# Patient Record
Sex: MALE | RURAL
[De-identification: names, ages, dates, MRNs, and addresses within clinical notes are randomized per-mention and may not be internally consistent; named-entity substitution may affect disease eponyms.]

---

## 2023-10-19 ENCOUNTER — TELEPHONE (OUTPATIENT)
Dept: CARDIOLOGY | Facility: CLINIC | Age: 73
End: 2023-10-19

## 2023-10-19 RX ORDER — METOPROLOL SUCCINATE 25 MG/1
25 TABLET, EXTENDED RELEASE ORAL DAILY
Qty: 30 TABLET | Refills: 0 | OUTPATIENT
Start: 2023-10-19

## 2023-10-19 RX ORDER — ATORVASTATIN CALCIUM 20 MG/1
20 TABLET, FILM COATED ORAL DAILY
Qty: 30 TABLET | Refills: 0 | OUTPATIENT
Start: 2023-10-19

## 2023-10-19 NOTE — TELEPHONE ENCOUNTER
Caller: Hossein Gama    Relationship: Self    Best call back number: [phone]    What is the best time to reach you: ANY    Who are you requesting to speak with (clinical staff, provider,  specific staff member): CLINICAL    What was the call regarding:     PT IS NEEDING HIS MEDICATION REFILLED, BUT DOESN'T KNOW THE NAME. PT IS [facility] AND DOESN'T HAVE HIS MEDICATION WITH HIM. BUT HIS PHARMACY IS    [phone]  [facility]. [address] [facility] IN [place], [place]

## 2023-10-19 NOTE — TELEPHONE ENCOUNTER
CALLED PATIENT NO ANSWER, PATIENT WILL NEED APPT. BEFORE REFILL AS ITS BEEN A YEAR SINCE LAST VISIT.     HUB OK TO READ ENCOUNTER.

## 2024-07-29 ENCOUNTER — APPOINTMENT (OUTPATIENT)
Dept: CARDIOLOGY | Facility: HOSPITAL | Age: 74
End: 2024-07-29

## 2024-07-29 ENCOUNTER — HOSPITAL ENCOUNTER (EMERGENCY)
Facility: HOSPITAL | Age: 74
Discharge: HOME | End: 2024-07-29
Attending: EMERGENCY MEDICINE

## 2024-07-29 ENCOUNTER — APPOINTMENT (OUTPATIENT)
Dept: GENERAL RADIOLOGY | Facility: HOSPITAL | Age: 74
End: 2024-07-29

## 2024-07-29 VITALS
DIASTOLIC BLOOD PRESSURE: 77 MMHG | TEMPERATURE: 97.7 F | SYSTOLIC BLOOD PRESSURE: 105 MMHG | RESPIRATION RATE: 16 BRPM | WEIGHT: 202 LBS | OXYGEN SATURATION: 98 % | HEIGHT: 68 IN | BODY MASS INDEX: 30.62 KG/M2 | HEART RATE: 62 BPM

## 2024-07-29 DIAGNOSIS — I25.10 CORONARY ARTERY DISEASE INVOLVING NATIVE CORONARY ARTERY OF NATIVE HEART, UNSPECIFIED WHETHER ANGINA PRESENT: ICD-10-CM

## 2024-07-29 DIAGNOSIS — I25.84 CORONARY ATHEROSCLEROSIS DUE TO SEVERELY CALCIFIED CORONARY LESION: Primary | ICD-10-CM

## 2024-07-29 DIAGNOSIS — I35.0 AORTIC VALVE STENOSIS, ETIOLOGY OF CARDIAC VALVE DISEASE UNSPECIFIED: ICD-10-CM

## 2024-07-29 DIAGNOSIS — I50.9 CHRONIC CONGESTIVE HEART FAILURE, UNSPECIFIED HEART FAILURE TYPE: ICD-10-CM

## 2024-07-29 DIAGNOSIS — I25.10 3-VESSEL CORONARY ARTERY DISEASE: Primary | ICD-10-CM

## 2024-07-29 DIAGNOSIS — R93.1 ABNORMAL FINDINGS ON CARDIAC CATHETERIZATION: ICD-10-CM

## 2024-07-29 LAB
ALBUMIN SERPL-MCNC: 4.3 G/DL (ref 3.5–5.2)
ALBUMIN/GLOB SERPL: 1.5 G/DL
ALP SERPL-CCNC: 193 U/L (ref 39–117)
ALT SERPL W P-5'-P-CCNC: 14 U/L (ref 1–41)
ANION GAP SERPL CALCULATED.3IONS-SCNC: 11 MMOL/L (ref 5–15)
APTT PPP: 31 SECONDS (ref 60–90)
AST SERPL-CCNC: 21 U/L (ref 1–40)
BASOPHILS # BLD AUTO: 0.02 10*3/MM3 (ref 0–0.2)
BASOPHILS NFR BLD AUTO: 0.3 % (ref 0–1.5)
BILIRUB SERPL-MCNC: 1.3 MG/DL (ref 0–1.2)
BUN SERPL-MCNC: 13 MG/DL (ref 8–23)
BUN/CREAT SERPL: 15.3 (ref 7–25)
CALCIUM SPEC-SCNC: 9.3 MG/DL (ref 8.6–10.5)
CHLORIDE SERPL-SCNC: 102 MMOL/L (ref 98–107)
CO2 SERPL-SCNC: 25 MMOL/L (ref 22–29)
CREAT SERPL-MCNC: 0.85 MG/DL (ref 0.76–1.27)
D DIMER PPP FEU-MCNC: <0.27 MCGFEU/ML (ref 0–0.71)
DEPRECATED RDW RBC AUTO: 46.1 FL (ref 37–54)
EGFRCR SERPLBLD CKD-EPI 2021: 92.9 ML/MIN/1.73
EOSINOPHIL # BLD AUTO: 0.14 10*3/MM3 (ref 0–0.4)
EOSINOPHIL NFR BLD AUTO: 1.9 % (ref 0.3–6.2)
ERYTHROCYTE [DISTWIDTH] IN BLOOD BY AUTOMATED COUNT: 14.2 % (ref 12.3–15.4)
GEN 5 2HR TROPONIN T REFLEX: 27 NG/L
GLOBULIN UR ELPH-MCNC: 2.9 GM/DL
GLUCOSE SERPL-MCNC: 143 MG/DL (ref 65–99)
HCT VFR BLD AUTO: 48.9 % (ref 37.5–51)
HGB BLD-MCNC: 16 G/DL (ref 13–17.7)
HOLD SPECIMEN: NORMAL
IMM GRANULOCYTES # BLD AUTO: 0.02 10*3/MM3 (ref 0–0.05)
IMM GRANULOCYTES NFR BLD AUTO: 0.3 % (ref 0–0.5)
INR PPP: 1.26 (ref 0.89–1.12)
LIPASE SERPL-CCNC: 49 U/L (ref 13–60)
LYMPHOCYTES # BLD AUTO: 1.88 10*3/MM3 (ref 0.7–3.1)
LYMPHOCYTES NFR BLD AUTO: 26.2 % (ref 19.6–45.3)
MCH RBC QN AUTO: 29 PG (ref 26.6–33)
MCHC RBC AUTO-ENTMCNC: 32.7 G/DL (ref 31.5–35.7)
MCV RBC AUTO: 88.6 FL (ref 79–97)
MONOCYTES # BLD AUTO: 0.65 10*3/MM3 (ref 0.1–0.9)
MONOCYTES NFR BLD AUTO: 9.1 % (ref 5–12)
NEUTROPHILS NFR BLD AUTO: 4.47 10*3/MM3 (ref 1.7–7)
NEUTROPHILS NFR BLD AUTO: 62.2 % (ref 42.7–76)
NRBC BLD AUTO-RTO: 0 /100 WBC (ref 0–0.2)
NT-PROBNP SERPL-MCNC: 1738 PG/ML (ref 0–900)
PLATELET # BLD AUTO: 162 10*3/MM3 (ref 140–450)
PMV BLD AUTO: 10.3 FL (ref 6–12)
POTASSIUM SERPL-SCNC: 4.3 MMOL/L (ref 3.5–5.2)
PROT SERPL-MCNC: 7.2 G/DL (ref 6–8.5)
PROTHROMBIN TIME: 16 SECONDS (ref 12.2–14.5)
QT INTERVAL: 404 MS
QT INTERVAL: 428 MS
QTC INTERVAL: 434 MS
QTC INTERVAL: 439 MS
RBC # BLD AUTO: 5.52 10*6/MM3 (ref 4.14–5.8)
SODIUM SERPL-SCNC: 138 MMOL/L (ref 136–145)
TROPONIN T DELTA: 1 NG/L
TROPONIN T SERPL HS-MCNC: 26 NG/L
UFH PPP CHRO-ACNC: 0.1 IU/ML (ref 0.3–0.7)
WBC NRBC COR # BLD AUTO: 7.18 10*3/MM3 (ref 3.4–10.8)
WHOLE BLOOD HOLD COAG: NORMAL
WHOLE BLOOD HOLD SPECIMEN: NORMAL

## 2024-07-29 PROCEDURE — 93017 CV STRESS TEST TRACING ONLY: CPT

## 2024-07-29 PROCEDURE — 85025 COMPLETE CBC W/AUTO DIFF WBC: CPT | Performed by: EMERGENCY MEDICINE

## 2024-07-29 PROCEDURE — 25010000002 DOBUTAMINE PER 250 MG

## 2024-07-29 PROCEDURE — 85520 HEPARIN ASSAY: CPT | Performed by: EMERGENCY MEDICINE

## 2024-07-29 PROCEDURE — 96375 TX/PRO/DX INJ NEW DRUG ADDON: CPT

## 2024-07-29 PROCEDURE — 93325 DOPPLER ECHO COLOR FLOW MAPG: CPT | Performed by: INTERNAL MEDICINE

## 2024-07-29 PROCEDURE — 99283 EMERGENCY DEPT VISIT LOW MDM: CPT | Performed by: INTERNAL MEDICINE

## 2024-07-29 PROCEDURE — 83880 ASSAY OF NATRIURETIC PEPTIDE: CPT | Performed by: EMERGENCY MEDICINE

## 2024-07-29 PROCEDURE — 80053 COMPREHEN METABOLIC PANEL: CPT | Performed by: EMERGENCY MEDICINE

## 2024-07-29 PROCEDURE — 93321 DOPPLER ECHO F-UP/LMTD STD: CPT | Performed by: INTERNAL MEDICINE

## 2024-07-29 PROCEDURE — 93018 CV STRESS TEST I&R ONLY: CPT | Performed by: INTERNAL MEDICINE

## 2024-07-29 PROCEDURE — 25010000002 HEPARIN (PORCINE) PER 1000 UNITS: Performed by: EMERGENCY MEDICINE

## 2024-07-29 PROCEDURE — 84484 ASSAY OF TROPONIN QUANT: CPT | Performed by: EMERGENCY MEDICINE

## 2024-07-29 PROCEDURE — 96376 TX/PRO/DX INJ SAME DRUG ADON: CPT

## 2024-07-29 PROCEDURE — 93350 STRESS TTE ONLY: CPT | Performed by: INTERNAL MEDICINE

## 2024-07-29 PROCEDURE — 93321 DOPPLER ECHO F-UP/LMTD STD: CPT

## 2024-07-29 PROCEDURE — 99284 EMERGENCY DEPT VISIT MOD MDM: CPT

## 2024-07-29 PROCEDURE — 93325 DOPPLER ECHO COLOR FLOW MAPG: CPT

## 2024-07-29 PROCEDURE — 85379 FIBRIN DEGRADATION QUANT: CPT | Performed by: EMERGENCY MEDICINE

## 2024-07-29 PROCEDURE — 85610 PROTHROMBIN TIME: CPT | Performed by: EMERGENCY MEDICINE

## 2024-07-29 PROCEDURE — 93005 ELECTROCARDIOGRAM TRACING: CPT | Performed by: EMERGENCY MEDICINE

## 2024-07-29 PROCEDURE — 25010000002 HEPARIN (PORCINE) 25000-0.45 UT/250ML-% SOLUTION: Performed by: EMERGENCY MEDICINE

## 2024-07-29 PROCEDURE — 93005 ELECTROCARDIOGRAM TRACING: CPT

## 2024-07-29 PROCEDURE — 36415 COLL VENOUS BLD VENIPUNCTURE: CPT

## 2024-07-29 PROCEDURE — 96366 THER/PROPH/DIAG IV INF ADDON: CPT

## 2024-07-29 PROCEDURE — 93350 STRESS TTE ONLY: CPT

## 2024-07-29 PROCEDURE — 71045 X-RAY EXAM CHEST 1 VIEW: CPT

## 2024-07-29 PROCEDURE — 85730 THROMBOPLASTIN TIME PARTIAL: CPT | Performed by: EMERGENCY MEDICINE

## 2024-07-29 PROCEDURE — 83690 ASSAY OF LIPASE: CPT | Performed by: EMERGENCY MEDICINE

## 2024-07-29 PROCEDURE — 96365 THER/PROPH/DIAG IV INF INIT: CPT

## 2024-07-29 RX ORDER — ASPIRIN 325 MG
325 TABLET ORAL DAILY
COMMUNITY

## 2024-07-29 RX ORDER — NITROGLYCERIN 0.4 MG/1
0.4 TABLET SUBLINGUAL
Qty: 100 TABLET | Refills: 0 | Status: SHIPPED | OUTPATIENT
Start: 2024-07-29

## 2024-07-29 RX ORDER — CARVEDILOL 6.25 MG/1
6.25 TABLET ORAL 2 TIMES DAILY WITH MEALS
COMMUNITY

## 2024-07-29 RX ORDER — SPIRONOLACTONE 25 MG/1
25 TABLET ORAL DAILY
COMMUNITY

## 2024-07-29 RX ORDER — HEPARIN SODIUM 1000 [USP'U]/ML
4000 INJECTION, SOLUTION INTRAVENOUS; SUBCUTANEOUS ONCE
Status: COMPLETED | OUTPATIENT
Start: 2024-07-29 | End: 2024-07-29

## 2024-07-29 RX ORDER — LOSARTAN POTASSIUM 25 MG/1
25 TABLET ORAL DAILY
COMMUNITY

## 2024-07-29 RX ORDER — HEPARIN SODIUM 1000 [USP'U]/ML
2000 INJECTION, SOLUTION INTRAVENOUS; SUBCUTANEOUS AS NEEDED
Status: DISCONTINUED | OUTPATIENT
Start: 2024-07-29 | End: 2024-07-29

## 2024-07-29 RX ORDER — SODIUM CHLORIDE 0.9 % (FLUSH) 0.9 %
10 SYRINGE (ML) INJECTION AS NEEDED
Status: DISCONTINUED | OUTPATIENT
Start: 2024-07-29 | End: 2024-07-29 | Stop reason: HOSPADM

## 2024-07-29 RX ORDER — HEPARIN SODIUM 10000 [USP'U]/100ML
10.5 INJECTION, SOLUTION INTRAVENOUS
Status: DISCONTINUED | OUTPATIENT
Start: 2024-07-29 | End: 2024-07-29 | Stop reason: HOSPADM

## 2024-07-29 RX ORDER — ATORVASTATIN CALCIUM 20 MG/1
20 TABLET, FILM COATED ORAL DAILY
COMMUNITY

## 2024-07-29 RX ORDER — HEPARIN SODIUM 1000 [USP'U]/ML
4000 INJECTION, SOLUTION INTRAVENOUS; SUBCUTANEOUS AS NEEDED
Status: DISCONTINUED | OUTPATIENT
Start: 2024-07-29 | End: 2024-07-29

## 2024-07-29 RX ORDER — DOBUTAMINE HYDROCHLORIDE 100 MG/100ML
2-20 INJECTION INTRAVENOUS
Status: DISCONTINUED | OUTPATIENT
Start: 2024-07-29 | End: 2024-07-29

## 2024-07-29 RX ORDER — ASPIRIN 81 MG/1
324 TABLET, CHEWABLE ORAL ONCE
Status: COMPLETED | OUTPATIENT
Start: 2024-07-29 | End: 2024-07-29

## 2024-07-29 RX ADMIN — HEPARIN SODIUM 10.5 UNITS/KG/HR: 10000 INJECTION, SOLUTION INTRAVENOUS at 11:36

## 2024-07-29 RX ADMIN — DOBUTAMINE IN DEXTROSE 5 MCG/KG/MIN: 100 INJECTION, SOLUTION INTRAVENOUS at 14:43

## 2024-07-29 RX ADMIN — ASPIRIN 324 MG: 81 TABLET, CHEWABLE ORAL at 08:46

## 2024-07-29 RX ADMIN — HEPARIN SODIUM 4000 UNITS: 1000 INJECTION INTRAVENOUS; SUBCUTANEOUS at 11:36

## 2024-07-29 NOTE — ED PROVIDER NOTES
Subjective   History of Present Illness  This is a very pleasant 73-year-old male from [facility].  He is accompanied by his wife and sons.  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    He has a history of remote coronary artery disease in 1998 sounds like he had heart cath and stent placement [place] was on aspirin and Coumadin for a while and came off Coumadin.    He really did well up until this past month or so when he is developed exertional chest pain and dyspnea.  He was seen at [facility] apparently had left heart catheterization and an echo and had blockage and sounds like probably by his report an EF of 30%.    He was referred to Dr. Negron at [facility] but apparently there was a problem with his records that called 16 or 18 times to help confirm the follow-up and never got a reply and so his sons had some connection he knew Dr. Tejeda and he called and has an appointment to be seen on the sixth of next month for his issues.    In the interim he has developed worsening exertional dyspnea and some chest pain that is fleeting lasting 15 or 20 seconds does not seem to be associated with exertion but yesterday he was in [employer] and felt woozy and like he might pass out.  He comes the emergency room today hoping to seek an answer for this.  He has a daughter getting  in a few weeks and he is anxious to know if he needs surgery or intervention before that.    He has been compliant with his medications including aspirin.  He had no fevers or chills.  He has had significant peripheral edema that is actually better now after he was started on Lasix temporarily earlier this month.  He felt like he is bloated in his lungs and his abdomen as well but that is improved.    Urination and bowel movements been normal has not passed blood per any orifice.  He has had no fevers chills runny nose sore throat or cough.  Not a smoker or drinker that have any bad habits.  Does like he may have been also  diagnosed with aortic and mitral valve disease again do not have a lot of data on this.        All other systems are reviewed and are negative except as noted above.        Review of Systems   All other systems reviewed and are negative.      No past medical history on file.    No Known Allergies    No past surgical history on file.    No family history on file.    Social History     Socioeconomic History    Marital status:            Objective   Physical Exam  Vitals and nursing note reviewed.   Constitutional:       Comments: This is a very pleasant 73-year-old male alert and oriented GCS 15 he is in no distress.   HENT:      Head: Normocephalic and atraumatic.      Right Ear: External ear normal.      Left Ear: External ear normal.      Nose: Nose normal.      Mouth/Throat:      Mouth: Mucous membranes are moist.      Pharynx: Oropharynx is clear.   Eyes:      Extraocular Movements: Extraocular movements intact.      Conjunctiva/sclera: Conjunctivae normal.      Pupils: Pupils are equal, round, and reactive to light.   Neck:      Vascular: No carotid bruit.   Cardiovascular:      Rate and Rhythm: Normal rate and regular rhythm.      Comments: 3/6 murmur in the aortic area.  He tells me has been told he had a murmur in the past.  Pulmonary:      Effort: Pulmonary effort is normal.      Breath sounds: Normal breath sounds.   Abdominal:      Comments: BMI 30 positive bowel sounds soft and nontender no organomegaly, masses, or guarding.   Musculoskeletal:      Cervical back: Normal range of motion and neck supple. No rigidity or tenderness.      Comments: Full equal pulses in all 4 extremities.  Upper extremities no edema.  Lower extremities he has 2+ edema left leg 1+ right some venous stasis changes no venous cords well-perfused.   Lymphadenopathy:      Cervical: No cervical adenopathy.   Skin:     General: Skin is warm and dry.      Capillary Refill: Capillary refill takes less than 2 seconds.    Neurological:      Mental Status: He is alert.      Comments: Face symmetric, voice strong, tongue midline.  Vision, hearing, and speech preserved.  No focal weakness.         Procedures           ED Course                                 Recent Results (from the past 24 hour(s))   ECG 12 Lead ED Triage Standing Order; Chest Pain    Collection Time: 07/29/24  8:19 AM   Result Value Ref Range    QT Interval 404 ms    QTC Interval 439 ms   High Sensitivity Troponin T    Collection Time: 07/29/24  8:30 AM    Specimen: Blood   Result Value Ref Range    HS Troponin T 26 (H) <22 ng/L   Comprehensive Metabolic Panel    Collection Time: 07/29/24  8:30 AM    Specimen: Blood   Result Value Ref Range    Glucose 143 (H) 65 - 99 mg/dL    BUN 13 8 - 23 mg/dL    Creatinine 0.85 0.76 - 1.27 mg/dL    Sodium 138 136 - 145 mmol/L    Potassium 4.3 3.5 - 5.2 mmol/L    Chloride 102 98 - 107 mmol/L    CO2 25.0 22.0 - 29.0 mmol/L    Calcium 9.3 8.6 - 10.5 mg/dL    Total Protein 7.2 6.0 - 8.5 g/dL    Albumin 4.3 3.5 - 5.2 g/dL    ALT (SGPT) 14 1 - 41 U/L    AST (SGOT) 21 1 - 40 U/L    Alkaline Phosphatase 193 (H) 39 - 117 U/L    Total Bilirubin 1.3 (H) 0.0 - 1.2 mg/dL    Globulin 2.9 gm/dL    A/G Ratio 1.5 g/dL    BUN/Creatinine Ratio 15.3 7.0 - 25.0    Anion Gap 11.0 5.0 - 15.0 mmol/L    eGFR 92.9 >60.0 mL/min/1.73   Lipase    Collection Time: 07/29/24  8:30 AM    Specimen: Blood   Result Value Ref Range    Lipase 49 13 - 60 U/L   BNP    Collection Time: 07/29/24  8:30 AM    Specimen: Blood   Result Value Ref Range    proBNP 1,738.0 (H) 0.0 - 900.0 pg/mL   Green Top (Gel)    Collection Time: 07/29/24  8:30 AM   Result Value Ref Range    Extra Tube Hold for add-ons.    Lavender Top    Collection Time: 07/29/24  8:30 AM   Result Value Ref Range    Extra Tube hold for add-on    Gold Top - SST    Collection Time: 07/29/24  8:30 AM   Result Value Ref Range    Extra Tube Hold for add-ons.    Gray Top    Collection Time: 07/29/24  8:30 AM    Result Value Ref Range    Extra Tube Hold for add-ons.    Light Blue Top    Collection Time: 07/29/24  8:30 AM   Result Value Ref Range    Extra Tube Hold for add-ons.    CBC Auto Differential    Collection Time: 07/29/24  8:30 AM    Specimen: Blood   Result Value Ref Range    WBC 7.18 3.40 - 10.80 10*3/mm3    RBC 5.52 4.14 - 5.80 10*6/mm3    Hemoglobin 16.0 13.0 - 17.7 g/dL    Hematocrit 48.9 37.5 - 51.0 %    MCV 88.6 79.0 - 97.0 fL    MCH 29.0 26.6 - 33.0 pg    MCHC 32.7 31.5 - 35.7 g/dL    RDW 14.2 12.3 - 15.4 %    RDW-SD 46.1 37.0 - 54.0 fl    MPV 10.3 6.0 - 12.0 fL    Platelets 162 140 - 450 10*3/mm3    Neutrophil % 62.2 42.7 - 76.0 %    Lymphocyte % 26.2 19.6 - 45.3 %    Monocyte % 9.1 5.0 - 12.0 %    Eosinophil % 1.9 0.3 - 6.2 %    Basophil % 0.3 0.0 - 1.5 %    Immature Grans % 0.3 0.0 - 0.5 %    Neutrophils, Absolute 4.47 1.70 - 7.00 10*3/mm3    Lymphocytes, Absolute 1.88 0.70 - 3.10 10*3/mm3    Monocytes, Absolute 0.65 0.10 - 0.90 10*3/mm3    Eosinophils, Absolute 0.14 0.00 - 0.40 10*3/mm3    Basophils, Absolute 0.02 0.00 - 0.20 10*3/mm3    Immature Grans, Absolute 0.02 0.00 - 0.05 10*3/mm3    nRBC 0.0 0.0 - 0.2 /100 WBC   D-dimer, Quantitative    Collection Time: 07/29/24  8:30 AM    Specimen: Blood   Result Value Ref Range    D-Dimer, Quantitative <0.27 0.00 - 0.71 MCGFEU/mL   Protime-INR    Collection Time: 07/29/24  8:30 AM    Specimen: Blood   Result Value Ref Range    Protime 16.0 (H) 12.2 - 14.5 Seconds    INR 1.26 (H) 0.89 - 1.12   aPTT    Collection Time: 07/29/24  8:30 AM    Specimen: Blood   Result Value Ref Range    PTT 31.0 (L) 60.0 - 90.0 seconds   ECG 12 Lead ED Triage Standing Order; Chest Pain    Collection Time: 07/29/24 10:45 AM   Result Value Ref Range    QT Interval 428 ms    QTC Interval 434 ms   High Sensitivity Troponin T 2Hr    Collection Time: 07/29/24 10:48 AM    Specimen: Blood   Result Value Ref Range    HS Troponin T 27 (H) <22 ng/L    Troponin T Delta 1 >=-4 - <+4 ng/L    Heparin Anti-Xa    Collection Time: 24 10:48 AM    Specimen: Blood   Result Value Ref Range    Heparin Anti-Xa (UFH) 0.10 (L) 0.30 - 0.70 IU/ml   Adult Stress Echo to Assess Aortic Valve Gradients w/Cont or Stress Agent if Necessary Per Protocol    Collection Time: 24  3:32 PM   Result Value Ref Range    BH CV STRESS PROTOCOL 1 Pharmacologic     Stage 1 1.0     Duration Min Stage 1 2     Duration Sec Stage 1 23     Stress Dose Dobutamine Stage 1 5.00     Stage 2 2.0     Duration Min Stage 2 5     Duration Sec Stage 2 0     Stress Dose Dobutamine Stage 2 10.00     Stage 3 3.0     Duration Sec Stage 3 0     Stress Dose Dobutamine Stage 3 20.00     Baseline HR 71 bpm    Baseline /80 mmHg    O2 sat rest 97 %    Target HR (85%) 127 bpm    Max. Pred. HR (100%) 149 bpm    HR Stage 1 69     BP Stage 1 113/70     O2 Stage 1 98     Stress Rate Stage 1 28     HR Stage 2 68     BP Stage 2 130/74     O2 Stage 2 97     Stress Rate Stage 2 55     HR Stage 3 72     O2 Stage 3 97     Stress Rate Stage 3 110     BP Stage 3 138/72     Duration Min Stage 3 5     Peak HR 78 bpm    Peak /72 mmHg    O2 sat peak 98 %    Recovery HR 74 bpm    Recovery /82 mmHg    Recovery O2 98 %    Percent Max Pred HR 52.35 %    Exercise duration (min) 12 min    Exercise duration (sec) 23 sec    Percent Target HR 62 %    LVIDd 5.0 cm    LVIDs 4.0 cm    IVSd 0.90 cm    LVPWd 0.90 cm    IVS/LVPW 1.00 cm    LVOT diam 2.00 cm    TR max donnie 354.4 cm/sec    LA dimension (2D)  3.6 cm    LV V1 max 56.2 cm/sec    LV V1 max PG 1.28 mmHg    LV V1 mean PG 0.61 mmHg    LV V1 VTI 11.4 cm    Ao pk donnie 201.4 cm/sec    Ao max PG 16.3 mmHg    Ao mean PG 9.5 mmHg    Ao V2 VTI 40.4 cm    MV max PG 4.1 mmHg    MV mean PG 1.70 mmHg    MV V2 VTI 28.8 cm    TR max PG 50.2 mmHg    Ao root diam 3.4 cm    BH CV  AS SEV RESTING HR 67.00     BH CV  AS SEV RESTING SV 37.40     BH CV  AS SEV RESTING LVOT DIAM 2.00     BH CV  AS SEV RESTING  LVOT MUKUND 58.50     BH CV  AS SEV RESTING LVOT VTI 12.30     BH CV  AS SEV RESTING AO MUKUND 193.80     BH CV  AS SEV RESTING AO VTI 40.10     BH CV  AS SEV RESTING AV MEAN 9.70     BH CV  AS SEV RESTING AV PEAK 15.00     BH CV  AS SEV RESTING REYNOLD 0.83     BH CV  AS SEV RESTING AV DI 0.26     BH CV  AS SEV FIRST STAGE MICS 5.00     BH CV  AS SEV FIRST STAGE HR 66.00     BH CV  AS SEV FIRST STAGE SV 33.90     BH CV  AS SEV FIRST STAGE LVOT MUKUND 49.40     BH CV  AS SEV FIRST STAGE LVOT VTI 10.80     BH CV  AS SEV FIRST STAGE AO MUKUND 193.70     BH CV  AS SEV FIRST STAGE AO VTI 41.20     BH CV  AS SEV FIRST STAGE AV MEAN 8.60     BH CV  AS SEV FIRST STAGE AV PEAK 15.00     BH CV  AS SEV FIRST STAGE REYNOLD 0.93     BH CV  AS SEV FIRST STAGE REYNOLD DI 0.30     BH CV  AS SEV SEC STAGE MICS 10.00     BH CV  AS SEV SEC STAGE HR 65.00     BH CV  AS SEV SEC STAGE SV 33.60     BH CV  AS SEV SEC STAGE LVOT MUKUND 58.50     BH CV  AS SEV SEC STAGE LVOT VTI 10.70     BH CV  AS SEV SEC STAGE AO MUKUND 197.70     BH CV  AS SEV SEC STAGE AO VTI 39.80     BH CV  AS SEV SEC STAGE AV MEAN 10.20     BH CV  AS SEV SEC STAGE AV PEAK 15.60     BH CV  AS SEV SEC STAGE REYNOLD 0.96     BH CV  AS SEV SEC STAGE AV DI 0.31     BH CV  AS SEV THRD STAGE MICS 20.00     BH CV  AS SEV THRD STAGE HR 75.00     BH CV  AS SEV THRD STAGE SV 38.90     BH CV  AS SEV THRD STAGE LVOT MUKUND 68.10     BH CV  AS SEV THRD STAGE LVOT VTI 12.40     BH CV  AS SEV THRD STAGE AO MUKUND 245.90     BH CV  AS SEV THRD STAGE AO VTI 47.30     BH CV  AS SEV THRD STAGE AV MEAN 14.30     BH CV  AS SEV THRD STAGE AV PEAK 24.20     BH CV  AS SEV THRD STAGE REYNOLD 0.78     BH CV  AS SEV THRD STAGE AV DI 0.25      Note: In addition to lab results from this visit, the labs listed above may include labs taken at another facility or during a different encounter within the last 24 hours. Please  correlate lab times with ED admission and discharge times for further clarification of the services performed during this visit.    XR Chest 1 View   Final Result   Impression:   No acute cardiopulmonary abnormality.         Electronically Signed: Swati Moreno MD     7/29/2024 9:08 AM EDT     Workstation ID: [id number]        Vitals:    07/29/24 1230 07/29/24 1300 07/29/24 1330 07/29/24 1600   BP: 129/96 112/73 116/75 105/77   Pulse: 75 66 63 62   Resp:       Temp:       SpO2: 98% 96% 97% 98%   Weight:       Height:         Medications   sodium chloride 0.9 % flush 10 mL (has no administration in time range)   heparin 22049 units/250 mL (100 units/mL) in 0.45 % NaCl infusion (0 Units/kg/hr × 91.6 kg Intravenous Stopped 7/29/24 1643)   Pharmacy to Dose Heparin (has no administration in time range)   aspirin chewable tablet 324 mg (324 mg Oral Given 7/29/24 0846)   heparin (porcine) injection 4,000 Units (4,000 Units Intravenous Given 7/29/24 1136)     ECG/EMG Results (last 24 hours)       Procedure Component Value Units Date/Time    ECG 12 Lead ED Triage Standing Order; Chest Pain [[id number]] Collected: 07/29/24 0819     Updated: 07/29/24 0819     QT Interval 404 ms      QTC Interval 439 ms     Narrative:      Test Reason : ED Triage Standing Order~  Blood Pressure :   */*   mmHG  Vent. Rate :  71 BPM     Atrial Rate :  71 BPM     P-R Int : 186 ms          QRS Dur :  94 ms      QT Int : 404 ms       P-R-T Axes :  29  49  70 degrees     QTc Int : 439 ms    Normal sinus rhythm  Possible Inferior infarct , age undetermined  Anterior infarct , age undetermined  Abnormal ECG  No previous ECGs available    Referred By: EDMD           Confirmed By:           ECG 12 Lead ED Triage Standing Order; Chest Pain   Final Result   Test Reason : ED Triage Standing Order~   Blood Pressure :   */*   mmHG   Vent. Rate :  62 BPM     Atrial Rate :  62 BPM      P-R Int : 196 ms          QRS Dur :  96 ms       QT Int : 428 ms       P-R-T  Axes :  25  39  67 degrees      QTc Int : 434 ms      Normal sinus rhythm   Possible Inferior infarct , age undetermined   Anterior infarct , age undetermined   Abnormal ECG   No previous ECGs available   prwp persists as do nsstw changes laterally   nsc c/w earlier ekg   Confirmed by NAZ FAJARDO MD ([id number]) on 7/29/2024 11:03:07 AM      Referred By: ed           Confirmed By: NAZ FAJARDO MD      ECG 12 Lead ED Triage Standing Order; Chest Pain   Preliminary Result   Test Reason : ED Triage Standing Order~   Blood Pressure :   */*   mmHG   Vent. Rate :  71 BPM     Atrial Rate :  71 BPM      P-R Int : 186 ms          QRS Dur :  94 ms       QT Int : 404 ms       P-R-T Axes :  29  49  70 degrees      QTc Int : 439 ms      Normal sinus rhythm   Possible Inferior infarct , age undetermined   Anterior infarct , age undetermined   Abnormal ECG   No previous ECGs available      Referred By: EDMD           Confirmed By:                     Medical Decision Making        I have reviewed all available studies at the bedside with the patient and his family.  D-dimer and cardiac markers are reassuring but does have some component of congestive heart failure though not particularly symptomatic here.    The patient was seen in consultation by cardiology.  Please see their note.  I spoke with Beatris Butler.  Cardiology has reviewed the patient's echo and would like me to discharge her to home they are to contact the patient for follow-up in the office Thursday to arrange coronary artery bypass grafting.  Heart score currently is 6.    We able to get his records from [place] which I also reviewed.    The patient initially presented I started him on IV heparin drip until we could obtain more information as he had already taken his aspirin and his daily medications.    On recheck patient resting comfortably just hungry I provided him a box meal.  Discussed the fact he needs to take it easy not exert himself.  I written him a  prescription for some nitroglycerin to have on a as needed basis but only to take 1 or 2 given his mild aortic stenosis.  Will return to the emergency department for recheck if worse in any way.    All are agreeable with the plan    Problems Addressed:  3-vessel coronary artery disease: complicated acute illness or injury with systemic symptoms that poses a threat to life or bodily functions  Aortic valve stenosis, etiology of cardiac valve disease unspecified: undiagnosed new problem with uncertain prognosis  Chronic congestive heart failure, unspecified heart failure type: chronic illness or injury with exacerbation, progression, or side effects of treatment that poses a threat to life or bodily functions    Amount and/or Complexity of Data Reviewed  Independent Historian: spouse  External Data Reviewed: notes.  Labs: ordered. Decision-making details documented in ED Course.  Radiology: ordered and independent interpretation performed. Decision-making details documented in ED Course.  ECG/medicine tests: ordered and independent interpretation performed. Decision-making details documented in ED Course.    Risk  OTC drugs.  Prescription drug management.  Drug therapy requiring intensive monitoring for toxicity.        Final diagnoses:   3-vessel coronary artery disease   Chronic congestive heart failure, unspecified heart failure type   Aortic valve stenosis, etiology of cardiac valve disease unspecified       ED Disposition  ED Disposition       ED Disposition   Discharge    Condition   Stable    Comment   --               Charlie Turner MD  [address] [place], [place] [id number] [phone]      As needed    Amee Jacobs MD  [facility] [address] [address] [phone]      Their office should call you tomorrow for a follow-up on Thursday.         Medication List        New Prescriptions      nitroglycerin 0.4 MG SL tablet  Commonly known as: NITROSTAT  Place 1 tablet under  the tongue Every 5 (Five) Minutes As Needed for Chest Pain. Take no more than 2 doses in 15 minutes.               Where to Get Your Medications        These medications were sent to [facility] - [place], [place] - [address] [facility] [phone]  - [phone]   [address] [facility] [address]      Phone: [phone]   nitroglycerin 0.4 MG SL tablet            Radames Bauman MD  07/29/24 1651       Radames Bauman MD  07/29/24 6516

## 2024-07-29 NOTE — DISCHARGE INSTRUCTIONS
Take it easy.  Go room to room in the house but do not exert yourself.  Use the nitroglycerin just if you get chest pain that persist for more than a few minutes try 1 and sit down to see if that improves things he might need to take another 1 but I would wait 5 or 10 minutes.  Feel worse, back to the emergency department let us recheck you.

## 2024-07-29 NOTE — CONSULTS
Date of Hospital Visit: 24  Encounter Provider: Beatris Butler PA-C  Place of Service: [facility]  Patient Name: Hossein Gama  :1950  Referral Provider: No ref. provider found  Primary Care Provider: Charlie Turner MD    Chief complaint/Reason for Consultation: chest pain    Problem List:  Coronary artery disease  Remote stent placement approximately , data deficit  Echo 2020, [place]: EF 55-60%, no hemodynamically significant VHD  Stress echo 2020: apical and septal and mid anteroseptal hypokinesis  C at [place] 2024: LAD diffusely diseased with 80% stenosis, mid LAD with 70% tubular stenosis, OM2 totally occluded with bridging collaterals filling mid and distal vessel, small to moderate OM 3 subtotally occluded/99% stenosis, total occlusion of the RCA.  Recommend CT surgery consult for CABG  ED visit with CP and dyspnea on exertion 2024  Ischemic cardiomyopathy  EF 30-35% on cardiac catheterization 2024  Aortic stenosis  [facility] 2024: mild AS with gradient of 15mmHg across aortic valve, suspect some component of low flow low gradient  Hypertension  Dyslipidemia  Diabetes      History of Present Illness:  Patient is a 73-year-old male with the above past medical history who we are asked to see in consultation today for chest pain, dyspnea on exertion, and known coronary artery disease.  Patient underwent left heart catheterization in [place] approximately 2 weeks ago and this revealed severe multivessel CAD as well as ischemic cardiomyopathy and mild aortic stenosis.  He was referred for CABG at an outside hospital but states that he did not hear back from them and was unable to get an appointment.  He did have an upcoming appointment with our office on  but due to an episode of chest pain and dizziness that occurred at [facility] yesterday, he decided to report to the emergency department.  At the time of our visit, he is chest pain-free.  States  "that his edema has improved with diuresis.    No past medical history on file.    No past surgical history on file.    (Not in a hospital admission)      Social History     Socioeconomic History    Marital status:        No family history on file.    REVIEW OF SYSTEMS:     12 point ROS was performed and is Negative except as outlined in HPI     Objective:     Vitals:    07/29/24 0829 07/29/24 1200 07/29/24 1215 07/29/24 1230   BP: 124/75 112/78  129/96   Pulse: 67 65 72 75   Resp:       Temp:       SpO2: 96% 96% 97% 98%   Weight:       Height:         Body mass index is 30.71 kg/m².  Flowsheet Rows      Flowsheet Row First Filed Value   Admission Height 172.7 cm (68\") Documented at 07/29/2024 0813   Admission Weight 91.6 kg (202 lb) Documented at 07/29/2024 0813            Physical Exam   General: well developed and well nourished.    Skin: Skin is warm and dry. No obvious cyanosis, erythema or pallor.   HEENT: Atraumatic, normocephalic, no conjunctival pallor, no scleral icterus.   Neck: Supple, no JVD.   Chest:No respiratory distress. No chest wall tenderness. Breath sounds are normal. No wheezes, rhonchi or rales.  Cardiovascular: regular rate and rhythm, 1/6 systolic ejection murmur  Pulses:Radial and pedal pulses are 2+ and symmetric.    Abdomen: Soft, nontender, normal bowel sounds.   Musculoskeletal/Extremities: 1+ BLE edema  Neurological: Alert and oriented to person, place, and time, no gross focal deficits.   Psychiatric: Normal mood and affect.Speech and behavior is normal.    Lab Review:                Results from last 7 days   Lab Units 07/29/24  0830   SODIUM mmol/L 138   POTASSIUM mmol/L 4.3   CHLORIDE mmol/L 102   CO2 mmol/L 25.0   BUN mg/dL 13   CREATININE mg/dL 0.85   GLUCOSE mg/dL 143*   CALCIUM mg/dL 9.3     Results from last 7 days   Lab Units 07/29/24  1048 07/29/24  0830   HSTROP T ng/L 27* 26*     Results from last 7 days   Lab Units 07/29/24  0830   WBC 10*3/mm3 7.18   HEMOGLOBIN " g/dL 16.0   HEMATOCRIT % 48.9   PLATELETS 10*3/mm3 162     Results from last 7 days   Lab Units 07/29/24  0830   INR  1.26*   APTT seconds 31.0*               Imaging Results (Last 24 Hours)       Procedure Component Value Units Date/Time    XR Chest 1 View [[id number]] Collected: 07/29/24 0908     Updated: 07/29/24 0912    Narrative:      XR CHEST 1 VW    Date of Exam: 7/29/2024 8:26 AM EDT    Indication: Chest Pain Triage Protocol    Comparison: None available.    Findings:  Cardiomediastinal silhouette is prominent.  No airspace disease, pneumothorax, nor pleural effusion. No acute osseous abnormality identified.      Impression:      Impression:  No acute cardiopulmonary abnormality.      Electronically Signed: Swati Moreno MD    7/29/2024 9:08 AM EDT    Workstation ID: [id number]             EKG: NSR with nonspecific ST-T changes, no acute changes          Assessment:   Coronary artery disease, MVCAD on C at [facility] 7/16/2024, cath report reviewed today  Ischemic cardiomyopathy, EF 30-35% on [facility]  Aortic stenosis, mild on C, cannot rule out low flow low gradient  Hypertension  Dyslipidemia   Diabetes    Plan:   Cath report reviewed and have requested cath films via power share. Will need CTS consult for consideration of CABG.  Continue aspirin and statin for CAD. No plavix in anticipation of surgery.   Continue losartan, coreg, and aldactone for GDMT for HF.. May benefit from lasix 20mg daily until time of CABG.   Will obtain dobutamine echo to better depict aortic valve gradient.   Further recommendations to follow echo results and cath film review.     Beatris Butler PA-C      STAFF CARDIOLOGIST:      SUMMARY:    73 years old with recent history of ischemic cardiomyopathy here for worsening shortness of breath      PERTINENT:    Severe coronary artery disease  Mild aortic valve      IMPRESSION:    Coronary artery disease      RECOMMENDATIONS:    Patient is not a candidate for percutaneous  revascularization.  Patient dobutamine echo did not reveal any significant gradient or increase in EF  We will continue treating medically.  We will get surgical evaluation as an outpatient.        I have seen and examined the patient, reviewed the above note, necessary changes were made and I agree with the final note.   Amee Jacobs MD

## 2024-07-29 NOTE — CONSULTS
HEPARIN INFUSION  Hossein Gama is a  73 y.o. male receiving heparin infusion.     Therapy for (VTE/Cardiac):   Cardiac  Patient Weight: 91.6kg  Initial Bolus (Y/N):   Y  Any Bolus (Y/N):   Y        Signs or Symptoms of Bleeding: None per RN    Cardiac or Other (Not VTE)   Initial Bolus: 60 units/kg (Max 4,000 units)  Initial rate: 12 units/kg/hr (Max 1,000 units/hr)   Anti Xa Rebolus Infusion Hold time Change infusion Dose (Units/kg/hr) Next Anti Xa or aPTT Level Due   < 0.11 50 Units/kg  (4000 Units Max) None Increase by  3 Units/kg/hr 6 hours   0.11- 0.19 25 Units/kg  (2000 Units Max) None Increase by  2 Units/kg/hr 6 hours   0.2 - 0.29 0 None Increase by  1 Units/kg/hr 6 hours   0.3 - 0.5 0 None No Change 6 hours (after 2 consecutive levels in range check qAM)   0.51 - 0.6 0 None Decrease by  1 Units/kg/hr 6 hours   0.61 - 0.8 0 30 Minutes Decrease by  2 Units/kg/hr 6 hours   0.81 - 1 0 60 Minutes Decrease by  3 Units/kg/hr 6 hours   >1 0 Hold  After Anti Xa less than 0.5 decrease previous rate by  4 Units/kg/hr  Every 2 hours until Anti Xa  less than 0.5 then when infusion restarts in 6 hours     Recommend Xa every 6 hours.     Results from last 7 days   Lab Units 07/29/24  0830   INR  1.26*   HEMOGLOBIN g/dL 16.0   HEMATOCRIT % 48.9   PLATELETS 10*3/mm3 162          Date   Time   Anti-Xa Current Rate (Unit/kg/hr) Bolus   (Units) Rate Change   (Unit/kg/hr) New Rate (Unit/kg/hr) Next   Anti-Xa Comments  Pump Check Daily   7/29   NEW START 4000 +10.5 10.5 1800 DW RN   7/29 1139 0.1 --- 4000 +10.5 10.5 1800 Dw ED RN. Pump confirmed. Patient counseled                 Ronnie Siu IV, PharmD, BCPS  7/29/2024  11:52 EDT

## 2024-07-30 NOTE — PROGRESS NOTES
Follow-up Visit      Date: 2024  Patient Name: Hossein Gama  : 1950   MRN: [id number]     Chief Complaint:    Chief Complaint   Patient presents with    Coronary Artery Disease       History of Present Illness: Hossein Gama is a 73 y.o. male who is here today for follow-up from the ER for evaluation for coronary artery disease and cardiomyopathy.  Patient has been started on the medication and has been feeling much better.  Patient was diuresed in the ER and has been feeling much better.    Patient episode started few years ago and starting having chest pain and shortness of breath.  Patient also starting having more symptoms and now has been feeling much better.  He was able to do all his activities today.     Patient was evaluated for aortic stenosis by dobutamine echo which was mild.      Problem List     CARDIAC  Coronary Artery Disease:  Remote stents in    Stress echo 2020: apical and septal and mid anteroseptal hypokinesis   [facility] 2024: Severe diffusely disease, not percutaneous candidate    Myocardium:   Echo 2020, [place]: EF 55-60%  EF 2024:30%-35%     Valvular:   Mild AS with gradient of 15mmHg across aortic valve    Electrical:   NSR     Pericardium:   Normal     CARDIAC RISK FACTORS  Hypertension  Diabetes  Dyslipidemia    NON-CARDIAC  None    SURGERIES  None      Subjective      Review of Systems:   Review of Systems   Respiratory: Negative.     Cardiovascular: Negative.        Medications:     Current Outpatient Medications:     aspirin 325 MG tablet, Take 1 tablet by mouth Daily., Disp: , Rfl:     atorvastatin (LIPITOR) 20 MG tablet, Take 1 tablet by mouth Daily., Disp: , Rfl:     carvedilol (COREG) 6.25 MG tablet, Take 1 tablet by mouth 2 (Two) Times a Day With Meals., Disp: , Rfl:     losartan (COZAAR) 25 MG tablet, Take 1 tablet by mouth Daily., Disp: , Rfl:     metFORMIN (GLUCOPHAGE) 500 MG tablet, Take 1 tablet by mouth 2 (Two) Times a Day  "With Meals., Disp: , Rfl:     nitroglycerin (NITROSTAT) 0.4 MG SL tablet, Place 1 tablet under the tongue Every 5 (Five) Minutes As Needed for Chest Pain. Take no more than 2 doses in 15 minutes., Disp: 100 tablet, Rfl: 0    spironolactone (ALDACTONE) 25 MG tablet, Take 1 tablet by mouth Daily., Disp: , Rfl:     furosemide (LASIX) 20 MG tablet, Take 1 tablet by mouth Daily., Disp: 30 tablet, Rfl: 11    potassium chloride 10 MEQ CR tablet, Take 1 tablet by mouth 2 (Two) Times a Day., Disp: 180 tablet, Rfl: 3    Allergies:   No Known Allergies    Objective     Physical Exam:  Vitals:    08/01/24 1023   BP: 118/72   BP Location: Right arm   Patient Position: Sitting   Pulse: 66   SpO2: 98%   Weight: 93.4 kg (206 lb)   Height: 172.7 cm (67.99\")     Body mass index is 31.33 kg/m².    Constitutional:       General: Not in acute distress.     Appearance: Healthy appearance. Not in distress.     Neck:     JVP:Not elevated     Carotid artery: Normal    Pulmonary:      Effort: Pulmonary effort is normal.      Breath sounds: Normal breath sounds. No wheezing. No rhonchi. No rales.     Cardiovascular:      Normal rate. Regular rhythm. Normal S1. Normal S2.      Murmurs: There is mild systolic murmur.      No gallop. No click. No rub.     Abdominal:      General: Bowel sounds are normal.      Palpations: Abdomen is soft.      Tenderness: There is no abdominal tenderness.    Extremities:     Pulses:Normal radial and pedal pulses     Edema:no edema    Smoking Cessation:   Tobacco Product History : Patient never smoked    Lab Review:   Lab Results   Component Value Date    GLUCOSE 143 (H) 07/29/2024    BUN 13 07/29/2024    CREATININE 0.85 07/29/2024    BCR 15.3 07/29/2024    K 4.3 07/29/2024    CO2 25.0 07/29/2024    CALCIUM 9.3 07/29/2024    ALBUMIN 4.3 07/29/2024    AST 21 07/29/2024    ALT 14 07/29/2024     Lab Results   Component Value Date    WBC 7.18 07/29/2024    HGB 16.0 07/29/2024    HCT 48.9 07/29/2024    MCV 88.6 " 07/29/2024     07/29/2024         Assessment / Plan      Assessment:   Diagnosis Plan   1. Coronary atherosclerosis due to severely calcified coronary lesion        2. Nonrheumatic aortic valve stenosis        3. Systolic congestive heart failure, unspecified HF chronicity             Plan:  Patient has severe coronary artery disease.  Patient is not a candidate for percutaneous intervention.  Patient has consult with cardiothoracic surgeon on August 6.  Patient aortic valve has mild stenosis.  The mean gradient is between 10-15.  Patient has been put on proper medication for heart failure.  I have given her prescription for Lasix and potassium and is, call us back if his symptoms deteriorates.      Follow Up:       Return in about 3 months (around 11/1/2024).    Amee Jacobs MD

## 2024-08-01 ENCOUNTER — OFFICE VISIT (OUTPATIENT)
Dept: CARDIOLOGY | Facility: CLINIC | Age: 74
End: 2024-08-01

## 2024-08-01 ENCOUNTER — TELEPHONE (OUTPATIENT)
Dept: CARDIAC SURGERY | Facility: CLINIC | Age: 74
End: 2024-08-01

## 2024-08-01 VITALS
WEIGHT: 206 LBS | BODY MASS INDEX: 31.22 KG/M2 | SYSTOLIC BLOOD PRESSURE: 118 MMHG | HEART RATE: 66 BPM | HEIGHT: 68 IN | DIASTOLIC BLOOD PRESSURE: 72 MMHG | OXYGEN SATURATION: 98 %

## 2024-08-01 DIAGNOSIS — I50.20 SYSTOLIC CONGESTIVE HEART FAILURE, UNSPECIFIED HF CHRONICITY: ICD-10-CM

## 2024-08-01 DIAGNOSIS — I35.0 NONRHEUMATIC AORTIC VALVE STENOSIS: ICD-10-CM

## 2024-08-01 DIAGNOSIS — I25.84 CORONARY ATHEROSCLEROSIS DUE TO SEVERELY CALCIFIED CORONARY LESION: Primary | ICD-10-CM

## 2024-08-01 LAB
BH CV DOB AS SEV FIRST STAGE AO VEL: 193.7
BH CV DOB AS SEV FIRST STAGE AO VTI: 41.2
BH CV DOB AS SEV FIRST STAGE AV MEAN: 8.6
BH CV DOB AS SEV FIRST STAGE AV PEAK: 15
BH CV DOB AS SEV FIRST STAGE AVA DI: 0.3
BH CV DOB AS SEV FIRST STAGE AVA: 0.93
BH CV DOB AS SEV FIRST STAGE HR: 66
BH CV DOB AS SEV FIRST STAGE LVOT VEL: 49.4
BH CV DOB AS SEV FIRST STAGE LVOT VTI: 10.8
BH CV DOB AS SEV FIRST STAGE MICS: 5
BH CV DOB AS SEV FIRST STAGE SV: 33.9
BH CV DOB AS SEV RESTING AO VEL: 193.8
BH CV DOB AS SEV RESTING AO VTI: 40.1
BH CV DOB AS SEV RESTING AV DI: 0.26
BH CV DOB AS SEV RESTING AV MEAN: 9.7
BH CV DOB AS SEV RESTING AV PEAK: 15
BH CV DOB AS SEV RESTING AVA: 0.83
BH CV DOB AS SEV RESTING HR: 67
BH CV DOB AS SEV RESTING LVOT DIAM: 2
BH CV DOB AS SEV RESTING LVOT VEL: 58.5
BH CV DOB AS SEV RESTING LVOT VTI: 12.3
BH CV DOB AS SEV RESTING SV: 37.4
BH CV DOB AS SEV SEC STAGE AO VEL: 197.7
BH CV DOB AS SEV SEC STAGE AO VTI: 39.8
BH CV DOB AS SEV SEC STAGE AV DI: 0.31
BH CV DOB AS SEV SEC STAGE AV MEAN: 10.2
BH CV DOB AS SEV SEC STAGE AV PEAK: 15.6
BH CV DOB AS SEV SEC STAGE AVA: 0.96
BH CV DOB AS SEV SEC STAGE HR: 65
BH CV DOB AS SEV SEC STAGE LVOT VEL: 58.5
BH CV DOB AS SEV SEC STAGE LVOT VTI: 10.7
BH CV DOB AS SEV SEC STAGE MICS: 10
BH CV DOB AS SEV SEC STAGE SV: 33.6
BH CV DOB AS SEV THRD STAGE AO VEL: 245.9
BH CV DOB AS SEV THRD STAGE AO VTI: 47.3
BH CV DOB AS SEV THRD STAGE AV DI: 0.25
BH CV DOB AS SEV THRD STAGE AV MEAN: 14.3
BH CV DOB AS SEV THRD STAGE AV PEAK: 24.2
BH CV DOB AS SEV THRD STAGE AVA: 0.78
BH CV DOB AS SEV THRD STAGE HR: 75
BH CV DOB AS SEV THRD STAGE LVOT VEL: 68.1
BH CV DOB AS SEV THRD STAGE LVOT VTI: 12.4
BH CV DOB AS SEV THRD STAGE MICS: 20
BH CV DOB AS SEV THRD STAGE SV: 38.9
BH CV ECHO MEAS - AO MAX PG: 16.3 MMHG
BH CV ECHO MEAS - AO MEAN PG: 9.5 MMHG
BH CV ECHO MEAS - AO ROOT DIAM: 3.4 CM
BH CV ECHO MEAS - AO V2 MAX: 201.4 CM/SEC
BH CV ECHO MEAS - AO V2 VTI: 40.4 CM
BH CV ECHO MEAS - IVS/LVPW: 1 CM
BH CV ECHO MEAS - IVSD: 0.9 CM
BH CV ECHO MEAS - LA DIMENSION: 3.6 CM
BH CV ECHO MEAS - LV MAX PG: 1.28 MMHG
BH CV ECHO MEAS - LV MEAN PG: 0.61 MMHG
BH CV ECHO MEAS - LV V1 MAX: 56.2 CM/SEC
BH CV ECHO MEAS - LV V1 VTI: 11.4 CM
BH CV ECHO MEAS - LVIDD: 5 CM
BH CV ECHO MEAS - LVIDS: 4 CM
BH CV ECHO MEAS - LVOT DIAM: 2 CM
BH CV ECHO MEAS - LVPWD: 0.9 CM
BH CV ECHO MEAS - MV MAX PG: 4.1 MMHG
BH CV ECHO MEAS - MV MEAN PG: 1.7 MMHG
BH CV ECHO MEAS - MV V2 VTI: 28.8 CM
BH CV ECHO MEAS - TR MAX PG: 50.2 MMHG
BH CV ECHO MEAS - TR MAX VEL: 354.4 CM/SEC
BH CV STRESS BP STAGE 1: NORMAL
BH CV STRESS BP STAGE 2: NORMAL
BH CV STRESS BP STAGE 3: NORMAL
BH CV STRESS DOSE DOBUTAMINE STAGE 1: 5
BH CV STRESS DOSE DOBUTAMINE STAGE 2: 10
BH CV STRESS DOSE DOBUTAMINE STAGE 3: 20
BH CV STRESS DURATION MIN STAGE 1: 2
BH CV STRESS DURATION MIN STAGE 2: 5
BH CV STRESS DURATION MIN STAGE 3: 5
BH CV STRESS DURATION SEC STAGE 1: 23
BH CV STRESS DURATION SEC STAGE 2: 0
BH CV STRESS DURATION SEC STAGE 3: 0
BH CV STRESS ECHO POST STRESS EJECTION FRACTION EF: 40 %
BH CV STRESS HR STAGE 1: 69
BH CV STRESS HR STAGE 2: 68
BH CV STRESS HR STAGE 3: 72
BH CV STRESS O2 STAGE 1: 98
BH CV STRESS O2 STAGE 2: 97
BH CV STRESS O2 STAGE 3: 97
BH CV STRESS PROTOCOL 1: NORMAL
BH CV STRESS RATE STAGE 1: 28
BH CV STRESS RATE STAGE 2: 55
BH CV STRESS RATE STAGE 3: 110
BH CV STRESS RECOVERY BP: NORMAL MMHG
BH CV STRESS RECOVERY HR: 74 BPM
BH CV STRESS RECOVERY O2: 98 %
BH CV STRESS STAGE 1: 1
BH CV STRESS STAGE 2: 2
BH CV STRESS STAGE 3: 3
MAXIMAL PREDICTED HEART RATE: 149 BPM
PERCENT MAX PREDICTED HR: 52.35 %
STRESS BASELINE BP: NORMAL MMHG
STRESS BASELINE HR: 71 BPM
STRESS O2 SAT REST: 97 %
STRESS PERCENT HR: 62 %
STRESS POST EXERCISE DUR MIN: 12 MIN
STRESS POST EXERCISE DUR SEC: 23 SEC
STRESS POST O2 SAT PEAK: 98 %
STRESS POST PEAK BP: NORMAL MMHG
STRESS POST PEAK HR: 78 BPM
STRESS TARGET HR: 127 BPM

## 2024-08-01 RX ORDER — POTASSIUM CHLORIDE 750 MG/1
10 TABLET, FILM COATED, EXTENDED RELEASE ORAL 2 TIMES DAILY
Qty: 180 TABLET | Refills: 3 | Status: SHIPPED | OUTPATIENT
Start: 2024-08-01

## 2024-08-01 RX ORDER — FUROSEMIDE 20 MG/1
20 TABLET ORAL DAILY
Qty: 30 TABLET | Refills: 11 | Status: SHIPPED | OUTPATIENT
Start: 2024-08-01

## 2024-08-01 NOTE — TELEPHONE ENCOUNTER
m for pt to call back. Surgery will not be scheduled on day on new pt office visit. Pt MAY require further testing, etc.. prior to scheduling surgery. Pt will have ample time to set up transportation prior to his surgery.

## 2024-08-01 NOTE — TELEPHONE ENCOUNTER
Caller: Hossein Gama    Relationship: Self    Best call back number: [phone]  OK TO DETAILED VM     What is the best time to reach you: ANY    Who are you requesting to speak with (clinical staff, provider,  specific staff member): ANY    Do you know the name of the person who called: N/A    What was the call regarding: PT CALLED AND ASKED IF HE WOULD BE GIVEN HIS SURGERY DATE AT HIS NEW PT APPT ON 8/6/24. I LET PT KNOW I WASN'T SURE IF THEY COULD GIVE EXACT DATE AT THAT TIME. HE REQUESTED I PUT IN A NOTE REQUESTING THE DATE OF HIS OPEN HEART SURGERY BE READY AT HIS APPT ON 8/6/24 OR SOONER IF POSSIBLE. HE IS REQUESTING THIS SO HE CAN ARRANGE FOR TRANSPORT ASAP - THEY HAVE TO HIRE TRANSPORT.     Is it okay if the provider responds through MyChart: NO PLEASE CALL

## 2024-08-05 PROBLEM — I25.10 CAD (CORONARY ARTERY DISEASE): Status: ACTIVE | Noted: 2024-07-29

## 2024-08-05 NOTE — PROGRESS NOTES
08/06/2024  Patient Information  Hossein Gama                                                                                          [address]  [facility] [address]   1950  'PCP/Referring Physician'  Charlie Turner MD  [phone]  Amee Jacobs MD  [phone]  Chief Complaint   Patient presents with    Coronary Artery Disease     Referred by Dr. Guillen for CAD. Patient had chest pain last night that radiated into left shoulder and down left arm.        History of Present Illness:  Hossein Gama is a 73 y.o. gentleman referred to Dr. Michaud per Dr. Jacobs for CAD.  PMH: Hypertension, type 2 diabetes, dyslipidemia, mild aortic stenosis, and multivessel CAD with ejection fraction 30 to 35%.  Cardiac history includes remote PCI 1998 followed most recently by left heart cath at [facility] July 2024: LAD diffusely diseased with 80% stenosis, mid LAD 70% tubular stenosis, OM2 occluded with bridging collaterals filling the mid and distal vessel, small to moderate OM 3 subtotally occluded with 99% stenosis and total occlusion of the RCA.  Patient had originally been referred for CABG at outside hospital but presented to [facility] ER 7/29/2024 at which time Dr. Jacobs performed cardiology consultation.  Outpatient consultation with Dr. Michaud was arranged.  Since the ER visit, patient has continued to experience intermittent chest pain.  Medical management includes aspirin, statin, losartan, Coreg, and Aldactone.  Dr. Jacobs arrange dobutamine stress echo 7/29/2024 which did confirm mild aortic stenosis with aortic mean gradient 14 mmHg at 20 mics per minute dobutamine.  History of chest wall radiation.  No history of lower extremity PVD procedures or vein stripping.  He did suffer trauma/open fracture left lower extremity years ago.      Patient Active Problem List   Diagnosis    CAD (coronary artery disease)     Past Medical History:   Diagnosis Date    CAD (coronary artery disease) 07/29/2024    Chest  pain     CHF (congestive heart failure)     Deep vein thrombosis     LEFT LEG    Diabetes     GERD (gastroesophageal reflux disease)     Heart failure 2024    Hyperlipidemia     Hypertension     Myocardial infarction     Pneumonia      Past Surgical History:   Procedure Laterality Date    CHOLECYSTECTOMY      CORONARY STENT PLACEMENT         Current Outpatient Medications:     aspirin 325 MG tablet, Take 1 tablet by mouth Daily., Disp: , Rfl:     atorvastatin (LIPITOR) 20 MG tablet, Take 1 tablet by mouth Daily., Disp: , Rfl:     carvedilol (COREG) 6.25 MG tablet, Take 1 tablet by mouth 2 (Two) Times a Day With Meals., Disp: , Rfl:     furosemide (LASIX) 20 MG tablet, Take 1 tablet by mouth Daily., Disp: 30 tablet, Rfl: 11    losartan (COZAAR) 25 MG tablet, Take 1 tablet by mouth Daily., Disp: , Rfl:     metFORMIN (GLUCOPHAGE) 500 MG tablet, Take 1 tablet by mouth 2 (Two) Times a Day With Meals., Disp: , Rfl:     nitroglycerin (NITROSTAT) 0.4 MG SL tablet, Place 1 tablet under the tongue Every 5 (Five) Minutes As Needed for Chest Pain. Take no more than 2 doses in 15 minutes., Disp: 100 tablet, Rfl: 0    potassium chloride 10 MEQ CR tablet, Take 1 tablet by mouth 2 (Two) Times a Day., Disp: 180 tablet, Rfl: 3    spironolactone (ALDACTONE) 25 MG tablet, Take 1 tablet by mouth Daily., Disp: , Rfl:   No Known Allergies  Social History     Socioeconomic History    Marital status:     Number of children: 13   Tobacco Use    Smoking status: Former     Types: Pipe     Quit date:      Years since quittin.6     Passive exposure: Never    Smokeless tobacco: Never   Vaping Use    Vaping status: Never Used   Substance and Sexual Activity    Alcohol use: Never    Drug use: Never    Sexual activity: Defer     Family History   Problem Relation Age of Onset    No Known Problems Mother     Cancer Father      Review of Systems   Constitutional: Positive for malaise/fatigue. Negative for chills, decreased  "appetite, diaphoresis, fever, night sweats, weight gain and weight loss.   HENT:  Negative for hoarse voice.    Eyes:  Negative for blurred vision, double vision and visual disturbance.   Cardiovascular:  Positive for chest pain, dyspnea on exertion and leg swelling. Negative for claudication, irregular heartbeat, near-syncope, orthopnea, palpitations, paroxysmal nocturnal dyspnea and syncope.   Respiratory:  Positive for shortness of breath. Negative for cough, hemoptysis, sputum production and wheezing.    Hematologic/Lymphatic: Negative for adenopathy and bleeding problem. Does not bruise/bleed easily.   Skin:  Negative for color change, nail changes, poor wound healing and rash.   Musculoskeletal:  Negative for back pain, falls and muscle cramps.   Gastrointestinal:  Negative for abdominal pain, dysphagia and heartburn.   Genitourinary:  Negative for flank pain.   Neurological:  Positive for dizziness, light-headedness and numbness (feet). Negative for brief paralysis, disturbances in coordination, focal weakness, headaches, loss of balance, paresthesias, sensory change, vertigo and weakness.   Psychiatric/Behavioral:  Negative for depression and suicidal ideas. The patient is not nervous/anxious.    Allergic/Immunologic: Negative for persistent infections.     Vitals:    08/06/24 1126 08/06/24 1127   BP: 118/58 100/58   BP Location: Right arm Left arm   Patient Position: Sitting Sitting   Pulse: 67    Temp: 98.8 °F (37.1 °C)    SpO2: 98%    Weight: 92.9 kg (204 lb 12.8 oz)    Height: 172.7 cm (68\")       Physical Exam  Vitals reviewed.   Constitutional:       General: He is not in acute distress.     Appearance: He is not toxic-appearing.   HENT:      Head: Normocephalic and atraumatic.   Eyes:      General: Lids are normal.      Conjunctiva/sclera: Conjunctivae normal.      Pupils: Pupils are equal, round, and reactive to light.   Neck:      Vascular: No carotid bruit or JVD.   Cardiovascular:      Rate and " Rhythm: Normal rate and regular rhythm.      Pulses:           Dorsalis pedis pulses are 1+ on the right side and 1+ on the left side.        Posterior tibial pulses are 1+ on the right side and 1+ on the left side.      Heart sounds: S1 normal and S2 normal. Murmur heard.      Systolic murmur is present with a grade of 2/6.   Pulmonary:      Effort: Pulmonary effort is normal. No respiratory distress.      Breath sounds: Normal breath sounds.   Musculoskeletal:         General: Normal range of motion.      Cervical back: Normal range of motion and neck supple.      Right lower leg: No edema.      Left lower leg: No edema.   Lymphadenopathy:      Cervical: No cervical adenopathy.   Skin:     General: Skin is warm and dry.      Capillary Refill: Capillary refill takes less than 2 seconds.   Neurological:      General: No focal deficit present.      Mental Status: He is alert and oriented to person, place, and time.      GCS: GCS eye subscore is 4. GCS verbal subscore is 5. GCS motor subscore is 6.   Psychiatric:         Attention and Perception: Attention normal.         Mood and Affect: Mood normal.         Speech: Speech normal.         Behavior: Behavior is cooperative.         Thought Content: Thought content normal.         Judgment: Judgment normal.     The ROS, past medical history, surgical history, family history, social history, and vitals were reviewed by myself and corrected as needed.      Labs/Imaging:  Dobutamine Stress Echo 8/1/2024 Interpretation Summary     Patient did not endorse chest pain or any other symptoms during stress test.    SR with rare PVCs and PJCs throughout study    T wave inversion in inferolateral leads at baseline and throughout study. No significant ST changes noted.    Unable to assess for myocardial ischemia.    Patient gradient across the mitral valve shows mild aortic stenosis with dobutamine stress echo.    Left ventricular ejection fraction appears to be 36 - 40%.    Left  ventricular diastolic function is consistent with (grade I) impaired relaxation.    Mild aortic valve stenosis is present.    Cardiac Cath 7/16/2024 @ [facility]/Dr. Fields (Personally reviewed)  Coronary Angiography:  Left main coronary artery  Free of disease  Left anterior descending coronary artery:  Proximal segment is calcific.  Proximal LAD diffusely diseased with 80% stenosis.  Small to moderate D1 arising from the proximal LAD is free of disease.  Mid LAD has 70% tubular stenosis.  Rest of LAD and moderate D2 are free of disease.  Circumflex coronary artery:  Nondominant.  Small OM1 arising from the proximal segment is free of disease.  Moderate OM 2 totally occluded with bridging collaterals filling the mid and distal vessel, small to moderate OM 3 subtotally occluded, 99% stenosis.  OM 2 and OM 3 arise from mid circumflex.  Circumflex bifurcates distally into moderate OM 4 and smaller true circumflex.  Collaterals noted to PL and PDA on injection of left coronary system  Right coronary artery: Total occlusion mid segment  Left ventriculography:  Very limited study.  Catheter just past LVOT, inadequate evaluation of LV function, LV catheterization done mostly for gradient across aortic valve  Impression:  Severe three-vessel CAD  Moderate LV dysfunction (echo EF 30 to 35%)  Peak gradient across aortic valve 15 mmHg, low gradient low flow AS not excluded  Biventricular CHF  Recommendations:  Evaluate for CABG.  Will need dobutamine echo to evaluate for low-flow low gradient aortic stenosis, will defer to CT surgery  Tray Fields MD 7/16/2024                          Radial Artery Checklist:        Vicente Test:         Upper Extremity Arterial Duplex:  No          Hand Dominance:  Right       History Of Major Arm Trauma:  No       History Of Upper Extremity Surgery:  No       Raynaud's Syndrome:  No       Scleroderma:  No       Advanced Stage Chronic Kidney Disease:  No        Angiography Via Radial Artery Access:  No       Carpel Tunnel:  No       Lymphedema:  No       Occupation:  farming/construction       ------------------------------------------------------------------------------------------             Assessment/Plan:    1. Coronary artery disease involving native coronary artery of native heart with angina pectoris   - Hossein Gama is a 73 y.o. gentleman referred to Dr. Michaud per Dr. Jacobs for CAD.    - PMH: Hypertension, type 2 diabetes, dyslipidemia, mild aortic stenosis,  multivessel CAD with remote PCI 1998, and ischemic cardiomyopathy w/ ejection fraction 30 - 35%.    - Left heart cath at [place] July 2024: LAD diffusely diseased with 80% stenosis, mid LAD 70% tubular stenosis, OM2 occluded with bridging collaterals filling the mid and distal vessel, small to moderate OM 3 subtotally occluded with 99% stenosis and total occlusion of the RCA.    - Originally been referred for CABG at outside hospital but [facility] 7/29/2024 at which time Dr. Jacobs performed cardiology consultation.   - Since the ER visit, patient has continued to experience intermittent chest pain.    - Medical management includes aspirin, statin, losartan, Coreg, and Aldactone.    - Dobutamine stress echo 7/29/2024: mild aortic stenosis with aortic mean gradient 14 mmHg at 20 mics per minute dobutamine.   - No history of chest wall radiation.  No history of lower extremity PVD procedures or vein stripping.  He did suffer trauma/open fracture left lower extremity years ago.  - Bilateral Vicente testing is positive  - Discussed coronary artery bypass grafting with EV/RAH in detail including death, MI, CVA, renal dysfunction, blood clots, bleeding, respiratory compromise, and infection.  Discussed post op expectations as well.  Mr. Gama verbalized understanding of the procedure and risks and he wishes to proceed.  - Carotid duplex pending  - Follow up in clinic 6 weeks post op  CABG/EVH/YOLA/RAH per Dr. Michaud.         Patient Education: see surgical risk discussion above      Follow Up:   Return in about 8 weeks (around 10/1/2024) for post op CABG/EVH/YOLA/RAH.   Or sooner for any further concerns or worsening sign and symptoms. If unable to reach us in the office please dial 911 or go to the nearest emergency department.      KAILEY Winchester  [facility] Cardiothoracic Surgery    Time Spent: I spent 45 minutes caring for Hossein on this date of service. This time includes time spent by me in the following activities: preparing for the visit, reviewing tests, obtaining and/or reviewing a separately obtained history, performing a medically appropriate examination and/or evaluation, counseling and educating the patient/family/caregiver, ordering medications, tests, or procedures, referring and communicating with other health care professionals, documenting information in the medical record, independently interpreting results and communicating that information with the patient/family/caregiver, and care coordination.

## 2024-08-06 ENCOUNTER — OFFICE VISIT (OUTPATIENT)
Dept: CARDIAC SURGERY | Facility: CLINIC | Age: 74
End: 2024-08-06

## 2024-08-06 VITALS
BODY MASS INDEX: 31.04 KG/M2 | SYSTOLIC BLOOD PRESSURE: 100 MMHG | HEART RATE: 67 BPM | TEMPERATURE: 98.8 F | OXYGEN SATURATION: 98 % | WEIGHT: 204.8 LBS | DIASTOLIC BLOOD PRESSURE: 58 MMHG | HEIGHT: 68 IN

## 2024-08-06 DIAGNOSIS — I25.119 CORONARY ARTERY DISEASE INVOLVING NATIVE CORONARY ARTERY OF NATIVE HEART WITH ANGINA PECTORIS: Primary | ICD-10-CM

## 2024-08-06 PROCEDURE — 99204 OFFICE O/P NEW MOD 45 MIN: CPT | Performed by: NURSE PRACTITIONER

## 2024-08-08 ENCOUNTER — PREP FOR SURGERY (OUTPATIENT)
Dept: OTHER | Facility: HOSPITAL | Age: 74
End: 2024-08-08

## 2024-08-08 DIAGNOSIS — I25.119 CORONARY ARTERY DISEASE INVOLVING NATIVE CORONARY ARTERY OF NATIVE HEART WITH ANGINA PECTORIS: Primary | ICD-10-CM

## 2024-08-09 RX ORDER — ASPIRIN 325 MG
325 TABLET ORAL NIGHTLY
OUTPATIENT
Start: 2024-08-09 | End: 2024-08-10

## 2024-08-09 RX ORDER — CHLORHEXIDINE GLUCONATE ORAL RINSE 1.2 MG/ML
15 SOLUTION DENTAL ONCE
OUTPATIENT
Start: 2024-08-09 | End: 2024-08-09

## 2024-08-09 RX ORDER — NITROGLYCERIN 0.4 MG/1
0.4 TABLET SUBLINGUAL
OUTPATIENT
Start: 2024-08-09

## 2024-08-09 RX ORDER — GABAPENTIN 300 MG/1
300 CAPSULE ORAL ONCE
OUTPATIENT
Start: 2024-08-09 | End: 2024-08-09

## 2024-08-09 RX ORDER — CHLORHEXIDINE GLUCONATE 500 MG/1
CLOTH TOPICAL EVERY 12 HOURS PRN
OUTPATIENT
Start: 2024-08-09

## 2024-08-09 RX ORDER — CHLORHEXIDINE GLUCONATE 500 MG/1
1 CLOTH TOPICAL EVERY 12 HOURS PRN
OUTPATIENT
Start: 2024-08-09

## 2024-08-12 ENCOUNTER — TELEPHONE (OUTPATIENT)
Dept: CARDIAC SURGERY | Facility: CLINIC | Age: 74
End: 2024-08-12

## 2024-08-12 NOTE — TELEPHONE ENCOUNTER
Patient spouse called to ask if it was okay for patient to take ibuprofen for headache. She states they were concerned for him to take it since he takes an ASA 325mg dose daily. Discussed with KAILEY Winchester. She advised that patient can take ibuprofen on PRN basis with food for headache. Relayed info to patient's spouse. Advised that they call with any further questions or concerns. She verbalized understanding and agreed.

## 2024-08-23 ENCOUNTER — PRE-ADMISSION TESTING (OUTPATIENT)
Dept: PREADMISSION TESTING | Facility: HOSPITAL | Age: 74
End: 2024-08-23

## 2024-08-23 ENCOUNTER — HOSPITAL ENCOUNTER (OUTPATIENT)
Dept: CARDIOLOGY | Facility: HOSPITAL | Age: 74
Discharge: HOME | End: 2024-08-23

## 2024-08-23 ENCOUNTER — HOSPITAL ENCOUNTER (OUTPATIENT)
Dept: PULMONOLOGY | Facility: HOSPITAL | Age: 74
Discharge: HOME | End: 2024-08-23

## 2024-08-23 ENCOUNTER — HOSPITAL ENCOUNTER (OUTPATIENT)
Dept: GENERAL RADIOLOGY | Facility: HOSPITAL | Age: 74
Discharge: HOME | End: 2024-08-23

## 2024-08-23 VITALS — BODY MASS INDEX: 31.27 KG/M2 | OXYGEN SATURATION: 98 % | WEIGHT: 206.35 LBS | HEIGHT: 68 IN

## 2024-08-23 VITALS — HEIGHT: 68 IN | WEIGHT: 204.81 LBS | BODY MASS INDEX: 31.04 KG/M2

## 2024-08-23 DIAGNOSIS — I25.119 CORONARY ARTERY DISEASE INVOLVING NATIVE CORONARY ARTERY OF NATIVE HEART WITH ANGINA PECTORIS: ICD-10-CM

## 2024-08-23 DIAGNOSIS — Z01.89 LABORATORY TEST: Primary | ICD-10-CM

## 2024-08-23 LAB
ABO GROUP BLD: NORMAL
ALBUMIN SERPL-MCNC: 4.2 G/DL (ref 3.5–5.2)
ALBUMIN/GLOB SERPL: 1.4 G/DL
ALP SERPL-CCNC: 141 U/L (ref 39–117)
ALT SERPL W P-5'-P-CCNC: 14 U/L (ref 1–41)
AMPHET+METHAMPHET UR QL: NEGATIVE
AMPHETAMINES UR QL: NEGATIVE
ANION GAP SERPL CALCULATED.3IONS-SCNC: 11 MMOL/L (ref 5–15)
APTT PPP: 30.3 SECONDS (ref 22–39)
AST SERPL-CCNC: 20 U/L (ref 1–40)
BARBITURATES UR QL SCN: NEGATIVE
BASOPHILS # BLD AUTO: 0.01 10*3/MM3 (ref 0–0.2)
BASOPHILS NFR BLD AUTO: 0.1 % (ref 0–1.5)
BENZODIAZ UR QL SCN: NEGATIVE
BH CV XLRA MEAS LEFT DIST CCA EDV: 14.2 CM/SEC
BH CV XLRA MEAS LEFT DIST CCA PSV: 62.6 CM/SEC
BH CV XLRA MEAS LEFT DIST ICA EDV: 23.4 CM/SEC
BH CV XLRA MEAS LEFT DIST ICA PSV: 74.1 CM/SEC
BH CV XLRA MEAS LEFT ICA/CCA RATIO: 0.98
BH CV XLRA MEAS LEFT MID CCA EDV: 14.2 CM/SEC
BH CV XLRA MEAS LEFT MID CCA PSV: 75.7 CM/SEC
BH CV XLRA MEAS LEFT MID ICA EDV: 17.1 CM/SEC
BH CV XLRA MEAS LEFT MID ICA PSV: 59.6 CM/SEC
BH CV XLRA MEAS LEFT PROX CCA EDV: 10.5 CM/SEC
BH CV XLRA MEAS LEFT PROX CCA PSV: 70.1 CM/SEC
BH CV XLRA MEAS LEFT PROX ECA EDV: 6.7 CM/SEC
BH CV XLRA MEAS LEFT PROX ECA PSV: 57.8 CM/SEC
BH CV XLRA MEAS LEFT PROX ICA EDV: 16.8 CM/SEC
BH CV XLRA MEAS LEFT PROX ICA PSV: 70.5 CM/SEC
BH CV XLRA MEAS LEFT PROX SCLA PSV: 107.15 CM/SEC
BH CV XLRA MEAS LEFT VERTEBRAL A PSV: 23.1 CM/SEC
BH CV XLRA MEAS RIGHT DIST CCA EDV: 9.8 CM/SEC
BH CV XLRA MEAS RIGHT DIST CCA PSV: 77.9 CM/SEC
BH CV XLRA MEAS RIGHT DIST ICA EDV: 15.2 CM/SEC
BH CV XLRA MEAS RIGHT DIST ICA PSV: 58.2 CM/SEC
BH CV XLRA MEAS RIGHT ICA/CCA RATIO: 0.8
BH CV XLRA MEAS RIGHT MID CCA EDV: 9.7 CM/SEC
BH CV XLRA MEAS RIGHT MID CCA PSV: 76 CM/SEC
BH CV XLRA MEAS RIGHT MID ICA EDV: 9.7 CM/SEC
BH CV XLRA MEAS RIGHT MID ICA PSV: 37.1 CM/SEC
BH CV XLRA MEAS RIGHT PROX CCA EDV: 6.2 CM/SEC
BH CV XLRA MEAS RIGHT PROX CCA PSV: 60.9 CM/SEC
BH CV XLRA MEAS RIGHT PROX ECA EDV: 6.3 CM/SEC
BH CV XLRA MEAS RIGHT PROX ECA PSV: 67.5 CM/SEC
BH CV XLRA MEAS RIGHT PROX ICA EDV: 14.9 CM/SEC
BH CV XLRA MEAS RIGHT PROX ICA PSV: 62 CM/SEC
BH CV XLRA MEAS RIGHT PROX SCLA PSV: 86.91 CM/SEC
BH CV XLRA MEAS RIGHT VERTEBRAL A PSV: 26.2 CM/SEC
BILIRUB SERPL-MCNC: 0.8 MG/DL (ref 0–1.2)
BUN SERPL-MCNC: 13 MG/DL (ref 8–23)
BUN/CREAT SERPL: 14 (ref 7–25)
BUPRENORPHINE SERPL-MCNC: NEGATIVE NG/ML
CALCIUM SPEC-SCNC: 9.4 MG/DL (ref 8.6–10.5)
CANNABINOIDS SERPL QL: NEGATIVE
CHLORIDE SERPL-SCNC: 102 MMOL/L (ref 98–107)
CO2 SERPL-SCNC: 25 MMOL/L (ref 22–29)
COCAINE UR QL: NEGATIVE
CREAT SERPL-MCNC: 0.93 MG/DL (ref 0.76–1.27)
DEPRECATED RDW RBC AUTO: 44.2 FL (ref 37–54)
EGFRCR SERPLBLD CKD-EPI 2021: 87.2 ML/MIN/1.73
EOSINOPHIL # BLD AUTO: 0.28 10*3/MM3 (ref 0–0.4)
EOSINOPHIL NFR BLD AUTO: 4 % (ref 0.3–6.2)
ERYTHROCYTE [DISTWIDTH] IN BLOOD BY AUTOMATED COUNT: 13.9 % (ref 12.3–15.4)
FENTANYL UR-MCNC: NEGATIVE NG/ML
GLOBULIN UR ELPH-MCNC: 2.9 GM/DL
GLUCOSE SERPL-MCNC: 185 MG/DL (ref 65–99)
HBA1C MFR BLD: 7.5 % (ref 4.8–5.6)
HCT VFR BLD AUTO: 47.7 % (ref 37.5–51)
HGB BLD-MCNC: 15.8 G/DL (ref 13–17.7)
IMM GRANULOCYTES # BLD AUTO: 0.08 10*3/MM3 (ref 0–0.05)
IMM GRANULOCYTES NFR BLD AUTO: 1.1 % (ref 0–0.5)
INR PPP: 1.19 (ref 0.89–1.12)
LYMPHOCYTES # BLD AUTO: 1.71 10*3/MM3 (ref 0.7–3.1)
LYMPHOCYTES NFR BLD AUTO: 24.6 % (ref 19.6–45.3)
MAGNESIUM SERPL-MCNC: 1.9 MG/DL (ref 1.6–2.4)
MCH RBC QN AUTO: 29 PG (ref 26.6–33)
MCHC RBC AUTO-ENTMCNC: 33.1 G/DL (ref 31.5–35.7)
MCV RBC AUTO: 87.5 FL (ref 79–97)
METHADONE UR QL SCN: NEGATIVE
MONOCYTES # BLD AUTO: 0.57 10*3/MM3 (ref 0.1–0.9)
MONOCYTES NFR BLD AUTO: 8.2 % (ref 5–12)
NEUTROPHILS NFR BLD AUTO: 4.31 10*3/MM3 (ref 1.7–7)
NEUTROPHILS NFR BLD AUTO: 62 % (ref 42.7–76)
NRBC BLD AUTO-RTO: 0 /100 WBC (ref 0–0.2)
OPIATES UR QL: NEGATIVE
OXYCODONE UR QL SCN: NEGATIVE
PA ADP PRP-ACNC: 174 PRU
PCP UR QL SCN: NEGATIVE
PLATELET # BLD AUTO: 149 10*3/MM3 (ref 140–450)
PMV BLD AUTO: 10.9 FL (ref 6–12)
POTASSIUM SERPL-SCNC: 4.6 MMOL/L (ref 3.5–5.2)
PROT SERPL-MCNC: 7.1 G/DL (ref 6–8.5)
PROTHROMBIN TIME: 15.2 SECONDS (ref 12.2–14.5)
RBC # BLD AUTO: 5.45 10*6/MM3 (ref 4.14–5.8)
RH BLD: POSITIVE
SODIUM SERPL-SCNC: 138 MMOL/L (ref 136–145)
TRICYCLICS UR QL SCN: NEGATIVE
WBC NRBC COR # BLD AUTO: 6.96 10*3/MM3 (ref 3.4–10.8)

## 2024-08-23 PROCEDURE — 83036 HEMOGLOBIN GLYCOSYLATED A1C: CPT

## 2024-08-23 PROCEDURE — 86900 BLOOD TYPING SEROLOGIC ABO: CPT

## 2024-08-23 PROCEDURE — 85576 BLOOD PLATELET AGGREGATION: CPT

## 2024-08-23 PROCEDURE — 71046 X-RAY EXAM CHEST 2 VIEWS: CPT

## 2024-08-23 PROCEDURE — 83735 ASSAY OF MAGNESIUM: CPT

## 2024-08-23 PROCEDURE — 36415 COLL VENOUS BLD VENIPUNCTURE: CPT

## 2024-08-23 PROCEDURE — 80307 DRUG TEST PRSMV CHEM ANLYZR: CPT

## 2024-08-23 PROCEDURE — 94010 BREATHING CAPACITY TEST: CPT

## 2024-08-23 PROCEDURE — 85610 PROTHROMBIN TIME: CPT

## 2024-08-23 PROCEDURE — 85025 COMPLETE CBC W/AUTO DIFF WBC: CPT

## 2024-08-23 PROCEDURE — 93880 EXTRACRANIAL BILAT STUDY: CPT

## 2024-08-23 PROCEDURE — 86901 BLOOD TYPING SEROLOGIC RH(D): CPT

## 2024-08-23 PROCEDURE — 85730 THROMBOPLASTIN TIME PARTIAL: CPT

## 2024-08-23 PROCEDURE — 80053 COMPREHEN METABOLIC PANEL: CPT

## 2024-08-23 NOTE — PAT
Per david at office- pt rescheduled- pt aware- david states okay to still do pat.     98% ra so abg not needed.     EKG on chart 24    Too early to draw type and screen in PAT.  Please obtain blood bank specimen in pre-op on the day of surgery.    Patient directed to Radiology Department for CXR and pft after Pre Admission Testing Appointment.     Patient to apply Chlorhexadine wipes  to surgical area (as instructed) the night before procedure and the AM of procedure. Wipes provided.    Instructed patient to take 325 mg of Asprin (or four tablets of the 81 mg strength) the night before heart surgery as per CT surgeon's order.  Patient and/or family verbalized understanding.    Per Anesthesia Request, patient instructed not to take their ACE/ARB medications on the AM of surgery.    Patient instructed to drink 20 ounces of Gatorade or Gatorlyte (if diabetic) and it needs to be completed 1 hour (for Main OR patients) or 2 hours (scheduled  section & [facility] patients) before given arrival time for procedure (NO RED Gatorade and NO Gatorade Zero).    Patient verbalized understanding.    Bactroban to be applied to each nostril during PAT visit if surgery the following day.  If surgery NOT the following day, then Bactroban supplied to patient with instructions both written and verbally to insert Bactroban into each nares the night before surgery.    Pt worried about his beard being cut- so message left for MAMADOU to be aware and address dos.

## 2024-08-27 ENCOUNTER — PREP FOR SURGERY (OUTPATIENT)
Dept: OTHER | Facility: HOSPITAL | Age: 74
End: 2024-08-27

## 2024-08-27 DIAGNOSIS — I25.119 CORONARY ARTERY DISEASE INVOLVING NATIVE CORONARY ARTERY OF NATIVE HEART WITH ANGINA PECTORIS: Primary | ICD-10-CM

## 2024-09-02 ENCOUNTER — ANESTHESIA EVENT (OUTPATIENT)
Dept: PERIOP | Facility: HOSPITAL | Age: 74
End: 2024-09-02

## 2024-09-02 RX ORDER — FAMOTIDINE 10 MG/ML
20 INJECTION, SOLUTION INTRAVENOUS ONCE
Status: CANCELLED | OUTPATIENT
Start: 2024-09-02 | End: 2024-09-02

## 2024-09-03 ENCOUNTER — APPOINTMENT (OUTPATIENT)
Dept: GENERAL RADIOLOGY | Facility: HOSPITAL | Age: 74
DRG: 220 | End: 2024-09-03

## 2024-09-03 ENCOUNTER — ANESTHESIA (OUTPATIENT)
Dept: PERIOP | Facility: HOSPITAL | Age: 74
End: 2024-09-03

## 2024-09-03 ENCOUNTER — ANCILLARY PROCEDURE (OUTPATIENT)
Dept: PERIOP | Facility: HOSPITAL | Age: 74
DRG: 220 | End: 2024-09-03

## 2024-09-03 ENCOUNTER — ANESTHESIA EVENT CONVERTED (OUTPATIENT)
Dept: ANESTHESIOLOGY | Facility: HOSPITAL | Age: 74
DRG: 220 | End: 2024-09-03

## 2024-09-03 ENCOUNTER — HOSPITAL ENCOUNTER (INPATIENT)
Facility: HOSPITAL | Age: 74
LOS: 6 days | Discharge: HOME | DRG: 220 | End: 2024-09-09
Attending: THORACIC SURGERY (CARDIOTHORACIC VASCULAR SURGERY) | Admitting: THORACIC SURGERY (CARDIOTHORACIC VASCULAR SURGERY)

## 2024-09-03 DIAGNOSIS — I25.119 CORONARY ARTERY DISEASE INVOLVING NATIVE CORONARY ARTERY OF NATIVE HEART WITH ANGINA PECTORIS: ICD-10-CM

## 2024-09-03 PROBLEM — I50.9: Status: ACTIVE | Noted: 2024-09-03

## 2024-09-03 PROBLEM — I35.0 AORTIC STENOSIS: Status: ACTIVE | Noted: 2024-09-03

## 2024-09-03 PROBLEM — E11.9 TYPE 2 DIABETES MELLITUS: Status: ACTIVE | Noted: 2024-09-03

## 2024-09-03 PROBLEM — I10 BENIGN ESSENTIAL HTN: Status: ACTIVE | Noted: 2024-09-03

## 2024-09-03 PROBLEM — E78.5 DYSLIPIDEMIA: Status: ACTIVE | Noted: 2024-09-03

## 2024-09-03 LAB
ABO GROUP BLD: NORMAL
ACT BLD: 122 SECONDS (ref 82–152)
ACT BLD: 140 SECONDS (ref 82–152)
ACT BLD: 428 SECONDS (ref 82–152)
ACT BLD: 464 SECONDS (ref 82–152)
ACT BLD: 476 SECONDS (ref 82–152)
ACT BLD: 580 SECONDS (ref 82–152)
ACT BLD: 746 SECONDS (ref 82–152)
ACT BLD: 904 SECONDS (ref 82–152)
ACT BLD: >1000 SECONDS (ref 82–152)
ALBUMIN SERPL-MCNC: 3.4 G/DL (ref 3.5–5.2)
ALBUMIN SERPL-MCNC: 4.3 G/DL (ref 3.5–5.2)
ANION GAP SERPL CALCULATED.3IONS-SCNC: 10 MMOL/L (ref 5–15)
ANION GAP SERPL CALCULATED.3IONS-SCNC: 13 MMOL/L (ref 5–15)
APTT PPP: 27.8 SECONDS (ref 22–39)
APTT PPP: 36.1 SECONDS (ref 22–39)
ARTERIAL PATENCY WRIST A: ABNORMAL
ATMOSPHERIC PRESS: ABNORMAL MM[HG]
BASE EXCESS BLDA CALC-SCNC: -3.8 MMOL/L (ref 0–2)
BASE EXCESS BLDA CALC-SCNC: -4.6 MMOL/L (ref 0–2)
BASE EXCESS BLDA CALC-SCNC: -6.8 MMOL/L (ref 0–2)
BDY SITE: ABNORMAL
BLD GP AB SCN SERPL QL: NEGATIVE
BODY TEMPERATURE: 37
BUN SERPL-MCNC: 11 MG/DL (ref 8–23)
BUN SERPL-MCNC: 11 MG/DL (ref 8–23)
BUN/CREAT SERPL: 12.5 (ref 7–25)
BUN/CREAT SERPL: 13.6 (ref 7–25)
CA-I SERPL ISE-MCNC: 1.31 MMOL/L (ref 1.12–1.32)
CALCIUM SPEC-SCNC: 8.9 MG/DL (ref 8.6–10.5)
CALCIUM SPEC-SCNC: 8.9 MG/DL (ref 8.6–10.5)
CHLORIDE SERPL-SCNC: 112 MMOL/L (ref 98–107)
CHLORIDE SERPL-SCNC: 112 MMOL/L (ref 98–107)
CO2 BLDA-SCNC: 20.4 MMOL/L (ref 22–33)
CO2 BLDA-SCNC: 21.6 MMOL/L (ref 22–33)
CO2 BLDA-SCNC: 22.3 MMOL/L (ref 22–33)
CO2 SERPL-SCNC: 19 MMOL/L (ref 22–29)
CO2 SERPL-SCNC: 20 MMOL/L (ref 22–29)
COHGB MFR BLD: 1.1 % (ref 0–2)
COHGB MFR BLD: 1.3 % (ref 0–2)
COHGB MFR BLD: 1.3 % (ref 0–2)
CREAT SERPL-MCNC: 0.81 MG/DL (ref 0.76–1.27)
CREAT SERPL-MCNC: 0.88 MG/DL (ref 0.76–1.27)
DEPRECATED RDW RBC AUTO: 44.9 FL (ref 37–54)
DEPRECATED RDW RBC AUTO: 46.1 FL (ref 37–54)
EGFRCR SERPLBLD CKD-EPI 2021: 91.4 ML/MIN/1.73
EGFRCR SERPLBLD CKD-EPI 2021: 93.7 ML/MIN/1.73
EPAP: 0
ERYTHROCYTE [DISTWIDTH] IN BLOOD BY AUTOMATED COUNT: 14 % (ref 12.3–15.4)
ERYTHROCYTE [DISTWIDTH] IN BLOOD BY AUTOMATED COUNT: 14.2 % (ref 12.3–15.4)
GLUCOSE BLDC GLUCOMTR-MCNC: 136 MG/DL (ref 70–130)
GLUCOSE BLDC GLUCOMTR-MCNC: 136 MG/DL (ref 70–130)
GLUCOSE BLDC GLUCOMTR-MCNC: 153 MG/DL (ref 70–130)
GLUCOSE BLDC GLUCOMTR-MCNC: 165 MG/DL (ref 70–130)
GLUCOSE BLDC GLUCOMTR-MCNC: 179 MG/DL (ref 70–130)
GLUCOSE BLDC GLUCOMTR-MCNC: 77 MG/DL (ref 70–130)
GLUCOSE BLDC GLUCOMTR-MCNC: 78 MG/DL (ref 70–130)
GLUCOSE BLDC GLUCOMTR-MCNC: 94 MG/DL (ref 70–130)
GLUCOSE BLDC GLUCOMTR-MCNC: 94 MG/DL (ref 70–130)
GLUCOSE SERPL-MCNC: 168 MG/DL (ref 65–99)
GLUCOSE SERPL-MCNC: 94 MG/DL (ref 65–99)
HCO3 BLDA-SCNC: 19.2 MMOL/L (ref 20–26)
HCO3 BLDA-SCNC: 20.5 MMOL/L (ref 20–26)
HCO3 BLDA-SCNC: 21.1 MMOL/L (ref 20–26)
HCT VFR BLD AUTO: 37.4 % (ref 37.5–51)
HCT VFR BLD AUTO: 40.2 % (ref 37.5–51)
HCT VFR BLD CALC: 38.4 % (ref 38–51)
HCT VFR BLD CALC: 38.4 % (ref 38–51)
HCT VFR BLD CALC: 41.9 % (ref 38–51)
HGB BLD-MCNC: 12.3 G/DL (ref 13–17.7)
HGB BLD-MCNC: 13.4 G/DL (ref 13–17.7)
HGB BLDA-MCNC: 12.5 G/DL (ref 13.5–17.5)
HGB BLDA-MCNC: 12.5 G/DL (ref 13.5–17.5)
HGB BLDA-MCNC: 13.7 G/DL (ref 13.5–17.5)
INHALED O2 CONCENTRATION: 100 %
INHALED O2 CONCENTRATION: 40 %
INHALED O2 CONCENTRATION: 50 %
INR PPP: 1.16 (ref 0.89–1.12)
INR PPP: 1.88 (ref 0.89–1.12)
IPAP: 0
IPAP: 0
MAGNESIUM SERPL-MCNC: 2.2 MG/DL (ref 1.6–2.4)
MAGNESIUM SERPL-MCNC: 2.4 MG/DL (ref 1.6–2.4)
MCH RBC QN AUTO: 29.2 PG (ref 26.6–33)
MCH RBC QN AUTO: 29.3 PG (ref 26.6–33)
MCHC RBC AUTO-ENTMCNC: 32.9 G/DL (ref 31.5–35.7)
MCHC RBC AUTO-ENTMCNC: 33.3 G/DL (ref 31.5–35.7)
MCV RBC AUTO: 87.6 FL (ref 79–97)
MCV RBC AUTO: 89 FL (ref 79–97)
METHGB BLD QL: 0.1 % (ref 0–1.5)
METHGB BLD QL: 0.1 % (ref 0–1.5)
METHGB BLD QL: 0.2 % (ref 0–1.5)
MODALITY: ABNORMAL
OXYHGB MFR BLDV: 88.8 % (ref 94–99)
OXYHGB MFR BLDV: 91.6 % (ref 94–99)
OXYHGB MFR BLDV: 98.8 % (ref 94–99)
PAW @ PEAK INSP FLOW SETTING VENT: 0 CMH2O
PCO2 BLDA: 34.4 MM HG (ref 35–45)
PCO2 BLDA: 39 MM HG (ref 35–45)
PCO2 BLDA: 40.1 MM HG (ref 35–45)
PCO2 TEMP ADJ BLD: 34.4 MM HG (ref 35–48)
PCO2 TEMP ADJ BLD: 39 MM HG (ref 35–48)
PCO2 TEMP ADJ BLD: 40.1 MM HG (ref 35–48)
PEEP RESPIRATORY: 5 CM[H2O]
PH BLDA: 7.3 PH UNITS (ref 7.35–7.45)
PH BLDA: 7.33 PH UNITS (ref 7.35–7.45)
PH BLDA: 7.38 PH UNITS (ref 7.35–7.45)
PH, TEMP CORRECTED: 7.3 PH UNITS
PH, TEMP CORRECTED: 7.33 PH UNITS
PH, TEMP CORRECTED: 7.38 PH UNITS
PHOSPHATE SERPL-MCNC: 2.7 MG/DL (ref 2.5–4.5)
PHOSPHATE SERPL-MCNC: 3 MG/DL (ref 2.5–4.5)
PLATELET # BLD AUTO: 109 10*3/MM3 (ref 140–450)
PLATELET # BLD AUTO: 117 10*3/MM3 (ref 140–450)
PMV BLD AUTO: 10.5 FL (ref 6–12)
PMV BLD AUTO: 10.8 FL (ref 6–12)
PO2 BLDA: 160 MM HG (ref 83–108)
PO2 BLDA: 64.3 MM HG (ref 83–108)
PO2 BLDA: 69.8 MM HG (ref 83–108)
PO2 TEMP ADJ BLD: 160 MM HG (ref 83–108)
PO2 TEMP ADJ BLD: 64.3 MM HG (ref 83–108)
PO2 TEMP ADJ BLD: 69.8 MM HG (ref 83–108)
POTASSIUM SERPL-SCNC: 4 MMOL/L (ref 3.5–5.2)
POTASSIUM SERPL-SCNC: 4.4 MMOL/L (ref 3.5–5.2)
POTASSIUM SERPL-SCNC: 4.5 MMOL/L (ref 3.5–5.2)
PROTHROMBIN TIME: 14.9 SECONDS (ref 12.2–14.5)
PROTHROMBIN TIME: 21.7 SECONDS (ref 12.2–14.5)
PSV: 10 CMH2O
RBC # BLD AUTO: 4.2 10*6/MM3 (ref 4.14–5.8)
RBC # BLD AUTO: 4.59 10*6/MM3 (ref 4.14–5.8)
RH BLD: POSITIVE
SODIUM SERPL-SCNC: 142 MMOL/L (ref 136–145)
SODIUM SERPL-SCNC: 144 MMOL/L (ref 136–145)
T&S EXPIRATION DATE: NORMAL
TOTAL RATE: 0 BREATHS/MINUTE
TOTAL RATE: 0 BREATHS/MINUTE
TOTAL RATE: 14 BREATHS/MINUTE
VENTILATOR MODE: ABNORMAL
VT ON VENT VENT: 0.69 ML
WBC NRBC COR # BLD AUTO: 15.95 10*3/MM3 (ref 3.4–10.8)
WBC NRBC COR # BLD AUTO: 16.36 10*3/MM3 (ref 3.4–10.8)

## 2024-09-03 PROCEDURE — 94799 UNLISTED PULMONARY SVC/PX: CPT

## 2024-09-03 PROCEDURE — 25010000002 CEFAZOLIN PER 500 MG: Performed by: ANESTHESIOLOGY

## 2024-09-03 PROCEDURE — C1889 IMPLANT/INSERT DEVICE, NOC: HCPCS | Performed by: THORACIC SURGERY (CARDIOTHORACIC VASCULAR SURGERY)

## 2024-09-03 PROCEDURE — 25010000002 EPINEPHRINE PER 0.1 MG: Performed by: ANESTHESIOLOGY

## 2024-09-03 PROCEDURE — 93010 ELECTROCARDIOGRAM REPORT: CPT | Performed by: INTERNAL MEDICINE

## 2024-09-03 PROCEDURE — 84295 ASSAY OF SERUM SODIUM: CPT

## 2024-09-03 PROCEDURE — 25010000002 GLYCOPYRROLATE 1 MG/5ML SOLUTION: Performed by: ANESTHESIOLOGY

## 2024-09-03 PROCEDURE — 82947 ASSAY GLUCOSE BLOOD QUANT: CPT

## 2024-09-03 PROCEDURE — P9041 ALBUMIN (HUMAN),5%, 50ML: HCPCS | Performed by: ANESTHESIOLOGY

## 2024-09-03 PROCEDURE — 82803 BLOOD GASES ANY COMBINATION: CPT

## 2024-09-03 PROCEDURE — 86923 COMPATIBILITY TEST ELECTRIC: CPT

## 2024-09-03 PROCEDURE — P9041 ALBUMIN (HUMAN),5%, 50ML: HCPCS | Performed by: THORACIC SURGERY (CARDIOTHORACIC VASCULAR SURGERY)

## 2024-09-03 PROCEDURE — 85730 THROMBOPLASTIN TIME PARTIAL: CPT | Performed by: PHYSICIAN ASSISTANT

## 2024-09-03 PROCEDURE — 85610 PROTHROMBIN TIME: CPT | Performed by: PHYSICIAN ASSISTANT

## 2024-09-03 PROCEDURE — 25010000002 PROTAMINE SULFATE PER 10 MG: Performed by: ANESTHESIOLOGY

## 2024-09-03 PROCEDURE — 33405 REPLACEMENT AORTIC VALVE OPN: CPT | Performed by: THORACIC SURGERY (CARDIOTHORACIC VASCULAR SURGERY)

## 2024-09-03 PROCEDURE — 94002 VENT MGMT INPAT INIT DAY: CPT

## 2024-09-03 PROCEDURE — 25010000002 ALBUMIN HUMAN 5% PER 50 ML: Performed by: ANESTHESIOLOGY

## 2024-09-03 PROCEDURE — P9041 ALBUMIN (HUMAN),5%, 50ML: HCPCS | Performed by: PHYSICIAN ASSISTANT

## 2024-09-03 PROCEDURE — 82330 ASSAY OF CALCIUM: CPT

## 2024-09-03 PROCEDURE — 93318 ECHO TRANSESOPHAGEAL INTRAOP: CPT | Performed by: ANESTHESIOLOGY

## 2024-09-03 PROCEDURE — P9041 ALBUMIN (HUMAN),5%, 50ML: HCPCS

## 2024-09-03 PROCEDURE — 88311 DECALCIFY TISSUE: CPT | Performed by: THORACIC SURGERY (CARDIOTHORACIC VASCULAR SURGERY)

## 2024-09-03 PROCEDURE — 85730 THROMBOPLASTIN TIME PARTIAL: CPT

## 2024-09-03 PROCEDURE — 25010000002 HEPARIN (PORCINE) PER 1000 UNITS: Performed by: THORACIC SURGERY (CARDIOTHORACIC VASCULAR SURGERY)

## 2024-09-03 PROCEDURE — 25010000002 FENTANYL CITRATE (PF) 1000 MCG/20ML SOLUTION: Performed by: ANESTHESIOLOGY

## 2024-09-03 PROCEDURE — 25010000002 ALBUMIN HUMAN 5% PER 50 ML: Performed by: PHYSICIAN ASSISTANT

## 2024-09-03 PROCEDURE — 25010000002 ACETAMINOPHEN 10 MG/ML SOLUTION: Performed by: PHYSICIAN ASSISTANT

## 2024-09-03 PROCEDURE — 25010000002 ESMOLOL 100 MG/10ML SOLUTION: Performed by: ANESTHESIOLOGY

## 2024-09-03 PROCEDURE — 25010000002 ALBUMIN HUMAN 5% PER 50 ML

## 2024-09-03 PROCEDURE — 02RF08Z REPLACEMENT OF AORTIC VALVE WITH ZOOPLASTIC TISSUE, OPEN APPROACH: ICD-10-PCS | Performed by: THORACIC SURGERY (CARDIOTHORACIC VASCULAR SURGERY)

## 2024-09-03 PROCEDURE — 84132 ASSAY OF SERUM POTASSIUM: CPT | Performed by: ANESTHESIOLOGY

## 2024-09-03 PROCEDURE — 25010000002 ALBUMIN HUMAN 5% PER 50 ML: Performed by: THORACIC SURGERY (CARDIOTHORACIC VASCULAR SURGERY)

## 2024-09-03 PROCEDURE — 86900 BLOOD TYPING SEROLOGIC ABO: CPT

## 2024-09-03 PROCEDURE — 25010000002 PAPAVERINE PER 60 MG: Performed by: THORACIC SURGERY (CARDIOTHORACIC VASCULAR SURGERY)

## 2024-09-03 PROCEDURE — 25010000002 NICARDIPINE 2.5 MG/ML SOLUTION: Performed by: ANESTHESIOLOGY

## 2024-09-03 PROCEDURE — A4648 IMPLANTABLE TISSUE MARKER: HCPCS | Performed by: THORACIC SURGERY (CARDIOTHORACIC VASCULAR SURGERY)

## 2024-09-03 PROCEDURE — 33521 CABG ARTERY-VEIN FOUR: CPT | Performed by: THORACIC SURGERY (CARDIOTHORACIC VASCULAR SURGERY)

## 2024-09-03 PROCEDURE — 33508 ENDOSCOPIC VEIN HARVEST: CPT | Performed by: THORACIC SURGERY (CARDIOTHORACIC VASCULAR SURGERY)

## 2024-09-03 PROCEDURE — 86901 BLOOD TYPING SEROLOGIC RH(D): CPT

## 2024-09-03 PROCEDURE — 86850 RBC ANTIBODY SCREEN: CPT

## 2024-09-03 PROCEDURE — 63710000001 INSULIN REGULAR HUMAN PER 5 UNITS: Performed by: ANESTHESIOLOGY

## 2024-09-03 PROCEDURE — 25810000003 SODIUM CHLORIDE PER 500 ML: Performed by: THORACIC SURGERY (CARDIOTHORACIC VASCULAR SURGERY)

## 2024-09-03 PROCEDURE — 02100Z9 BYPASS CORONARY ARTERY, ONE ARTERY FROM LEFT INTERNAL MAMMARY, OPEN APPROACH: ICD-10-PCS | Performed by: THORACIC SURGERY (CARDIOTHORACIC VASCULAR SURGERY)

## 2024-09-03 PROCEDURE — 021309W BYPASS CORONARY ARTERY, FOUR OR MORE ARTERIES FROM AORTA WITH AUTOLOGOUS VENOUS TISSUE, OPEN APPROACH: ICD-10-PCS | Performed by: THORACIC SURGERY (CARDIOTHORACIC VASCULAR SURGERY)

## 2024-09-03 PROCEDURE — 25010000002 HEPARIN (PORCINE) PER 1000 UNITS: Performed by: ANESTHESIOLOGY

## 2024-09-03 PROCEDURE — 25010000002 MORPHINE PER 10 MG: Performed by: THORACIC SURGERY (CARDIOTHORACIC VASCULAR SURGERY)

## 2024-09-03 PROCEDURE — 25810000003 SODIUM CHLORIDE 0.9 % SOLUTION: Performed by: ANESTHESIOLOGY

## 2024-09-03 PROCEDURE — 80069 RENAL FUNCTION PANEL: CPT | Performed by: PHYSICIAN ASSISTANT

## 2024-09-03 PROCEDURE — 5A1221Z PERFORMANCE OF CARDIAC OUTPUT, CONTINUOUS: ICD-10-PCS | Performed by: THORACIC SURGERY (CARDIOTHORACIC VASCULAR SURGERY)

## 2024-09-03 PROCEDURE — 99291 CRITICAL CARE FIRST HOUR: CPT | Performed by: INTERNAL MEDICINE

## 2024-09-03 PROCEDURE — 25010000002 PROPOFOL 10 MG/ML EMULSION: Performed by: PHYSICIAN ASSISTANT

## 2024-09-03 PROCEDURE — 25010000002 ACETAMINOPHEN 10 MG/ML SOLUTION: Performed by: ANESTHESIOLOGY

## 2024-09-03 PROCEDURE — 82805 BLOOD GASES W/O2 SATURATION: CPT

## 2024-09-03 PROCEDURE — 99253 IP/OBS CNSLTJ NEW/EST LOW 45: CPT | Performed by: PHYSICIAN ASSISTANT

## 2024-09-03 PROCEDURE — 71045 X-RAY EXAM CHEST 1 VIEW: CPT

## 2024-09-03 PROCEDURE — 25010000002 AMIODARONE PER 30 MG: Performed by: ANESTHESIOLOGY

## 2024-09-03 PROCEDURE — 82330 ASSAY OF CALCIUM: CPT | Performed by: PHYSICIAN ASSISTANT

## 2024-09-03 PROCEDURE — 33533 CABG ARTERIAL SINGLE: CPT | Performed by: THORACIC SURGERY (CARDIOTHORACIC VASCULAR SURGERY)

## 2024-09-03 PROCEDURE — 85027 COMPLETE CBC AUTOMATED: CPT | Performed by: PHYSICIAN ASSISTANT

## 2024-09-03 PROCEDURE — 85610 PROTHROMBIN TIME: CPT

## 2024-09-03 PROCEDURE — 85014 HEMATOCRIT: CPT

## 2024-09-03 PROCEDURE — 25010000002 CEFAZOLIN PER 500 MG

## 2024-09-03 PROCEDURE — 06BP4ZZ EXCISION OF RIGHT SAPHENOUS VEIN, PERCUTANEOUS ENDOSCOPIC APPROACH: ICD-10-PCS | Performed by: THORACIC SURGERY (CARDIOTHORACIC VASCULAR SURGERY)

## 2024-09-03 PROCEDURE — 25810000003 LACTATED RINGERS PER 1000 ML: Performed by: ANESTHESIOLOGY

## 2024-09-03 PROCEDURE — 83050 HGB METHEMOGLOBIN QUAN: CPT

## 2024-09-03 PROCEDURE — 82948 REAGENT STRIP/BLOOD GLUCOSE: CPT

## 2024-09-03 PROCEDURE — 83735 ASSAY OF MAGNESIUM: CPT | Performed by: PHYSICIAN ASSISTANT

## 2024-09-03 PROCEDURE — 25010000002 MIDAZOLAM PER 1 MG: Performed by: ANESTHESIOLOGY

## 2024-09-03 PROCEDURE — C1751 CATH, INF, PER/CENT/MIDLINE: HCPCS | Performed by: ANESTHESIOLOGY

## 2024-09-03 PROCEDURE — 94660 CPAP INITIATION&MGMT: CPT

## 2024-09-03 PROCEDURE — 25010000002 PROPOFOL 1000 MG/ML EMULSION: Performed by: ANESTHESIOLOGY

## 2024-09-03 PROCEDURE — 25810000003 DEXTROSE 5 % WITH KCL 20 MEQ 20 MEQ/L SOLUTION: Performed by: PHYSICIAN ASSISTANT

## 2024-09-03 PROCEDURE — 85347 COAGULATION TIME ACTIVATED: CPT

## 2024-09-03 PROCEDURE — 88305 TISSUE EXAM BY PATHOLOGIST: CPT | Performed by: THORACIC SURGERY (CARDIOTHORACIC VASCULAR SURGERY)

## 2024-09-03 PROCEDURE — 82375 ASSAY CARBOXYHB QUANT: CPT

## 2024-09-03 PROCEDURE — 25810000003 SODIUM CHLORIDE 0.9 % SOLUTION 250 ML FLEX CONT: Performed by: ANESTHESIOLOGY

## 2024-09-03 PROCEDURE — 84132 ASSAY OF SERUM POTASSIUM: CPT

## 2024-09-03 PROCEDURE — 93005 ELECTROCARDIOGRAM TRACING: CPT | Performed by: PHYSICIAN ASSISTANT

## 2024-09-03 PROCEDURE — B246ZZ4 ULTRASONOGRAPHY OF RIGHT AND LEFT HEART, TRANSESOPHAGEAL: ICD-10-PCS | Performed by: ANESTHESIOLOGY

## 2024-09-03 PROCEDURE — 25010000002 CEFAZOLIN PER 500 MG: Performed by: INTERNAL MEDICINE

## 2024-09-03 DEVICE — DISK-SHAPED STYLE, SILICONE (1 PER STERILE PKG)
Type: IMPLANTABLE DEVICE | Site: HEART | Status: FUNCTIONAL
Brand: SCANLAN® RADIOMARK® GRAFT MARKERS

## 2024-09-03 DEVICE — VLV AORTA INSPRIS RESILIA 23MM: Type: IMPLANTABLE DEVICE | Site: HEART | Status: FUNCTIONAL

## 2024-09-03 RX ORDER — SODIUM CHLORIDE, SODIUM LACTATE, POTASSIUM CHLORIDE, CALCIUM CHLORIDE 600; 310; 30; 20 MG/100ML; MG/100ML; MG/100ML; MG/100ML
9 INJECTION, SOLUTION INTRAVENOUS CONTINUOUS
Status: DISCONTINUED | OUTPATIENT
Start: 2024-09-03 | End: 2024-09-05

## 2024-09-03 RX ORDER — SODIUM CHLORIDE 9 MG/ML
40 INJECTION, SOLUTION INTRAVENOUS AS NEEDED
Status: DISCONTINUED | OUTPATIENT
Start: 2024-09-03 | End: 2024-09-03 | Stop reason: HOSPADM

## 2024-09-03 RX ORDER — MAGNESIUM HYDROXIDE 1200 MG/15ML
LIQUID ORAL AS NEEDED
Status: DISCONTINUED | OUTPATIENT
Start: 2024-09-03 | End: 2024-09-03 | Stop reason: HOSPADM

## 2024-09-03 RX ORDER — ALBUTEROL SULFATE 0.83 MG/ML
2.5 SOLUTION RESPIRATORY (INHALATION) EVERY 4 HOURS PRN
Status: ACTIVE | OUTPATIENT
Start: 2024-09-03 | End: 2024-09-04

## 2024-09-03 RX ORDER — POLYETHYLENE GLYCOL 3350 17 G/17G
17 POWDER, FOR SOLUTION ORAL DAILY PRN
Status: DISCONTINUED | OUTPATIENT
Start: 2024-09-03 | End: 2024-09-07 | Stop reason: SDUPTHER

## 2024-09-03 RX ORDER — SODIUM CHLORIDE 9 MG/ML
INJECTION, SOLUTION INTRAVENOUS CONTINUOUS PRN
Status: DISCONTINUED | OUTPATIENT
Start: 2024-09-03 | End: 2024-09-03 | Stop reason: SURG

## 2024-09-03 RX ORDER — NICOTINE POLACRILEX 4 MG
15 LOZENGE BUCCAL
Status: DISCONTINUED | OUTPATIENT
Start: 2024-09-03 | End: 2024-09-04

## 2024-09-03 RX ORDER — ATORVASTATIN CALCIUM 40 MG/1
40 TABLET, FILM COATED ORAL NIGHTLY
Status: DISCONTINUED | OUTPATIENT
Start: 2024-09-03 | End: 2024-09-05

## 2024-09-03 RX ORDER — SODIUM CHLORIDE 0.9 % (FLUSH) 0.9 %
10 SYRINGE (ML) INJECTION EVERY 12 HOURS SCHEDULED
Status: DISCONTINUED | OUTPATIENT
Start: 2024-09-03 | End: 2024-09-03 | Stop reason: HOSPADM

## 2024-09-03 RX ORDER — ASPIRIN 325 MG
325 TABLET ORAL ONCE
Status: COMPLETED | OUTPATIENT
Start: 2024-09-03 | End: 2024-09-03

## 2024-09-03 RX ORDER — HYDROCODONE BITARTRATE AND ACETAMINOPHEN 7.5; 325 MG/1; MG/1
1 TABLET ORAL EVERY 4 HOURS PRN
Status: DISCONTINUED | OUTPATIENT
Start: 2024-09-03 | End: 2024-09-09 | Stop reason: HOSPADM

## 2024-09-03 RX ORDER — FENTANYL CITRATE 50 UG/ML
25 INJECTION, SOLUTION INTRAMUSCULAR; INTRAVENOUS
Status: DISCONTINUED | OUTPATIENT
Start: 2024-09-03 | End: 2024-09-05

## 2024-09-03 RX ORDER — EPINEPHRINE 1 MG/ML
INJECTION, SOLUTION, CONCENTRATE INTRAVENOUS AS NEEDED
Status: DISCONTINUED | OUTPATIENT
Start: 2024-09-03 | End: 2024-09-03 | Stop reason: SURG

## 2024-09-03 RX ORDER — CEFAZOLIN SODIUM 1 G/3ML
INJECTION, POWDER, FOR SOLUTION INTRAMUSCULAR; INTRAVENOUS AS NEEDED
Status: DISCONTINUED | OUTPATIENT
Start: 2024-09-03 | End: 2024-09-03 | Stop reason: SURG

## 2024-09-03 RX ORDER — HEPARIN SODIUM 1000 [USP'U]/ML
INJECTION, SOLUTION INTRAVENOUS; SUBCUTANEOUS AS NEEDED
Status: DISCONTINUED | OUTPATIENT
Start: 2024-09-03 | End: 2024-09-03 | Stop reason: SURG

## 2024-09-03 RX ORDER — FAMOTIDINE 20 MG/1
20 TABLET, FILM COATED ORAL
Status: DISCONTINUED | OUTPATIENT
Start: 2024-09-03 | End: 2024-09-09 | Stop reason: HOSPADM

## 2024-09-03 RX ORDER — NITROGLYCERIN 20 MG/100ML
5-200 INJECTION INTRAVENOUS CONTINUOUS PRN
Status: DISCONTINUED | OUTPATIENT
Start: 2024-09-03 | End: 2024-09-06

## 2024-09-03 RX ORDER — GLYCOPYRROLATE 0.2 MG/ML
INJECTION INTRAMUSCULAR; INTRAVENOUS AS NEEDED
Status: DISCONTINUED | OUTPATIENT
Start: 2024-09-03 | End: 2024-09-03 | Stop reason: SURG

## 2024-09-03 RX ORDER — MEPERIDINE HYDROCHLORIDE 25 MG/ML
25 INJECTION INTRAMUSCULAR; INTRAVENOUS; SUBCUTANEOUS EVERY 4 HOURS PRN
Status: ACTIVE | OUTPATIENT
Start: 2024-09-03 | End: 2024-09-04

## 2024-09-03 RX ORDER — ETOMIDATE 2 MG/ML
INJECTION INTRAVENOUS AS NEEDED
Status: DISCONTINUED | OUTPATIENT
Start: 2024-09-03 | End: 2024-09-03 | Stop reason: SURG

## 2024-09-03 RX ORDER — MORPHINE SULFATE 2 MG/ML
2 INJECTION, SOLUTION INTRAMUSCULAR; INTRAVENOUS
Status: DISCONTINUED | OUTPATIENT
Start: 2024-09-03 | End: 2024-09-05

## 2024-09-03 RX ORDER — CALCIUM CHLORIDE 100 MG/ML
INJECTION INTRAVENOUS; INTRAVENTRICULAR AS NEEDED
Status: DISCONTINUED | OUTPATIENT
Start: 2024-09-03 | End: 2024-09-03 | Stop reason: SURG

## 2024-09-03 RX ORDER — DEXTROSE MONOHYDRATE 25 G/50ML
10-50 INJECTION, SOLUTION INTRAVENOUS
Status: DISCONTINUED | OUTPATIENT
Start: 2024-09-03 | End: 2024-09-04

## 2024-09-03 RX ORDER — NITROGLYCERIN 0.4 MG/1
0.4 TABLET SUBLINGUAL
Status: DISCONTINUED | OUTPATIENT
Start: 2024-09-03 | End: 2024-09-03 | Stop reason: HOSPADM

## 2024-09-03 RX ORDER — PROTAMINE SULFATE 10 MG/ML
INJECTION, SOLUTION INTRAVENOUS AS NEEDED
Status: DISCONTINUED | OUTPATIENT
Start: 2024-09-03 | End: 2024-09-03 | Stop reason: SURG

## 2024-09-03 RX ORDER — SODIUM CHLORIDE 9 MG/ML
INJECTION, SOLUTION INTRAVENOUS AS NEEDED
Status: DISCONTINUED | OUTPATIENT
Start: 2024-09-03 | End: 2024-09-03 | Stop reason: HOSPADM

## 2024-09-03 RX ORDER — ACETAMINOPHEN 325 MG/1
650 TABLET ORAL EVERY 8 HOURS
Status: DISCONTINUED | OUTPATIENT
Start: 2024-09-04 | End: 2024-09-06

## 2024-09-03 RX ORDER — DOPAMINE HYDROCHLORIDE 160 MG/100ML
2-20 INJECTION, SOLUTION INTRAVENOUS CONTINUOUS PRN
Status: DISCONTINUED | OUTPATIENT
Start: 2024-09-03 | End: 2024-09-04

## 2024-09-03 RX ORDER — PAPAVERINE HYDROCHLORIDE 30 MG/ML
INJECTION INTRAMUSCULAR; INTRAVENOUS AS NEEDED
Status: DISCONTINUED | OUTPATIENT
Start: 2024-09-03 | End: 2024-09-03 | Stop reason: HOSPADM

## 2024-09-03 RX ORDER — CHLORHEXIDINE GLUCONATE ORAL RINSE 1.2 MG/ML
15 SOLUTION DENTAL ONCE
Status: COMPLETED | OUTPATIENT
Start: 2024-09-03 | End: 2024-09-03

## 2024-09-03 RX ORDER — ALBUMIN, HUMAN INJ 5% 5 %
SOLUTION INTRAVENOUS CONTINUOUS PRN
Status: DISCONTINUED | OUTPATIENT
Start: 2024-09-03 | End: 2024-09-03 | Stop reason: SURG

## 2024-09-03 RX ORDER — MIDAZOLAM HYDROCHLORIDE 1 MG/ML
0.5 INJECTION INTRAMUSCULAR; INTRAVENOUS
Status: DISCONTINUED | OUTPATIENT
Start: 2024-09-03 | End: 2024-09-03 | Stop reason: HOSPADM

## 2024-09-03 RX ORDER — ALBUMIN, HUMAN INJ 5% 5 %
SOLUTION INTRAVENOUS
Status: COMPLETED
Start: 2024-09-03 | End: 2024-09-03

## 2024-09-03 RX ORDER — NICARDIPINE HYDROCHLORIDE 2.5 MG/ML
INJECTION INTRAVENOUS AS NEEDED
Status: DISCONTINUED | OUTPATIENT
Start: 2024-09-03 | End: 2024-09-03 | Stop reason: SURG

## 2024-09-03 RX ORDER — FAMOTIDINE 20 MG/1
20 TABLET, FILM COATED ORAL ONCE
Status: COMPLETED | OUTPATIENT
Start: 2024-09-03 | End: 2024-09-03

## 2024-09-03 RX ORDER — LIDOCAINE HYDROCHLORIDE 10 MG/ML
0.5 INJECTION, SOLUTION EPIDURAL; INFILTRATION; INTRACAUDAL; PERINEURAL ONCE AS NEEDED
Status: DISCONTINUED | OUTPATIENT
Start: 2024-09-03 | End: 2024-09-03 | Stop reason: HOSPADM

## 2024-09-03 RX ORDER — DOBUTAMINE HYDROCHLORIDE 100 MG/100ML
2-20 INJECTION INTRAVENOUS CONTINUOUS PRN
Status: DISCONTINUED | OUTPATIENT
Start: 2024-09-03 | End: 2024-09-04

## 2024-09-03 RX ORDER — BUPIVACAINE HCL/0.9 % NACL/PF 0.125 %
PLASTIC BAG, INJECTION (ML) EPIDURAL AS NEEDED
Status: DISCONTINUED | OUTPATIENT
Start: 2024-09-03 | End: 2024-09-03 | Stop reason: SURG

## 2024-09-03 RX ORDER — BISACODYL 10 MG
10 SUPPOSITORY, RECTAL RECTAL DAILY PRN
Status: DISCONTINUED | OUTPATIENT
Start: 2024-09-03 | End: 2024-09-07 | Stop reason: SDUPTHER

## 2024-09-03 RX ORDER — ACETAMINOPHEN 10 MG/ML
INJECTION, SOLUTION INTRAVENOUS AS NEEDED
Status: DISCONTINUED | OUTPATIENT
Start: 2024-09-03 | End: 2024-09-03 | Stop reason: SURG

## 2024-09-03 RX ORDER — ROCURONIUM BROMIDE 10 MG/ML
INJECTION, SOLUTION INTRAVENOUS AS NEEDED
Status: DISCONTINUED | OUTPATIENT
Start: 2024-09-03 | End: 2024-09-03 | Stop reason: SURG

## 2024-09-03 RX ORDER — AMIODARONE HYDROCHLORIDE 150 MG/3ML
INJECTION, SOLUTION INTRAVENOUS AS NEEDED
Status: DISCONTINUED | OUTPATIENT
Start: 2024-09-03 | End: 2024-09-03 | Stop reason: SURG

## 2024-09-03 RX ORDER — OXYCODONE HYDROCHLORIDE 10 MG/1
10 TABLET ORAL EVERY 4 HOURS PRN
Status: DISCONTINUED | OUTPATIENT
Start: 2024-09-03 | End: 2024-09-09 | Stop reason: HOSPADM

## 2024-09-03 RX ORDER — MIDAZOLAM HYDROCHLORIDE 1 MG/ML
INJECTION INTRAMUSCULAR; INTRAVENOUS AS NEEDED
Status: DISCONTINUED | OUTPATIENT
Start: 2024-09-03 | End: 2024-09-03 | Stop reason: SURG

## 2024-09-03 RX ORDER — PROTAMINE SULFATE 10 MG/ML
50 INJECTION, SOLUTION INTRAVENOUS ONCE
Status: DISCONTINUED | OUTPATIENT
Start: 2024-09-03 | End: 2024-09-09

## 2024-09-03 RX ORDER — ASPIRIN 325 MG
325 TABLET, DELAYED RELEASE (ENTERIC COATED) ORAL DAILY
Status: DISCONTINUED | OUTPATIENT
Start: 2024-09-04 | End: 2024-09-05

## 2024-09-03 RX ORDER — NITROGLYCERIN 0.4 MG/1
0.4 TABLET SUBLINGUAL
Status: DISCONTINUED | OUTPATIENT
Start: 2024-09-03 | End: 2024-09-07 | Stop reason: SDUPTHER

## 2024-09-03 RX ORDER — GABAPENTIN 300 MG/1
300 CAPSULE ORAL ONCE
Status: COMPLETED | OUTPATIENT
Start: 2024-09-03 | End: 2024-09-03

## 2024-09-03 RX ORDER — EPHEDRINE SULFATE 50 MG/ML
INJECTION INTRAVENOUS AS NEEDED
Status: DISCONTINUED | OUTPATIENT
Start: 2024-09-03 | End: 2024-09-03 | Stop reason: SURG

## 2024-09-03 RX ORDER — CHLORHEXIDINE GLUCONATE ORAL RINSE 1.2 MG/ML
15 SOLUTION DENTAL EVERY 12 HOURS SCHEDULED
Status: DISCONTINUED | OUTPATIENT
Start: 2024-09-03 | End: 2024-09-06

## 2024-09-03 RX ORDER — ESMOLOL HYDROCHLORIDE 10 MG/ML
INJECTION INTRAVENOUS AS NEEDED
Status: DISCONTINUED | OUTPATIENT
Start: 2024-09-03 | End: 2024-09-03 | Stop reason: SURG

## 2024-09-03 RX ORDER — METOPROLOL TARTRATE 1 MG/ML
2.5 INJECTION, SOLUTION INTRAVENOUS EVERY 6 HOURS SCHEDULED
Status: DISPENSED | OUTPATIENT
Start: 2024-09-03 | End: 2024-09-04

## 2024-09-03 RX ORDER — ALBUMIN, HUMAN INJ 5% 5 %
250 SOLUTION INTRAVENOUS AS NEEDED
Status: COMPLETED | OUTPATIENT
Start: 2024-09-03 | End: 2024-09-03

## 2024-09-03 RX ORDER — CHLORHEXIDINE GLUCONATE 500 MG/1
CLOTH TOPICAL EVERY 12 HOURS PRN
Status: DISCONTINUED | OUTPATIENT
Start: 2024-09-03 | End: 2024-09-03 | Stop reason: HOSPADM

## 2024-09-03 RX ORDER — AMOXICILLIN 250 MG
2 CAPSULE ORAL 2 TIMES DAILY
Status: DISCONTINUED | OUTPATIENT
Start: 2024-09-03 | End: 2024-09-07 | Stop reason: SDUPTHER

## 2024-09-03 RX ORDER — AMINOCAPROIC ACID 250 MG/ML
INJECTION, SOLUTION INTRAVENOUS AS NEEDED
Status: DISCONTINUED | OUTPATIENT
Start: 2024-09-03 | End: 2024-09-03 | Stop reason: SURG

## 2024-09-03 RX ORDER — ACETAMINOPHEN 10 MG/ML
1000 INJECTION, SOLUTION INTRAVENOUS ONCE
Status: COMPLETED | OUTPATIENT
Start: 2024-09-03 | End: 2024-09-03

## 2024-09-03 RX ORDER — NOREPINEPHRINE BITARTRATE 0.03 MG/ML
.02-.3 INJECTION, SOLUTION INTRAVENOUS CONTINUOUS PRN
Status: DISCONTINUED | OUTPATIENT
Start: 2024-09-03 | End: 2024-09-05

## 2024-09-03 RX ORDER — NOREPINEPHRINE BITARTRATE 0.03 MG/ML
INJECTION, SOLUTION INTRAVENOUS CONTINUOUS PRN
Status: DISCONTINUED | OUTPATIENT
Start: 2024-09-03 | End: 2024-09-03 | Stop reason: SURG

## 2024-09-03 RX ORDER — IBUPROFEN 600 MG/1
1 TABLET ORAL
Status: DISCONTINUED | OUTPATIENT
Start: 2024-09-03 | End: 2024-09-04

## 2024-09-03 RX ORDER — FENTANYL CITRATE 0.05 MG/ML
INJECTION, SOLUTION INTRAMUSCULAR; INTRAVENOUS AS NEEDED
Status: DISCONTINUED | OUTPATIENT
Start: 2024-09-03 | End: 2024-09-03 | Stop reason: SURG

## 2024-09-03 RX ORDER — GABAPENTIN 100 MG/1
100 CAPSULE ORAL EVERY 8 HOURS
Status: DISCONTINUED | OUTPATIENT
Start: 2024-09-03 | End: 2024-09-03

## 2024-09-03 RX ORDER — ALBUMIN, HUMAN INJ 5% 5 %
500 SOLUTION INTRAVENOUS ONCE
Status: COMPLETED | OUTPATIENT
Start: 2024-09-03 | End: 2024-09-03

## 2024-09-03 RX ORDER — DEXMEDETOMIDINE HYDROCHLORIDE 4 UG/ML
.2-1.5 INJECTION, SOLUTION INTRAVENOUS CONTINUOUS PRN
Status: DISCONTINUED | OUTPATIENT
Start: 2024-09-03 | End: 2024-09-04

## 2024-09-03 RX ORDER — SODIUM CHLORIDE 0.9 % (FLUSH) 0.9 %
10 SYRINGE (ML) INJECTION AS NEEDED
Status: DISCONTINUED | OUTPATIENT
Start: 2024-09-03 | End: 2024-09-03 | Stop reason: HOSPADM

## 2024-09-03 RX ORDER — ONDANSETRON 2 MG/ML
4 INJECTION INTRAMUSCULAR; INTRAVENOUS EVERY 6 HOURS PRN
Status: DISCONTINUED | OUTPATIENT
Start: 2024-09-03 | End: 2024-09-03

## 2024-09-03 RX ORDER — POTASSIUM CHLORIDE, DEXTROSE MONOHYDRATE 150; 5 MG/100ML; G/100ML
30 INJECTION, SOLUTION INTRAVENOUS CONTINUOUS
Status: DISCONTINUED | OUTPATIENT
Start: 2024-09-03 | End: 2024-09-03

## 2024-09-03 RX ORDER — POTASSIUM CHLORIDE, DEXTROSE MONOHYDRATE 150; 5 MG/100ML; G/100ML
30 INJECTION, SOLUTION INTRAVENOUS CONTINUOUS
Status: DISCONTINUED | OUTPATIENT
Start: 2024-09-03 | End: 2024-09-05

## 2024-09-03 RX ADMIN — PROPOFOL 50 MCG/KG/MIN: 10 INJECTION, EMULSION INTRAVENOUS at 16:41

## 2024-09-03 RX ADMIN — INSULIN HUMAN 2 UNITS: 100 INJECTION, SOLUTION PARENTERAL at 12:14

## 2024-09-03 RX ADMIN — AMIODARONE HYDROCHLORIDE 150 MG: 50 INJECTION, SOLUTION INTRAVENOUS at 12:38

## 2024-09-03 RX ADMIN — FENTANYL CITRATE 250 MCG: 0.05 INJECTION, SOLUTION INTRAMUSCULAR; INTRAVENOUS at 13:10

## 2024-09-03 RX ADMIN — PROPOFOL 12.5 MCG/KG/MIN: 10 INJECTION, EMULSION INTRAVENOUS at 13:34

## 2024-09-03 RX ADMIN — EPINEPHRINE 10 MCG: 1 INJECTION, SOLUTION, CONCENTRATE INTRAVENOUS at 12:46

## 2024-09-03 RX ADMIN — SODIUM BICARBONATE 50 MEQ: 84 INJECTION INTRAVENOUS at 19:32

## 2024-09-03 RX ADMIN — AMINOCAPROIC ACID 10 G: 250 INJECTION, SOLUTION INTRAVENOUS at 07:38

## 2024-09-03 RX ADMIN — ESMOLOL HYDROCHLORIDE 20 MG: 100 INJECTION, SOLUTION INTRAVENOUS at 07:09

## 2024-09-03 RX ADMIN — SODIUM CHLORIDE 2 G: 900 INJECTION INTRAVENOUS at 11:18

## 2024-09-03 RX ADMIN — MUPIROCIN 1 APPLICATION: 20 OINTMENT TOPICAL at 06:19

## 2024-09-03 RX ADMIN — POTASSIUM CHLORIDE AND DEXTROSE MONOHYDRATE 30 ML/HR: 150; 5 INJECTION, SOLUTION INTRAVENOUS at 14:21

## 2024-09-03 RX ADMIN — FAMOTIDINE 20 MG: 20 TABLET, FILM COATED ORAL at 17:19

## 2024-09-03 RX ADMIN — SODIUM CHLORIDE 2000 MG: 900 INJECTION INTRAVENOUS at 22:08

## 2024-09-03 RX ADMIN — INSULIN HUMAN 3 UNITS: 100 INJECTION, SOLUTION PARENTERAL at 09:18

## 2024-09-03 RX ADMIN — ROCURONIUM BROMIDE 50 MG: 10 INJECTION INTRAVENOUS at 09:10

## 2024-09-03 RX ADMIN — EPINEPHRINE 0.06 MCG/KG/MIN: 1 INJECTION, SOLUTION, CONCENTRATE INTRAVENOUS at 12:40

## 2024-09-03 RX ADMIN — OXYCODONE HYDROCHLORIDE 10 MG: 10 TABLET ORAL at 22:08

## 2024-09-03 RX ADMIN — CEFAZOLIN 2 G: 1 INJECTION, POWDER, FOR SOLUTION INTRAMUSCULAR; INTRAVENOUS at 13:33

## 2024-09-03 RX ADMIN — INSULIN HUMAN 2 UNITS/HR: 1 INJECTION, SOLUTION INTRAVENOUS at 07:37

## 2024-09-03 RX ADMIN — ALBUMIN (HUMAN) 250 ML: 12.5 INJECTION, SOLUTION INTRAVENOUS at 15:07

## 2024-09-03 RX ADMIN — CHLORHEXIDINE GLUCONATE ORAL RINSE 15 ML: 1.2 SOLUTION DENTAL at 06:20

## 2024-09-03 RX ADMIN — ALBUMIN (HUMAN) 250 ML: 12.5 INJECTION, SOLUTION INTRAVENOUS at 14:45

## 2024-09-03 RX ADMIN — PROTAMINE SULFATE 450 MG: 10 INJECTION, SOLUTION INTRAVENOUS at 12:58

## 2024-09-03 RX ADMIN — ROCURONIUM BROMIDE 100 MG: 10 INJECTION INTRAVENOUS at 07:09

## 2024-09-03 RX ADMIN — ALBUMIN (HUMAN) 500 ML: 12.5 INJECTION, SOLUTION INTRAVENOUS at 17:18

## 2024-09-03 RX ADMIN — FENTANYL CITRATE 250 MCG: 0.05 INJECTION, SOLUTION INTRAMUSCULAR; INTRAVENOUS at 08:13

## 2024-09-03 RX ADMIN — SODIUM CHLORIDE, POTASSIUM CHLORIDE, SODIUM LACTATE AND CALCIUM CHLORIDE: 600; 310; 30; 20 INJECTION, SOLUTION INTRAVENOUS at 07:04

## 2024-09-03 RX ADMIN — ASPIRIN 325 MG: 325 TABLET ORAL at 15:08

## 2024-09-03 RX ADMIN — HYDROCODONE BITARTRATE AND ACETAMINOPHEN 1 TABLET: 7.5; 325 TABLET ORAL at 16:30

## 2024-09-03 RX ADMIN — NICARDIPINE HYDROCHLORIDE 0.1 MG: 25 INJECTION, SOLUTION INTRAVENOUS at 09:14

## 2024-09-03 RX ADMIN — GLYCOPYRROLATE 0.1 MG: 0.2 INJECTION INTRAMUSCULAR; INTRAVENOUS at 12:36

## 2024-09-03 RX ADMIN — SODIUM CHLORIDE: 9 INJECTION, SOLUTION INTRAVENOUS at 07:20

## 2024-09-03 RX ADMIN — GABAPENTIN 300 MG: 300 CAPSULE ORAL at 06:18

## 2024-09-03 RX ADMIN — SODIUM BICARBONATE 50 MEQ: 84 INJECTION INTRAVENOUS at 18:35

## 2024-09-03 RX ADMIN — LIDOCAINE HYDROCHLORIDE 80 MG: 20 INJECTION INTRAVENOUS at 07:09

## 2024-09-03 RX ADMIN — ALBUMIN (HUMAN) 250 ML: 12.5 INJECTION, SOLUTION INTRAVENOUS at 15:57

## 2024-09-03 RX ADMIN — FAMOTIDINE 20 MG: 20 TABLET, FILM COATED ORAL at 06:18

## 2024-09-03 RX ADMIN — NOREPINEPHRINE BITARTRATE 0.04 MCG/KG/MIN: 0.03 INJECTION, SOLUTION INTRAVENOUS at 12:40

## 2024-09-03 RX ADMIN — ACETAMINOPHEN 1000 MG: 10 INJECTION INTRAVENOUS at 20:22

## 2024-09-03 RX ADMIN — FENTANYL CITRATE 250 MCG: 0.05 INJECTION, SOLUTION INTRAMUSCULAR; INTRAVENOUS at 07:09

## 2024-09-03 RX ADMIN — EPHEDRINE SULFATE 5 MG: 50 INJECTION INTRAVENOUS at 07:51

## 2024-09-03 RX ADMIN — HEPARIN SODIUM 38000 UNITS: 1000 INJECTION INTRAVENOUS; SUBCUTANEOUS at 09:09

## 2024-09-03 RX ADMIN — OXYCODONE HYDROCHLORIDE 10 MG: 10 TABLET ORAL at 17:48

## 2024-09-03 RX ADMIN — MIDAZOLAM 2 MG: 1 INJECTION INTRAMUSCULAR; INTRAVENOUS at 09:10

## 2024-09-03 RX ADMIN — ETOMIDATE 30 MG: 40 INJECTION, SOLUTION INTRAVENOUS at 07:09

## 2024-09-03 RX ADMIN — ALBUMIN (HUMAN): 12.5 INJECTION, SOLUTION INTRAVENOUS at 14:03

## 2024-09-03 RX ADMIN — Medication 100 MCG: at 09:01

## 2024-09-03 RX ADMIN — EPHEDRINE SULFATE 10 MG: 50 INJECTION INTRAVENOUS at 12:33

## 2024-09-03 RX ADMIN — ROCURONIUM BROMIDE 30 MG: 10 INJECTION INTRAVENOUS at 12:36

## 2024-09-03 RX ADMIN — SODIUM CHLORIDE 2 G: 900 INJECTION INTRAVENOUS at 07:22

## 2024-09-03 RX ADMIN — AMINOCAPROIC ACID 10 G: 250 INJECTION, SOLUTION INTRAVENOUS at 13:11

## 2024-09-03 RX ADMIN — ALBUMIN (HUMAN) 250 ML: 12.5 INJECTION, SOLUTION INTRAVENOUS at 15:25

## 2024-09-03 RX ADMIN — CHLORHEXIDINE GLUCONATE, 0.12% ORAL RINSE 15 ML: 1.2 SOLUTION DENTAL at 20:22

## 2024-09-03 RX ADMIN — MIDAZOLAM 2 MG: 1 INJECTION INTRAMUSCULAR; INTRAVENOUS at 07:07

## 2024-09-03 RX ADMIN — MIDAZOLAM 1 MG: 1 INJECTION INTRAMUSCULAR; INTRAVENOUS at 13:10

## 2024-09-03 RX ADMIN — CALCIUM CHLORIDE 0.5 G: 100 INJECTION INTRAVENOUS; INTRAVENTRICULAR at 13:01

## 2024-09-03 RX ADMIN — MORPHINE SULFATE 2 MG: 2 INJECTION, SOLUTION INTRAMUSCULAR; INTRAVENOUS at 20:22

## 2024-09-03 RX ADMIN — ACETAMINOPHEN 1000 MG: 10 INJECTION, SOLUTION INTRAVENOUS at 13:25

## 2024-09-03 RX ADMIN — MORPHINE SULFATE 2 MG: 2 INJECTION, SOLUTION INTRAMUSCULAR; INTRAVENOUS at 18:41

## 2024-09-03 RX ADMIN — ATORVASTATIN CALCIUM 40 MG: 40 TABLET, FILM COATED ORAL at 20:22

## 2024-09-03 RX ADMIN — FENTANYL CITRATE 250 MCG: 0.05 INJECTION, SOLUTION INTRAMUSCULAR; INTRAVENOUS at 09:10

## 2024-09-03 NOTE — ANESTHESIA PROCEDURE NOTES
Intra-Op Anesthesia YOLA    Procedure Performed: Intra-Op Anesthesia YOLA       Start Time:        End Time:        General Procedure Information  Diagnostic Indications for Echo:  assessment of surgical repair and hemodynamic monitoring  Physician Requesting Echo: Frankie Michaud MD  Location performed:  OR  Intubated  Bite block placed  Heart visualized  Probe Insertion:  Easy  Probe Type:  Multiplane  Modalities:  2D only, color flow mapping, continuous wave Doppler and pulse wave Doppler    Echocardiographic and Doppler Measurements    Ventricles    Right Ventricle:  Cavity size normal.  Hypertrophy not present.  Thrombus not present.  Global function normal.    Left Ventricle:  Cavity size normal.  Thrombus not present.  Global Function mildly impaired.  Ejection Fraction 40%.          Valves    Aortic Valve:  Annulus calcified.  Stenosis moderate.  Area: 1.05 cm².  Mean Gradient: 17/10 mmHg.  Regurgitation mild.  Leaflets calcified.  Leaflet motions restricted.      Mitral Valve:  Annulus calcified.  Stenosis not present.  Regurgitation moderate.  Leaflets calcified and thickened.  Leaflet motions normal.      Tricuspid Valve:  Annulus normal.  Stenosis not present.  Regurgitation mild.  Leaflets normal.  Leaflet motions normal.    Pulmonic Valve:  Annulus normal.  Stenosis not present.  Regurgitation mild.        Aorta    Ascending Aorta:  Size normal.  Dissection not present.  Plaque thickness greater than 3 mm.  Mobile plaque not present.    Aortic Arch:  Size normal.  Dissection not present.  Plaque thickness less than 3 mm.  Mobile plaque not present.    Descending Aorta:  Size normal.  Dissection not present.  Plaque thickness less than 3 mm.  Mobile plaque not present.        Atria    Right Atrium:  Size normal.  Spontaneous echo contrast not present.  Thrombus not present.  Device not present.      Left Atrium:  Size normal.  Spontaneous echo contrast not present.  Thrombus not present.  Tumor not  present.  Device not present.    Left atrial appendage normal.      Septa        Ventricular Septum:  Intra-ventricular septum morphology normal.          Other Findings  Pericardium:  normal  Pleural Effusion:  none  Pulmonary Venous Flow:  blunted (decreased) systolic flow    Anesthesia Information  Performed Personally  Anesthesiologist:  Edwina Mcmahon MD      Echocardiogram Comments:       Findings discussed with Dr. Michaud    Prebypass:  RV fxn normal, LV fxn mildly-moderately reduced.EF 40-45%. Mild TR, Moderate central MR, MAC and thickened leaflets, mild AI, moderate AS, AV trileaflet, thickened valve, REYNOLD by planimetry 1.04cm2 REYNOLD by continuity 1.05cm2.  Aortic root noted to have calcification at STJ.    Post CABG x5  and AVR 23 mm bioprosthetic valve  RV fxn and LV fxn unchanged. Post bypass MR mild, trace AI around 6:00 position in SAX; appears to be within valve difficult to assess if paravalvular. Pk/mn pressure through AV 7/4mmHg.  REYNOLD by continuity 2.55cm2 Aorta intact

## 2024-09-03 NOTE — BRIEF OP NOTE
CORONARY ARTERY BYPASS GRAFTING  Progress Note    Hossein Gama  9/3/2024    Pre-op Diagnosis:   Coronary artery disease involving native coronary artery of native heart with angina pectoris [I25.119]       Post-Op Diagnosis Codes:     * Coronary artery disease involving native coronary artery of native heart with angina pectoris [I25.119]    Procedure/CPT® Codes:    Procedure(s):  MEDIAN STERNOTOMY, CORONARY ARTERY BYPASS GRAFTING X5 UTILIZING THE LEFT INTERNAL MAMMERY ARTERY, EVH OF THE GREATER RIGHT SAPHENOUS VEIN, AORTIC VALVE REPLACEMENT, EXPLORATION OF THE LEFT RADIAL ARTERY AND YOLA PER ANESTHESIA    Surgeon(s):  King George MD Chaney, John H, MD    Anesthesia: General    Staff:   Circulator: Meaghan Ross, FERNANDO; Humza Broderick RN; Tejal Dawson RN; Melanie Romeo RN  Perfusionist: Charlie Hatch  Scrub Person: Eligio Del Cid; Ryan Warner  Nursing Assistant: Odilia Stanton PCT; Melanie Gonzalez  Assistant: Kasey Bateman PA-C; Miguel Ko PA  Perfusion Tech: Kathy Bustillo  Assistant: Kasey Bateman PA-C; Miguel Ko PA      Estimated Blood Loss:  450 mL    Urine Voided: 1275 mL    Specimens:                          Drains:   Chest Tube 3 Anterior Mediastinal (Active)       NG/OG Tube Nasogastric 16 Fr (Active)       Urethral Catheter Silicone;Temperature probe 16 Fr. (Active)       Y Chest Tube 1 and 2 Right Pleural 2 Fr. Left Pleural (Active)       [REMOVED] Y Chest Tube 1, 2, and 3 1 Right Pleural 28 Fr. 2 Anterior Mediastinal 28 Fr. 3 Left Pleural 28 Fr. (Removed)       Findings: LIMA-->LAD, GSV-->PDA, OM2, OM3, D2  23 mm Inspiris tissue valve placed        Complications: None    Assistant: Kasey Bateman PA-C; Miguel Ko PA  was responsible for performing the following activities: Retraction, Suction, Closing, Placing Dressing, and Harvesting of Vessels and their skilled assistance was necessary for the success of this case.    Frankie Michaud MD     Date:  9/3/2024  Time: 14:37 EDT

## 2024-09-03 NOTE — H&P
Office Visit  8/6/2024  [facility] CARDIOTHORACIC SURGERY       Lala Ball APRN  Cardiothoracic Surgery Coronary artery disease involving native coronary artery of native heart with angina pectoris  Dx Coronary Artery Disease ; Referred by Amee Jacobs MD  Reason for Visit     Progress Notes  Lala Ball APRN (Nurse Practitioner)  Cardiothoracic Surgery  Expand All Collapse All[]Expand All by Default  08/06/2024  Patient Information  Hossein Gama                                                                                          [address]  [facility] [address]   1950  'PCP/Referring Physician'  Charlie Turner MD  [phone]  Amee Jacobs MD  [phone]       Chief Complaint   Patient presents with    Coronary Artery Disease       Referred by Dr. Guillen for CAD. Patient had chest pain last night that radiated into left shoulder and down left arm.          History of Present Illness:  Hossein Gama is a 73 y.o. gentleman referred to Dr. Michaud per Dr. Jacobs for CAD.  PMH: Hypertension, type 2 diabetes, dyslipidemia, mild aortic stenosis, and multivessel CAD with ejection fraction 30 to 35%.  Cardiac history includes remote PCI 1998 followed most recently by left heart cath at [place] July 2024: LAD diffusely diseased with 80% stenosis, mid LAD 70% tubular stenosis, OM2 occluded with bridging collaterals filling the mid and distal vessel, small to moderate OM 3 subtotally occluded with 99% stenosis and total occlusion of the RCA.  Patient had originally been referred for CABG at outside hospital but presented to [facility] ER 7/29/2024 at which time Dr. Jacobs performed cardiology consultation.  Outpatient consultation with Dr. Michaud was arranged.  Since the ER visit, patient has continued to experience intermittent chest pain.  Medical management includes aspirin, statin, losartan, Coreg, and Aldactone.  Dr. Jacobs arrange dobutamine stress echo  7/29/2024 which did confirm mild aortic stenosis with aortic mean gradient 14 mmHg at 20 mics per minute dobutamine.  History of chest wall radiation.  No history of lower extremity PVD procedures or vein stripping.  He did suffer trauma/open fracture left lower extremity years ago.        Problem List       Patient Active Problem List   Diagnosis    CAD (coronary artery disease)         Medical History        Past Medical History:   Diagnosis Date    CAD (coronary artery disease) 07/29/2024    Chest pain      CHF (congestive heart failure)      Deep vein thrombosis 1998     LEFT LEG    Diabetes      GERD (gastroesophageal reflux disease)      Heart failure 07/29/2024    Hyperlipidemia      Hypertension      Myocardial infarction      Pneumonia           Surgical History         Past Surgical History:   Procedure Laterality Date    CHOLECYSTECTOMY        CORONARY STENT PLACEMENT               Current Medications      Current Outpatient Medications:     aspirin 325 MG tablet, Take 1 tablet by mouth Daily., Disp: , Rfl:     atorvastatin (LIPITOR) 20 MG tablet, Take 1 tablet by mouth Daily., Disp: , Rfl:     carvedilol (COREG) 6.25 MG tablet, Take 1 tablet by mouth 2 (Two) Times a Day With Meals., Disp: , Rfl:     furosemide (LASIX) 20 MG tablet, Take 1 tablet by mouth Daily., Disp: 30 tablet, Rfl: 11    losartan (COZAAR) 25 MG tablet, Take 1 tablet by mouth Daily., Disp: , Rfl:     metFORMIN (GLUCOPHAGE) 500 MG tablet, Take 1 tablet by mouth 2 (Two) Times a Day With Meals., Disp: , Rfl:     nitroglycerin (NITROSTAT) 0.4 MG SL tablet, Place 1 tablet under the tongue Every 5 (Five) Minutes As Needed for Chest Pain. Take no more than 2 doses in 15 minutes., Disp: 100 tablet, Rfl: 0    potassium chloride 10 MEQ CR tablet, Take 1 tablet by mouth 2 (Two) Times a Day., Disp: 180 tablet, Rfl: 3    spironolactone (ALDACTONE) 25 MG tablet, Take 1 tablet by mouth Daily., Disp: , Rfl:      Allergies   No Known Allergies      Social History   Social History            Socioeconomic History    Marital status:     Number of children: 13   Tobacco Use    Smoking status: Former       Types: Pipe       Quit date:        Years since quittin.6       Passive exposure: Never    Smokeless tobacco: Never   Vaping Use    Vaping status: Never Used   Substance and Sexual Activity    Alcohol use: Never    Drug use: Never    Sexual activity: Defer               Family History   Problem Relation Age of Onset    No Known Problems Mother      Cancer Father        Review of Systems   Constitutional: Positive for malaise/fatigue. Negative for chills, decreased appetite, diaphoresis, fever, night sweats, weight gain and weight loss.   HENT:  Negative for hoarse voice.    Eyes:  Negative for blurred vision, double vision and visual disturbance.   Cardiovascular:  Positive for chest pain, dyspnea on exertion and leg swelling. Negative for claudication, irregular heartbeat, near-syncope, orthopnea, palpitations, paroxysmal nocturnal dyspnea and syncope.   Respiratory:  Positive for shortness of breath. Negative for cough, hemoptysis, sputum production and wheezing.    Hematologic/Lymphatic: Negative for adenopathy and bleeding problem. Does not bruise/bleed easily.   Skin:  Negative for color change, nail changes, poor wound healing and rash.   Musculoskeletal:  Negative for back pain, falls and muscle cramps.   Gastrointestinal:  Negative for abdominal pain, dysphagia and heartburn.   Genitourinary:  Negative for flank pain.   Neurological:  Positive for dizziness, light-headedness and numbness (feet). Negative for brief paralysis, disturbances in coordination, focal weakness, headaches, loss of balance, paresthesias, sensory change, vertigo and weakness.   Psychiatric/Behavioral:  Negative for depression and suicidal ideas. The patient is not nervous/anxious.    Allergic/Immunologic: Negative for persistent infections.      Vitals       "  Vitals:     08/06/24 1126 08/06/24 1127   BP: 118/58 100/58   BP Location: Right arm Left arm   Patient Position: Sitting Sitting   Pulse: 67     Temp: 98.8 °F (37.1 °C)     SpO2: 98%     Weight: 92.9 kg (204 lb 12.8 oz)     Height: 172.7 cm (68\")           Physical Exam  Vitals reviewed.   Constitutional:       General: He is not in acute distress.     Appearance: He is not toxic-appearing.   HENT:      Head: Normocephalic and atraumatic.   Eyes:      General: Lids are normal.      Conjunctiva/sclera: Conjunctivae normal.      Pupils: Pupils are equal, round, and reactive to light.   Neck:      Vascular: No carotid bruit or JVD.   Cardiovascular:      Rate and Rhythm: Normal rate and regular rhythm.      Pulses:           Dorsalis pedis pulses are 1+ on the right side and 1+ on the left side.        Posterior tibial pulses are 1+ on the right side and 1+ on the left side.      Heart sounds: S1 normal and S2 normal. Murmur heard.      Systolic murmur is present with a grade of 2/6.   Pulmonary:      Effort: Pulmonary effort is normal. No respiratory distress.      Breath sounds: Normal breath sounds.   Musculoskeletal:         General: Normal range of motion.      Cervical back: Normal range of motion and neck supple.      Right lower leg: No edema.      Left lower leg: No edema.   Lymphadenopathy:      Cervical: No cervical adenopathy.   Skin:     General: Skin is warm and dry.      Capillary Refill: Capillary refill takes less than 2 seconds.   Neurological:      General: No focal deficit present.      Mental Status: He is alert and oriented to person, place, and time.      GCS: GCS eye subscore is 4. GCS verbal subscore is 5. GCS motor subscore is 6.   Psychiatric:         Attention and Perception: Attention normal.         Mood and Affect: Mood normal.         Speech: Speech normal.         Behavior: Behavior is cooperative.         Thought Content: Thought content normal.         Judgment: Judgment normal.    "   The ROS, past medical history, surgical history, family history, social history, and vitals were reviewed by myself and corrected as needed.       Labs/Imaging:  Dobutamine Stress Echo 8/1/2024 Interpretation Summary     Patient did not endorse chest pain or any other symptoms during stress test.    SR with rare PVCs and PJCs throughout study    T wave inversion in inferolateral leads at baseline and throughout study. No significant ST changes noted.    Unable to assess for myocardial ischemia.    Patient gradient across the mitral valve shows mild aortic stenosis with dobutamine stress echo.    Left ventricular ejection fraction appears to be 36 - 40%.    Left ventricular diastolic function is consistent with (grade I) impaired relaxation.    Mild aortic valve stenosis is present.     Cardiac Cath 7/16/2024 @ [facility]/Dr. Fields (Personally reviewed)  Coronary Angiography:  Left main coronary artery  Free of disease  Left anterior descending coronary artery:  Proximal segment is calcific.  Proximal LAD diffusely diseased with 80% stenosis.  Small to moderate D1 arising from the proximal LAD is free of disease.  Mid LAD has 70% tubular stenosis.  Rest of LAD and moderate D2 are free of disease.  Circumflex coronary artery:  Nondominant.  Small OM1 arising from the proximal segment is free of disease.  Moderate OM 2 totally occluded with bridging collaterals filling the mid and distal vessel, small to moderate OM 3 subtotally occluded, 99% stenosis.  OM 2 and OM 3 arise from mid circumflex.  Circumflex bifurcates distally into moderate OM 4 and smaller true circumflex.  Collaterals noted to PL and PDA on injection of left coronary system  Right coronary artery: Total occlusion mid segment  Left ventriculography:  Very limited study.  Catheter just past LVOT, inadequate evaluation of LV function, LV catheterization done mostly for gradient across aortic valve  Impression:  Severe three-vessel  CAD  Moderate LV dysfunction (echo EF 30 to 35%)  Peak gradient across aortic valve 15 mmHg, low gradient low flow AS not excluded  Biventricular CHF  Recommendations:  Evaluate for CABG.  Will need dobutamine echo to evaluate for low-flow low gradient aortic stenosis, will defer to CT surgery  Tray Fields MD 7/16/2024                            Radial Artery Checklist:        Vicente Test:         Upper Extremity Arterial Duplex:  No          Hand Dominance:  Right       History Of Major Arm Trauma:  No       History Of Upper Extremity Surgery:  No       Raynaud's Syndrome:  No       Scleroderma:  No       Advanced Stage Chronic Kidney Disease:  No       Angiography Via Radial Artery Access:  No       Carpel Tunnel:  No       Lymphedema:  No       Occupation:  farming/construction       ------------------------------------------------------------------------------------------               Assessment/Plan:    1. Coronary artery disease involving native coronary artery of native heart with angina pectoris   - Hossein Gama is a 73 y.o. gentleman referred to Dr. Michaud per Dr. Jacobs for CAD.    - PMH: Hypertension, type 2 diabetes, dyslipidemia, mild aortic stenosis,  multivessel CAD with remote PCI 1998, and ischemic cardiomyopathy w/ ejection fraction 30 - 35%.    - Left heart cath at [place] July 2024: LAD diffusely diseased with 80% stenosis, mid LAD 70% tubular stenosis, OM2 occluded with bridging collaterals filling the mid and distal vessel, small to moderate OM 3 subtotally occluded with 99% stenosis and total occlusion of the RCA.    - Originally been referred for CABG at outside hospital but presented to [facility] ER 7/29/2024 at which time Dr. Jacobs performed cardiology consultation.   - Since the ER visit, patient has continued to experience intermittent chest pain.    - Medical management includes aspirin, statin, losartan, Coreg, and Aldactone.    - Dobutamine stress echo 7/29/2024: mild  aortic stenosis with aortic mean gradient 14 mmHg at 20 mics per minute dobutamine.   - No history of chest wall radiation.  No history of lower extremity PVD procedures or vein stripping.  He did suffer trauma/open fracture left lower extremity years ago.  - Bilateral Vicente testing is positive  - Discussed coronary artery bypass grafting with EVH/RAH in detail including death, MI, CVA, renal dysfunction, blood clots, bleeding, respiratory compromise, and infection.  Discussed post op expectations as well.  Mr. Gama verbalized understanding of the procedure and risks and he wishes to proceed.  - Carotid duplex pending  - Follow up in clinic 6 weeks post op CABG/EVH/YOLA/RAH per Dr. Michaud.           Patient Education: see surgical risk discussion above        Follow Up:   Return in about 8 weeks (around 10/1/2024) for post op CABG/EVH/YOLA/RAH.   Or sooner for any further concerns or worsening sign and symptoms. If unable to reach us in the office please dial 911 or go to the nearest emergency department.        KAILEY Winchester  [facility] Cardiothoracic Surgery     Time Spent: I spent 45 minutes caring for Hossein on this date of service. This time includes time spent by me in the following activities: preparing for the visit, reviewing tests, obtaining and/or reviewing a separately obtained history, performing a medically appropriate examination and/or evaluation, counseling and educating the patient/family/caregiver, ordering medications, tests, or procedures, referring and communicating with other health care professionals, documenting information in the medical record, independently interpreting results and communicating that information with the patient/family/caregiver, and care coordination.                              Instructions      Return in about 8 weeks (around 10/1/2024) for post op CABG/EVH/YLOA/RAH.  Patient declined After Visit Summary  Additional Documentation    Vitals: /58 (BP  "Location: Left arm, Patient Position: Sitting)     Pulse 67     Temp 98.8 °F (37.1 °C)     Ht 172.7 cm (68\")      Wt 92.9 kg (204 lb 12.8 oz)     SpO2 98%     BMI 31.14 kg/m²     BSA 2.06 m²          More Vitals   Flowsheets: Outbreak Screen   Encounter Info: Billing Info,     History,     Allergies,     Detailed Report,     ...(8 more)     Communications    View All Conversations on this Encounter    Hinduism Preferred Visit Summary sent to Amee Jacobs MD       Hinduism Preferred Visit Summary sent to Charlie Turner MD  Media  From this encounter  Scan on 8/9/2024 0733 by Norma Day: ENC CK 08/06/24     Patient declined AVS at 8/6/2024  1:55 PM    Encounter Information    Encounter Information   Provider Department Encounter #   8/6/2024 11:30 AM Lala Ball APRN [facility] [id number]     Primary Coverage    Payer Plan Sponsor Code Group Number Group Name   No coverage found         Orders Placed    None  Medication Changes      None  Medication List  Visit Diagnoses      Coronary artery disease involving native coronary artery of native heart with angina pectoris  Problem List   Current Medications     Disp Start End   aspirin 325 MG tablet --  --   Sig - Route: Take 1 tablet by mouth Daily. - Oral   Class: Historical Med   atorvastatin (LIPITOR) 20 MG tablet --  --   Sig - Route: Take 1 tablet by mouth Daily. - Oral   Class: Historical Med   carvedilol (COREG) 6.25 MG tablet --  --   Sig - Route: Take 1 tablet by mouth 2 (Two) Times a Day With Meals. - Oral   Class: Historical Med   furosemide (LASIX) 20 MG tablet 30 tablet 8/1/2024 --   Sig - Route: Take 1 tablet by mouth Daily. - Oral   losartan (COZAAR) 25 MG tablet --  --   Sig - Route: Take 1 tablet by mouth Daily. - Oral   Class: Historical Med   metFORMIN (GLUCOPHAGE) 500 MG tablet --  --   Sig - Route: Take 1 tablet by mouth 2 (Two) Times a Day With Meals. - Oral   Class: Historical Med   nitroglycerin (NITROSTAT) 0.4 MG SL " tablet 100 tablet 7/29/2024 --   Sig - Route: Place 1 tablet under the tongue Every 5 (Five) Minutes As Needed for Chest Pain. Take no more than 2 doses in 15 minutes. - Sublingual   potassium chloride 10 MEQ CR tablet 180 tablet 8/1/2024 --   Sig - Route: Take 1 tablet by mouth 2 (Two) Times a Day. - Oral   spironolactone (ALDACTONE) 25 MG tablet --  --   Sig - Route: Take 1 tablet by mouth Daily. - Oral   Class: [facility] Medications     Dose Frequency Start End   ceFAZolin 2000 mg IVPB in 100 mL NS (MBP) 2 g Once 9/3/2024 --   Admin Instructions: Administer within 1 hour of surgical incision. Redose 4 hours from pre-op dose if procedure ongoing or >1.5 L blood loss.  Caution: Look alike/sound alike drug alert   Route: Intravenous   chlorhexidine (PERIDEX) 0.12 % solution 15 mL (Completed) 15 mL Once 9/3/2024 9/3/2024   Admin Instructions: Swish and Spit 1 hour prior to surgery.   Do not swallow.   Route: Mouth/Throat   Chlorhexidine Gluconate Cloth 2 % pads  Every 12 Hours PRN 9/3/2024 --   Route: Topical   EPINEPHrine 10 mg in 250 mL infusion 0.02-0.3 mcg/kg/min × 93.6 kg Titrated 9/3/2024 --   Admin Instructions: Initiate infusion at 0.02 mcg/kg/min and titrate up or down by 0.02-0.06 mcg/kg/min every 5-10 minutes to use the lowest dose possible to maintain a MAP greater than or equal to 65 mm Hg. Contact provider if unable to maintain MAP target at the prescribed dose or if MAP is greater than 95 mm Hg. Once MAP target achieved, obtain vitals a minimum of every 30 minutes. Central line preferred, if unable, use large bore IV access with frequent nurse monitoring. Must contact provider and obtain new order to exceed dose of 0.3 mcg/kg/min.  Caution: Look alike/sound alike drug alert.   Protect from light.   Central line preferred, if unavailable use large bore IV access with frequent nurse monitoring of IV site.   Route: Intravenous   famotidine (PEPCID) tablet 20 mg (Completed) 20 mg Once  9/3/2024 9/3/2024   Route: Oral   gabapentin (NEURONTIN) capsule 300 mg (Completed) 300 mg Once 9/3/2024 9/3/2024   Admin Instructions:    Route: Oral   insulin regular 1 unit/mL in 0.9% sodium chloride (Glucommander) 0-100 Units/hr Titrated 9/3/2024 --   Admin Instructions: Start if BG greater that 150 mg/dL. Initiate insulin drip at rate determined by the Glucommander.   Notes to Pharmacy: Upon initiation of Glucommander insulin drip, make sure all other insulin orders and anti-diabetic medications have been discontinued.   Route: Intravenous   lactated ringers infusion 9 mL/hr Continuous 9/3/2024 --   Admin Instructions: May switch to NS IV at KVO if renal / if indicated   Route: Intravenous   lidocaine PF 1% (XYLOCAINE) injection 0.5 mL 0.5 mL Once As Needed 9/3/2024 --   Route: Injection   midazolam (VERSED) injection 0.5 mg 0.5 mg Every 10 Minutes PRN 9/3/2024 --   Admin Instructions: May repeat dose in 10 minutes one time then contact provider for additional orders.     Route: Intravenous   mupirocin (BACTROBAN) 2 % nasal ointment 1 Application 1 Application Every 12 Hours 9/3/2024 9/4/2024   Admin Instructions: Instill into nares the night before and AM of surgery as directed.     Route: Each Nare   mupirocin (BACTROBAN) 2 % nasal ointment 1 Application 1 Application Every 12 Hours 9/3/2024 9/4/2024   Admin Instructions:    Route: Each Nare   nitroglycerin (NITROSTAT) SL tablet 0.4 mg 0.4 mg Every 5 Minutes PRN 9/3/2024 --   Admin Instructions: If pain unrelieved order stat EKG and notify Surgeon and Cardiologist  May administer up to 3 doses per episode.   Route: Sublingual   sodium chloride 0.9 % flush 10 mL 10 mL Every 12 Hours Scheduled 9/3/2024 --   Route: Intravenous   sodium chloride 0.9 % flush 10 mL 10 mL As Needed 9/3/2024 --   Route: Intravenous   sodium chloride 0.9 % infusion 40 mL 40 mL As Needed 9/3/2024 --   Admin Instructions: Following administration of an IV intermittent medication, flush  line with 40mL NS at 100mL/hr.   Route: Intravenous   [facility] Pre-op    Full history and physical note from office is up to date.     /88 (BP Location: Left arm, Patient Position: Lying)   Pulse 75   Temp 97.9 °F (36.6 °C) (Temporal)   Resp 18   SpO2 98%   Patient denies allergy to contrast dye or latex  IMM:  Influenza: No  Pneumococcal: No  Tetanus: Up-to-date  Lungs: Clear to auscultation bilaterally bases  Cardiovascular: S1-S2 without rubs murmurs or gallops  Abdomen: Soft, nontender, bowel sounds present throughout.    LAB Results:  Lab Results   Component Value Date    WBC 6.96 08/23/2024    HGB 15.8 08/23/2024    HCT 47.7 08/23/2024    MCV 87.5 08/23/2024     08/23/2024    NEUTROABS 4.31 08/23/2024    GLUCOSE 185 (H) 08/23/2024    BUN 13 08/23/2024    CREATININE 0.93 08/23/2024     08/23/2024    K 4.6 08/23/2024     08/23/2024    CO2 25.0 08/23/2024    MG 1.9 08/23/2024    CALCIUM 9.4 08/23/2024    ALBUMIN 4.2 08/23/2024    AST 20 08/23/2024    ALT 14 08/23/2024    BILITOT 0.8 08/23/2024    PTT 30.3 08/23/2024    INR 1.19 (H) 08/23/2024       Cancer Staging (if applicable)  Cancer Patient: __ yes __no __unknown__N/A; If yes, clinical stage T:__ N:__M:__, stage group or __N/A  Impression: Angina pectoris  Multivessel coronary artery disease  Diabetes mellitus  Hyperlipidemia  Hypertension  Plan: Coronary artery bypass grafting, endoscopic vein harvesting, radial artery harvest.  YOSSI Vazquez   09/03/24   6:35 AM EDT

## 2024-09-03 NOTE — ANESTHESIA POSTPROCEDURE EVALUATION
Patient: Hossein Gama    Procedure Summary       Date: 09/03/24 Room / Location:  [facility] OR [address] [place] [address] [facility]    Anesthesia Start: 0702 Anesthesia Stop: 1423    Procedure: MEDIAN STERNOTOMY, CORONARY ARTERY BYPASS GRAFTING X5 UTILIZING THE LEFT INTERNAL MAMMERY ARTERY, EVH OF THE GREATER RIGHT SAPHENOUS VEIN, AORTIC VALVE REPLACEMENT, EXPLORATION OF THE LEFT RADIAL ARTERY AND YOLA PER ANESTHESIA (Chest) Diagnosis:       Coronary artery disease involving native coronary artery of native heart with angina pectoris      (Coronary artery disease involving native coronary artery of native heart with angina pectoris [I25.119])    Surgeons: Frankie Michaud MD Provider: Edwina Mcmahon MD    Anesthesia Type: general, Inocencia, CVL ASA Status: 4            Anesthesia Type: general, Inocencia, CVL    Vitals  Vitals Value Taken Time   BP 94/57 09/03/24 1424   Temp     Pulse 66 09/03/24 1428   Resp     SpO2 97 % 09/03/24 1428   Vitals shown include unfiled device data.        Post Anesthesia Care and Evaluation    Patient location during evaluation: ICU  Patient participation: complete - patient cannot participate (sedated)  Post-procedure mental status: intubated and sedated.  Pain score: 0  Pain management: adequate    Airway patency: patent  Anesthetic complications: No anesthetic complications  PONV Status: none  Cardiovascular status: hemodynamically stable and acceptable  Respiratory status: acceptable, ETT, intubated and ventilator  Hydration status: acceptable    Comments: No apparent anesthesia complications noted

## 2024-09-03 NOTE — PROGRESS NOTES
Critical Care Note     LOS: 0 days   Patient Care Team:  Charlie Turner MD as PCP - General (Family Medicine)  Amee Jacobs MD as Cardiologist (Cardiology)    Chief Complaint/Reason for visit:      CABGx5 9/3/24        Ben Gama is a 74 y.o. male with PMH significant for HTN, non-insulin dependent type 2 diabetes, dyslipidemia, mild aortic stenosis, multivessel CAD with remote PCI (1998), and CHF (EF 30-35%).   He presents for post-operative management status post CABG, performed by Dr. Michaud on 9/3/2024.   Patient had been having chest pain and dyspnea on exertion over the past couple months. This led to LHC at outside facility ([place], [place]) in July, 2024. It demonstrated diffusely diseased LAD with 80% stenosis, mid LAD 70% tubular stenosis, OM2 occluded with bridging collaterals filling the mid and distal vessel, small to moderate OM 3 subtotally occluded with 99% stenosis and total occlusion of the RCA. He was subsequently referred for CABG evaluation at outside facility.   However, prior to this he visited [facility] ED on 7/29/2024 following an episode of pre-syncope that had occurred the day prior. He was later consulted by Dr. Jacobs of cardiology on 8/1/2024. Outpatient consultation with Dr. Michaud was arranged and patient underwent workup for CABG.   PFT, performed 8/23/24, indicated possible restrictive disease with FVC 2.66 L (66% predicted). Best FEV1 was 2.35 (80% predicted). Best FEV1/BFVC (88.44).   Duplex of bilateral carotid arteries (8/23/2024) demonstrated less than 50% stenosis bilaterally.        History taken from: Chart   Time Spent: 15 minutes   Electronically signed by Sarbjit Chung PA-C, 09/03/24, 7:38 AM EDT.      Interval History:     Patient underwent CABG x 5, LIMA to LAD, saphenous vein to PDA, OM 2, OM 3, D2, aortic valve replacement, tissue, today.  He did not require any blood products.  On arrival to the ICU his systolic blood pressure was in the 80s.   Blood pressure improved without Rodger.  He is also on norepinephrine 0.04 mics per kilogram per minute.  He is sedated on propofol.  Current rhythm is sinus rhythm at 65.  He did require shock x 5 coming off pump for ventricular arrhythmias.  Currently his cardiac output 4.2, cardiac index 2.  Revealed an EF 40%, preoperatively.  Postoperatively valve was well-seated and valve area had improved to 2.55 cm² compared to 1.04 cm².      Review of Systems:    All systems were reviewed and negative except as noted in subjective.    Medical history, surgical history, social history, family history reviewed    Objective     Intake/Output:    Intake/Output Summary (Last 24 hours) at 9/3/2024 1501  Last data filed at 9/3/2024 1421  Gross per 24 hour   Intake 1700 ml   Output 1600 ml   Net 100 ml       Nutrition:  NPO Diet NPO Type: Strict NPO    Infusions:  dexmedetomidine, 0.2-1.5 mcg/kg/hr  dextrose 5 % with KCl 20 mEq, 30 mL/hr  DOBUTamine, 2-20 mcg/kg/min  DOPamine, 2-20 mcg/kg/min  EPINEPHrine, 0.02-0.3 mcg/kg/min  insulin, 0-100 Units/hr  lactated ringers, 9 mL/hr  niCARdipine, 5-15 mg/hr  nitroglycerin, 5-200 mcg/min  norepinephrine, 0.02-0.3 mcg/kg/min  phenylephrine, 0.5-3 mcg/kg/min  propofol, 5-50 mcg/kg/min        Mechanical Ventilator Settings:  Rate 14 tidal volume 690 PEEP 5 pressure support 10 FiO2 100%  ABG pH 7.38, CO2 34, O2 160                           Resp Rate (Observed) Vent: 14                  Telemetry: Sinus rhythm             Vital Signs  Blood pressure 96/59, pulse 66, temperature 97.7 °F (36.5 °C), temperature source Bladder, resp. rate 14, SpO2 98%.    Physical Exam:  General Appearance:  Older gentleman, sedated   Head:  Atraumatic   Eyes:          Conjunctiva pink   Ears:     Throat: Orally intubated   Neck: Right IJ line   Back:      Lungs:   Sternotomy incision intact.  Breath sounds are bilateral without wheeze or rhonchi    Heart:  Heart sounds are distant, regular, S1, S2 auscultated, no  "appreciable rub   Abdomen:   Bowel sounds present but hypoactive, nondistended, soft right lower extremity Ace wrap, toes with good capillary refill,   Rectal:   Deferred   Extremities: Extremities are cool to palpation   Pulses:    Skin: Cool and dry   Lymph nodes:    Neurologic: Sedated      Results Review:     I reviewed the patient's new clinical results.   Results from last 7 days   Lab Units 09/03/24  0638   POTASSIUM mmol/L 4.4     Results from last 7 days   Lab Units 09/03/24  1432   WBC 10*3/mm3 16.36*   HEMOGLOBIN g/dL 13.4   HEMATOCRIT % 40.2   PLATELETS 10*3/mm3 109*     Results from last 7 days   Lab Units 09/03/24  1458   PH, ARTERIAL pH units 7.384   PO2 ART mm Hg 160.0*   PCO2, ARTERIAL mm Hg 34.4*   HCO3 ART mmol/L 20.5     No results found for: \"BLOODCX\"  No results found for: \"URINECX\"    I reviewed the patient's new imaging including images and reports.    Chest x-ray endotracheal tube 2 cm above the jaydon, right IJ line in good position, no pneumothorax, no edema or infiltrate, minimal blunting of the left costophrenic angle    All medications reviewed.   acetaminophen, 1,000 mg, Intravenous, Once  [START ON 9/4/2024] acetaminophen, 650 mg, Oral, Q8H  albumin human, , ,   [START ON 9/4/2024] aspirin, 325 mg, Oral, Daily  aspirin, 325 mg, Oral, Once  atorvastatin, 40 mg, Oral, Nightly  ceFAZolin, 2,000 mg, Intravenous, Q8H  chlorhexidine, 15 mL, Mouth/Throat, Q12H  [START ON 9/4/2024] metoprolol tartrate, 12.5 mg, Oral, Q12H  metoprolol tartrate, 2.5 mg, Intravenous, Q6H  pharmacy consult - MTM, , Does not apply, Daily  protamine, 50 mg, Intravenous, Once  senna-docusate sodium, 2 tablet, Oral, BID          Assessment & Plan       CAD (coronary artery disease)    Benign essential HTN    Congestive heart failure with left ventricular dysfunction    Type 2 diabetes mellitus    Aortic stenosis    Dyslipidemia    74-year-old gentleman, with hypertension, diabetes, multivessel coronary disease who " underwent CABG x 5, aortic valve replacement today.   He did not require blood products.  He is currently in sinus rhythm.  He is on some low-dose norepinephrine to support his blood pressure.  Chest tube output 160 mL.  Postoperative hemoglobin is 13.4.  Blood pressure has also been responsive to volume resuscitation.  Initial blood glucose is 94.  His hemoglobin A1c is 7.5.  Initial blood pressure was in the 80s and he is on some low-dose norepinephrine and receiving albumin for volume expansion with significant benefit.  He has minimal output from his mediastinal drains.  He does not have underlying lung disease.  Initially he was on a PEEP of 5 and 100% FiO2.        PLAN:    Wean FiO2  Volume expansion  Wean norepinephrine as able  Aspirin, statin, beta-blocker    Mechanical ventilatory wean per protocol  Albuterol/ipratropium bromide nebulizer 4x daily, as needed    Monitor MT/pleural output, urine output  Replace electrolytes as needed  Monitor for any arrhythmias  Start Pepcid    VTE Prophylaxis: foot pumps    Stress Ulcer Prophylaxis: none    Ericka Craig MD  09/03/24  15:01 EDT      Time: Critical care 35 min  I personally provided care to this critically ill patient as documented above.  Critical care time does not include time spent on separately billed procedures.  None of my critical care time was concurrent with other critical care providers.

## 2024-09-03 NOTE — CONSULTS
[facility] Cardiology, Inpatient Consult  Date of Hospital Visit: 24  Encounter Provider: Meaghan Funez PA-C    Place of Service: [facility]  Patient Name: Hossein Gama  :1950  MRN: [id number]     Primary Care Provider: Charlie Turner MD    Chief complaint: Follow-up hypertension, hyperlipidemia.    PROBLEM LIST  CARDIAC  Coronary Artery Disease:  Remote stents in    Stress echo 2020: apical and septal and mid anteroseptal hypokinesis   [facility] 2024: Severe multivessel disease      Myocardium:   Echo 2020, [place]: EF 55-60%  EF 2024:30%-35%     Valvular:   Mild AS with gradient of 15mmHg across aortic valve    Electrical:   Bifascicular block postop    Pericardium:   Normal     CARDIAC RISK FACTORS  Hypertension  Diabetes  Dyslipidemia    NON-CARDIAC  None    SURGERIES  CABG    History of Present Illness:  Patient is a 74-year-old with multivessel disease and moderate aortic stenosis presented today as an outpatient for CABG and AVR.  We have been consulted to manage patient's hypertension and hyperlipidemia postop.  Patient overall did well, he did not require 5 shocks after being taken off of bypass.  He is currently on Levophed for pressure support and has not received any blood products at this time.      I reviewed patient's past medical history, surgical history, family history and social history.    Current Outpatient Medications   Medication Instructions    aspirin 325 mg, Oral, Daily    atorvastatin (LIPITOR) 20 mg, Oral, Daily    carvedilol (COREG) 6.25 mg, Oral, 2 Times Daily With Meals    furosemide (LASIX) 20 mg, Oral, Daily    losartan (COZAAR) 25 mg, Oral, Daily    metFORMIN (GLUCOPHAGE) 500 mg, Oral, 2 Times Daily With Meals    nitroglycerin (NITROSTAT) 0.4 mg, Sublingual, Every 5 Minutes PRN, Take no more than 2 doses in 15 minutes.    potassium chloride 10 MEQ CR tablet 10 mEq, Oral, 2 Times Daily    spironolactone  (ALDACTONE) 25 mg, Oral, Daily          Scheduled Meds:acetaminophen, 1,000 mg, Intravenous, Once  [START ON 9/4/2024] acetaminophen, 650 mg, Oral, Q8H  [START ON 9/4/2024] aspirin, 325 mg, Oral, Daily  atorvastatin, 40 mg, Oral, Nightly  ceFAZolin, 2,000 mg, Intravenous, Q8H  chlorhexidine, 15 mL, Mouth/Throat, Q12H  famotidine, 20 mg, Oral, BID AC  [START ON 9/4/2024] metoprolol tartrate, 12.5 mg, Oral, Q12H  metoprolol tartrate, 2.5 mg, Intravenous, Q6H  pharmacy consult - MTM, , Does not apply, Daily  protamine, 50 mg, Intravenous, Once  senna-docusate sodium, 2 tablet, Oral, BID      Continuous Infusions:dexmedetomidine, 0.2-1.5 mcg/kg/hr  dextrose 5 % with KCl 20 mEq, 30 mL/hr, Last Rate: 30 mL/hr (09/03/24 1421)  DOBUTamine, 2-20 mcg/kg/min  DOPamine, 2-20 mcg/kg/min  EPINEPHrine, 0.02-0.3 mcg/kg/min  insulin, 0-100 Units/hr, Last Rate: 0.9 Units/hr (09/03/24 1455)  lactated ringers, 9 mL/hr  niCARdipine, 5-15 mg/hr  nitroglycerin, 5-200 mcg/min  norepinephrine, 0.02-0.3 mcg/kg/min, Last Rate: 0.02 mcg/kg/min (09/03/24 1440)  phenylephrine, 0.5-3 mcg/kg/min  propofol, 5-50 mcg/kg/min, Last Rate: 50 mcg/kg/min (09/03/24 1421)          Review of Systems   Unable to perform ROS: Intubated               Objective:     Vitals:    09/03/24 1500 09/03/24 1501 09/03/24 1505 09/03/24 1510   BP: (!) 77/49 (!) 77/49     BP Location:       Patient Position:       Pulse: 68 68 69 66   Resp:       Temp:       TempSrc:       SpO2: 100% 100% 99% 100%       There is no height or weight on file to calculate BMI.      Intake/Output Summary (Last 24 hours) at 9/3/2024 1533  Last data filed at 9/3/2024 1421  Gross per 24 hour   Intake 1700 ml   Output 1600 ml   Net 100 ml       Constitutional:       Comments: Intubated and sedated   HENT:         Comments: NG tube in place  Cardiovascular:      Normal rate. Regular rhythm.      Biphasic rub.      Comments: Palpable ulnar pulse to right wrist  Edema:     Peripheral edema absent.    Abdominal:      General: There is no distension.   Neurological:      Comments: Sedated           Lab Review:                CBC:      Lab 09/03/24  1432   WBC 16.36*   HEMOGLOBIN 13.4   HEMATOCRIT 40.2   PLATELETS 109*   MCV 87.6     CMP:        Lab 09/03/24  1432 09/03/24  0638   SODIUM 142  --    POTASSIUM 4.0 4.4   CHLORIDE 112*  --    CO2 20.0*  --    ANION GAP 10.0  --    BUN 11  --    CREATININE 0.88  --    EGFR 91.4  --    GLUCOSE 94  --    CALCIUM 8.9  --    IONIZED CALCIUM 1.31  --    MAGNESIUM 2.4  --    PHOSPHORUS 2.7  --    ALBUMIN 3.4*  --      Results from last 7 days   Lab Units 09/03/24  1432   MAGNESIUM mg/dL 2.4     Lab Results   Component Value Date    HGBA1C 7.50 (H) 08/23/2024     Estimated Creatinine Clearance: 84.2 mL/min (by C-G formula based on SCr of 0.88 mg/dL).          Lab 09/03/24  1432 09/03/24  0638   PROTIME 21.7* 14.9*   INR 1.88* 1.16*       XR Chest 1 View    Result Date: 9/3/2024  Impression: Endotracheal tube projects in good position above the jaydon. Mediastinal drain noted in place. Pulmonary artery catheter floated in the SVC. Nasogastric tube terminates in the stomach. Sternotomy wires appear intact. Some minimal scattered atelectasis is present as well as central vascular congestion. There is no distinct pneumothorax or significant pleural effusion. Sternotomy wires appear intact. Electronically Signed: Donnie Busch MD  9/3/2024 2:49 PM EDT  Workstation ID: [id number]      Results for orders placed during the hospital encounter of 07/29/24    Adult Stress Echo to Assess Aortic Valve Gradients w/Cont or Stress Agent if Necessary Per Protocol    Interpretation Summary    Patient did not endorse chest pain or any other symptoms during stress test.    SR with rare PVCs and PJCs throughout study    T wave inversion in inferolateral leads at baseline and throughout study. No significant ST changes noted.    Unable to assess for myocardial ischemia.    Patient gradient across  the mitral valve shows mild aortic stenosis with dobutamine stress echo.    Left ventricular ejection fraction appears to be 36 - 40%.    Left ventricular diastolic function is consistent with (grade I) impaired relaxation.    Mild aortic valve stenosis is present.       EKG: Normal sinus rhythm, bifascicular block, heart rate 70 bpm    Result Review:  I have personally reviewed the results from the time of admission and agree with these findings:  [x]  Laboratory  []  Radiology  [x]  EKG/Telemetry   []  Pathology  []  Old records  []  Other:             Assessment:   CAD s/p CABG X5 (9/3/2024): LIMA to the LAD, SVG to PDA, SVG to OM 2, SVG to OM 3, SVG to D2  Preop EF 36-40%  Will need to have recheck limited echo prior to discharge to see if patient has had improvement of ejection fraction.  Moderate AS s/p 23 mm Inspiris tissue AVR (9/3/2024)  Hypertension  Hyperlipidemia  Bifascicular block postop      Plan:   Continue routine postop care per CT surgery.  Wean Levophed as tolerated.  Patient has new bifascicular block, metoprolol has been ordered but would be judicious giving this in the morning.  If patient develops A-fib with RVR can start patient on amiodarone per protocol.  Continue aspirin 325 and atorvastatin 40 mg nightly.  Will check a lipid panel in the morning.  Will continue to follow during this admission, Dr. Jacobs to see tomorrow.      Meaghan Funez PA-C

## 2024-09-03 NOTE — ANESTHESIA PROCEDURE NOTES
Airway  Date/Time: 9/3/2024 7:10 AM  Airway not difficult    General Information and Staff    Patient location during procedure: OR  Anesthesiologist: Edwina Mcmahon MD    Indications and Patient Condition  Indications for airway management: airway protection    Preoxygenated: yes  Mask difficulty assessment: 2 - vent by mask + OA or adjuvant +/- NMBA    Final Airway Details  Final airway type: endotracheal airway      Successful airway: ETT  Cuffed: yes   Successful intubation technique: direct laryngoscopy  Facilitating devices/methods: intubating stylet  Endotracheal tube insertion site: oral  Blade: Crystal  Blade size: 3  ETT size (mm): 7.5  Cormack-Lehane Classification: grade I - full view of glottis  Placement verified by: chest auscultation and capnometry   Cuff volume (mL): 8  Measured from: gums  ETT/EBT to gums (cm): 21  Number of attempts at approach: 1  Assessment: lips, teeth, and gum same as pre-op and atraumatic intubation

## 2024-09-03 NOTE — ANESTHESIA PROCEDURE NOTES
Arterial Line      Patient reassessed immediately prior to procedure    Patient location during procedure: pre-op  Start time: 9/3/2024 6:45 AM   Line placed for hemodynamic monitoring.  Performed By   Anesthesiologist: Edwina Mcmahon MD   Preanesthetic Checklist  Completed: patient identified, IV checked, site marked, risks and benefits discussed, surgical consent, monitors and equipment checked, pre-op evaluation and timeout performed  Arterial Line Prep    Sterile Tech: cap, gloves and sterile barriers  Prep: ChloraPrep  Patient monitoring: blood pressure monitoring, continuous pulse oximetry and EKG  Arterial Line Procedure   Laterality:right  Location:  radial artery  Catheter size: 20 G   Guidance: ultrasound guided and palpation technique  Number of attempts: 1  Successful placement: yes Images: still images not obtained  Post Assessment   Dressing Type: line sutured, occlusive dressing applied, secured with tape and wrist guard applied.   Complications no  Circ/Move/Sens Assessment: normal and unchanged.   Patient Tolerance: patient tolerated the procedure well with no apparent complications

## 2024-09-03 NOTE — STS RISK SCORE
Procedure Type: Isolated CABG  Perioperative Outcome Estimate %  Operative Mortality 2.18%  Morbidity & Mortality 9.18%  Stroke 0.892%  Renal Failure 1.35%  Reoperation 2.97%  Prolonged Ventilation 5.89%  Deep Sternal Wound Infection 0.205%  Long Hospital Stay (>14 days) 4.78%  Short Hospital Stay (<6 days) 35.4%

## 2024-09-03 NOTE — OP NOTE
DATE OF PROCEDURE: 9/3/2024    PREOPERATIVE DIAGNOSES:  1. Multivessel coronary artery disease  2. Aortic valve stenosis    3. Hypertension    4. Hyperlipidemia    5. Diabetes mellitus      POSTOPERATIVE DIAGNOSES:    1. Multivessel coronary artery disease  2. Aortic valve stenosis    3. Hypertension    4. Hyperlipidemia    5. Diabetes mellitus      PROCEDURES PERFORMED:    1. Aortic Valve Replacement (23 mm Inspiris tissue valve)  2. Coronary artery bypass graft x 5  3. Endoscopic vein harvesting (Right greater saphenous vein).      SURGEON: Frankie Michaud MD      ASSISTANTS:    1. King George MD was responsible for performing the following activities: Retraction and Suction and their skilled assistance was necessary for the success of this case.  2. Miguel Ko PA-C; Kasey Bateman PA-C were responsible for performing the following activities: Retraction, Suction, Closing, Placing Dressing, and Harvesting of Vessels and their skilled assistance was necessary for the success of this case.    Circulator: Meaghan Ross RN; Humza Broderick RN; Tejal Dawson RN; Melanie Romeo RN  Perfusionist: Charlie Hatch  Scrub Person: Eligio Del Cid; Ryan Warner  Nursing Assistant: Odilia Stanton PCT; eMlanie Gonzalez  Assistant: Kasey Bateman PA-C; Miguel Ko PA  Perfusion Tech: Kathy Bustillo     ANESTHESIA: General endotracheal anesthesia with Dr. Edwina Mcmahon MD    ESTIMATED BLOOD LOSS: 450 mL.      CROSSCLAMP TIME: 174 minutes.      TOTAL CARDIOPULMONARY BYPASS TIME: 196 minutes.      DISTAL BYPASS TARGETS:    1. LIMA to LAD    2. Greater saphenous vein to PDA  3. Greater saphenous vein to OM3  4. Greater saphenous vein to OM2  5. Greater saphenous vein to D2    INDICATIONS: 74 year old  male with a history of hypertension, hyperlipidemia, diabetes mellitus and coronary artery disease status post stenting who presented with chest pain.  He was found to have multivessel  coronary artery disease and aortic valve stenosis.  The patient was felt to be a reasonable candidate for surgical revascularization.  The risks and benefits of surgery were discussed with the patient including pain, bleeding, infection, renal failure, stroke, heart block requiring a pacemaker and death.  The patient understood these risks and wished to proceed with surgery.      DESCRIPTION OF PROCEDURE: The patient was taken to the operating room and placed under general endotracheal anesthesia. A central line, radial arterial line, and Issa catheter were placed intraoperatively. The patient was prepped and draped in the usual sterile fashion and a timeout was performed verifying the patient's name, procedure, consent, beta blockade administration and antibiotics.   The left radial artery was explored through a small incision at the wrist and was heavily calcified.  This was a contraindication to radial artery harvest.  The right greater saphenous vein was harvested from the groin to the ankle using EVH technique. Subcutaneous tissues were closed with a running 3-0 Vicryl suture and 4-0 Monocryl subcuticular stitch. Simultaneously, a median sternotomy incision was made and electrocautery was utilized to gain access to the sternum. A midline sternotomy was performed after lung desufflation and hemostasis was achieved with electrocautery.    Attention was turned to the left internal mammary artery, which was taken down using electrocautery and small clips. Dissection was performed proximally to the subclavian vein and distally to the bifurcation. Dense adhesions were present on the distal mammary artery before the bifurcation to the chest wall that were taken down and not utilized for later bypass grafting by excising the involved segment.  The bifurcation was ligated with 2 large clips to each branch and sharply divided. This revealed excellent flow within the mammary artery which was ligated distally using small  clips and irrigated with papaverine solution and placed in a soaked Ray-Melva sponge in the left pleural space. The pericardium was opened and stay sutures were placed to create a pericardial well. Next, 3-0 Prolene sutures were placed in the ascending aorta and systemic heparin was administered. Additional cannulation sutures were placed in the right atrial appendage, ascending aorta, right atrium and right superior pulmonary vein. After verification of satisfactory activated clotting time, the arterial cannula was placed and connected to the cardiopulmonary bypass circuit after being de-aired. The line was tested and a wrap was performed. The venous cannula was inserted followed by antegrade and retrograde cardioplegia lines. The vein was inspected and found to be of appropriate caliber and quality for bypass grafting. A slit within the pericardial well along the cephalad portion was created for appropriate transition of the mammary pedicle into the mediastinum. Field carbon dioxide was administered during the operation.  Cardiopulmonary bypass was initiated and the patient was allowed to drift in temperature and distal bypass targets were verified. Bypass flow was dropped and the aortic crossclamp was applied. Cardioplegia was administered in an antegrade fashion with cessation of cardiac activity. There was an acceptable septal temperature response. The root and left ventricular vents were turned on high suction.  Additional cardioplegia was given via retrograde with an excellent septal temperature response.    The right coronary artery distribution was thoroughly inspected.  The PDA and posterolateral branches had diffuse disease on palpation.  The PDA was 1.5 mm in diameter and an arteriotomy was made on the distal portion of this vessel and extended proximally and distally. An end-to-side anastomosis was performed with running 7-0 Prolene suture and tied down. Cardioplegia was given down the anastomosis via  hand injection with no evidence of leak and good blood flow. Verification of vein length was obtained by filling the heart and the vein graft was trimmed to the appropriate length. The third obtuse marginal branch was 1.5 mm in diameter and an arteriotomy was made on the distal portion of this vessel and extended proximally and distally. An end-to-side anastomosis was performed with running 7-0 Prolene suture and tied down. Cardioplegia was given down the anastomosis via hand injection with no evidence of leak and good blood flow. The second obtuse marginal branch was 1.5 mm in diameter and an arteriotomy was made on the mid to distal portion of this vessel and extended proximally and distally. A side-to-side anastomosis was performed utilizing the conduit from the third obtuse marginal graft with running 7-0 Prolene suture and tied down. Cardioplegia was given down the anastomosis via hand injection with no evidence of leak and good blood flow. Verification of vein length was obtained by filling the heart and the vein graft was trimmed to the appropriate length. The second diagonal branch was 1.75 mm in diameter and an arteriotomy was made on the mid portion of this vessel and extended proximally and distally. The vessel was probed proximally to the known severe proximal stenosis.  An end-to-side anastomosis was performed with running 7-0 Prolene suture and tied down. Cardioplegia was given down the anastomosis via hand injection with no evidence of leak and good blood flow. Verification of vein length was obtained by filling the heart and the vein graft was trimmed to the appropriate length. The LAD was inspected and found to have proximal disease on palpation. An arteriotomy was made on the mid LAD and extended proximally and distally on this 1.75 mm diameter vessel. The internal mammary artery was then prepared for grafting by ligating the 2 venous branches along the pedicle using small clips. The mammary artery  was trimmed proximal to the bifurcation and beveled for grafting. An end-to-side anastomosis with an 8-0 Prolene suture was constructed and tied down. The bulldog clamp was removed and there was excellent flow within the vessel and adequate filling of the LAD to the apex. The underlying mammary fascia was secured to the epicardium using two 6-0 Prolene sutures.   A transverse aortotomy was made and the aorta exposed with silk sutures.  The aortic valve was examined and found to be heavily calcified and trileaflet.  The valve was sharply excised and sent for permanent pathology.  The annulus was extensively debrided of calcium and three pledgeted valve sutures were placed at the commissures.  The valve was sized at 23 mm. The valve was prepared and the remaining circumferential pledgeted valve sutures were placed and connected to the valve prosthesis.  The valve was lowered into position with adequate seating.  The valve sutures were tied down with proper seating of the valve below the left and right coronary ostia.  The aortotomy was then closed with a 4-0 prolene suture in a running mattress fashion followed by a simple running suture.    Three aortotomies were then made on the proximal ascending aorta and end-to-side proximal anastomoses were carried out using 6-0 Prolene suture and labeled with a radiopaque washers. A hotshot of cardioplegia was given down the ascending aorta after bulldog clamps were applied to the vein grafts. The veins were de-aired and the patient was placed in steep Trendelenburg position. The root vent was turned on high suction and cardiopulmonary bypass flow was turned down with crossclamp subsequently removed. Bypass flows were returned to normal and the bulldog clamps were removed. The heart returned to sinus rhythm. The proximal and distal anastomoses were then inspected and found to be hemostatic.   The patient was subsequently weaned from cardiopulmonary bypass and decannulation was  successfully carried out. All cannulation sites were reinforced with additional 4-0 Prolene suture and inspected for hemostasis. Doppler examination of bypass grafts revealed excellent flow within the mammary and vein grafts.  Ventricular pacing wires were placed and secured using 0 silk suture. Three chest tubes were then placed within the left and right pleural spaces and mediastinum. These were secured using a 0 Ethibond suture. The sternum was reapproximated with #7 stainless steel wire and the linea alba was closed with a running 0 Vicryl suture. Subcutaneous tissues were closed with a 2-0 Vicryl suture and the inferior aspect of the incision was closed with additional interrupted 2-0 Vicryl sutures in the dermal layer. The skin was reapproximated with a 4-0 Monocryl subcuticular stitch and overlying skin glue was applied to the incision. Gauze and tape were applied to the chest tube sites and the patient was subsequently transported to cardiac ICU in stable condition, intubated.

## 2024-09-03 NOTE — ANESTHESIA PROCEDURE NOTES
Central Line      Patient reassessed immediately prior to procedure    Patient location during procedure: OR  Start time: 9/3/2024 7:20 AM  Indications: central pressure monitoring, vascular access and MD/Surgeon request  Staff  Anesthesiologist: Edwina Mcmahon MD  Preanesthetic Checklist  Completed: patient identified, IV checked, site marked, risks and benefits discussed, surgical consent, monitors and equipment checked, pre-op evaluation and timeout performed  Central Line Prep  Sterile Tech:cap, gloves, gown, mask and sterile barriers  Prep: chloraprep  Patient monitoring: blood pressure monitoring, continuous pulse oximetry and EKG  Central Line Procedure  Laterality:right  Location:internal jugular  Catheter Type:MAC  Catheter Size:9 Fr  Guidance:ultrasound guided  PROCEDURE NOTE/ULTRASOUND INTERPRETATION.  Using ultrasound guidance the potential vascular sites for insertion of the catheter were visualized to determine the patency of the vessel to be used for vascular access.  After selecting the appropriate site for insertion, the needle was visualized under ultrasound being inserted into the internal jugular vein, followed by ultrasound confirmation of wire and catheter placement. There were no abnormalities seen on ultrasound; an image was taken; and the patient tolerated the procedure with no complications. Images: still images obtained, printed/placed on chart  Assessment  Post procedure:biopatch applied, line sutured, occlusive dressing applied and secured with tape  Assessement:blood return through all ports, free fluid flow, chest x-ray ordered and Thomas Test  Complications:no  Patient Tolerance:patient tolerated the procedure well with no apparent complications

## 2024-09-04 ENCOUNTER — APPOINTMENT (OUTPATIENT)
Dept: GENERAL RADIOLOGY | Facility: HOSPITAL | Age: 74
DRG: 220 | End: 2024-09-04

## 2024-09-04 LAB
ALBUMIN SERPL-MCNC: 3.9 G/DL (ref 3.5–5.2)
ALBUMIN/GLOB SERPL: 2.6 G/DL
ALP SERPL-CCNC: 66 U/L (ref 39–117)
ALT SERPL W P-5'-P-CCNC: 11 U/L (ref 1–41)
ANION GAP SERPL CALCULATED.3IONS-SCNC: 10 MMOL/L (ref 5–15)
AST SERPL-CCNC: 59 U/L (ref 1–40)
BASE EXCESS BLDA CALC-SCNC: -1 MMOL/L (ref -5–5)
BASE EXCESS BLDA CALC-SCNC: -2 MMOL/L (ref -5–5)
BASE EXCESS BLDA CALC-SCNC: -5 MMOL/L (ref -5–5)
BASE EXCESS BLDA CALC-SCNC: -5 MMOL/L (ref -5–5)
BASE EXCESS BLDA CALC-SCNC: -7 MMOL/L (ref -5–5)
BASE EXCESS BLDA CALC-SCNC: 0 MMOL/L (ref -5–5)
BASOPHILS # BLD AUTO: 0.03 10*3/MM3 (ref 0–0.2)
BASOPHILS NFR BLD AUTO: 0.2 % (ref 0–1.5)
BILIRUB SERPL-MCNC: 1.4 MG/DL (ref 0–1.2)
BUN SERPL-MCNC: 14 MG/DL (ref 8–23)
BUN/CREAT SERPL: 16.9 (ref 7–25)
CA-I BLDA-SCNC: 0.96 MMOL/L (ref 1.2–1.32)
CA-I BLDA-SCNC: 1.02 MMOL/L (ref 1.2–1.32)
CA-I BLDA-SCNC: 1.1 MMOL/L (ref 1.2–1.32)
CA-I BLDA-SCNC: 1.12 MMOL/L (ref 1.2–1.32)
CA-I BLDA-SCNC: 1.14 MMOL/L (ref 1.2–1.32)
CA-I BLDA-SCNC: 1.16 MMOL/L (ref 1.2–1.32)
CA-I BLDA-SCNC: 1.17 MMOL/L (ref 1.2–1.32)
CA-I BLDA-SCNC: 1.17 MMOL/L (ref 1.2–1.32)
CA-I BLDA-SCNC: 1.22 MMOL/L (ref 1.2–1.32)
CA-I BLDA-SCNC: 1.61 MMOL/L (ref 1.2–1.32)
CALCIUM SPEC-SCNC: 8.3 MG/DL (ref 8.6–10.5)
CHLORIDE SERPL-SCNC: 110 MMOL/L (ref 98–107)
CHOLEST SERPL-MCNC: 58 MG/DL (ref 0–200)
CO2 BLDA-SCNC: 18 MMOL/L (ref 24–29)
CO2 BLDA-SCNC: 21 MMOL/L (ref 24–29)
CO2 BLDA-SCNC: 22 MMOL/L (ref 24–29)
CO2 BLDA-SCNC: 24 MMOL/L (ref 24–29)
CO2 BLDA-SCNC: 25 MMOL/L (ref 24–29)
CO2 BLDA-SCNC: 26 MMOL/L (ref 24–29)
CO2 BLDA-SCNC: 27 MMOL/L (ref 24–29)
CO2 BLDA-SCNC: 27 MMOL/L (ref 24–29)
CO2 SERPL-SCNC: 21 MMOL/L (ref 22–29)
CREAT SERPL-MCNC: 0.83 MG/DL (ref 0.76–1.27)
DEPRECATED RDW RBC AUTO: 45.9 FL (ref 37–54)
EGFRCR SERPLBLD CKD-EPI 2021: 93 ML/MIN/1.73
EOSINOPHIL # BLD AUTO: 0.14 10*3/MM3 (ref 0–0.4)
EOSINOPHIL NFR BLD AUTO: 1.1 % (ref 0.3–6.2)
ERYTHROCYTE [DISTWIDTH] IN BLOOD BY AUTOMATED COUNT: 14.4 % (ref 12.3–15.4)
GLOBULIN UR ELPH-MCNC: 1.5 GM/DL
GLUCOSE BLDC GLUCOMTR-MCNC: 145 MG/DL (ref 70–130)
GLUCOSE BLDC GLUCOMTR-MCNC: 145 MG/DL (ref 70–130)
GLUCOSE BLDC GLUCOMTR-MCNC: 151 MG/DL (ref 70–130)
GLUCOSE BLDC GLUCOMTR-MCNC: 152 MG/DL (ref 70–130)
GLUCOSE BLDC GLUCOMTR-MCNC: 153 MG/DL (ref 70–130)
GLUCOSE BLDC GLUCOMTR-MCNC: 154 MG/DL (ref 70–130)
GLUCOSE BLDC GLUCOMTR-MCNC: 162 MG/DL (ref 70–130)
GLUCOSE BLDC GLUCOMTR-MCNC: 163 MG/DL (ref 70–130)
GLUCOSE BLDC GLUCOMTR-MCNC: 165 MG/DL (ref 70–130)
GLUCOSE BLDC GLUCOMTR-MCNC: 166 MG/DL (ref 70–130)
GLUCOSE BLDC GLUCOMTR-MCNC: 167 MG/DL (ref 70–130)
GLUCOSE BLDC GLUCOMTR-MCNC: 170 MG/DL (ref 70–130)
GLUCOSE BLDC GLUCOMTR-MCNC: 173 MG/DL (ref 70–130)
GLUCOSE BLDC GLUCOMTR-MCNC: 175 MG/DL (ref 70–130)
GLUCOSE BLDC GLUCOMTR-MCNC: 177 MG/DL (ref 70–130)
GLUCOSE BLDC GLUCOMTR-MCNC: 179 MG/DL (ref 70–130)
GLUCOSE BLDC GLUCOMTR-MCNC: 181 MG/DL (ref 70–130)
GLUCOSE BLDC GLUCOMTR-MCNC: 181 MG/DL (ref 70–130)
GLUCOSE BLDC GLUCOMTR-MCNC: 188 MG/DL (ref 70–130)
GLUCOSE BLDC GLUCOMTR-MCNC: 192 MG/DL (ref 70–130)
GLUCOSE SERPL-MCNC: 191 MG/DL (ref 65–99)
HCO3 BLDA-SCNC: 17.4 MMOL/L (ref 22–26)
HCO3 BLDA-SCNC: 20.2 MMOL/L (ref 22–26)
HCO3 BLDA-SCNC: 20.4 MMOL/L (ref 22–26)
HCO3 BLDA-SCNC: 22.8 MMOL/L (ref 22–26)
HCO3 BLDA-SCNC: 24 MMOL/L (ref 22–26)
HCO3 BLDA-SCNC: 24.5 MMOL/L (ref 22–26)
HCO3 BLDA-SCNC: 24.6 MMOL/L (ref 22–26)
HCO3 BLDA-SCNC: 24.9 MMOL/L (ref 22–26)
HCO3 BLDA-SCNC: 25.1 MMOL/L (ref 22–26)
HCO3 BLDA-SCNC: 25.6 MMOL/L (ref 22–26)
HCT VFR BLD AUTO: 36 % (ref 37.5–51)
HCT VFR BLDA CALC: 33 % (ref 38–51)
HCT VFR BLDA CALC: 34 % (ref 38–51)
HCT VFR BLDA CALC: 34 % (ref 38–51)
HCT VFR BLDA CALC: 35 % (ref 38–51)
HCT VFR BLDA CALC: 35 % (ref 38–51)
HCT VFR BLDA CALC: 36 % (ref 38–51)
HCT VFR BLDA CALC: 42 % (ref 38–51)
HCT VFR BLDA CALC: 43 % (ref 38–51)
HDLC SERPL-MCNC: 27 MG/DL (ref 40–60)
HGB BLD-MCNC: 11.9 G/DL (ref 13–17.7)
HGB BLDA-MCNC: 11.2 G/DL (ref 12–17)
HGB BLDA-MCNC: 11.6 G/DL (ref 12–17)
HGB BLDA-MCNC: 11.6 G/DL (ref 12–17)
HGB BLDA-MCNC: 11.9 G/DL (ref 12–17)
HGB BLDA-MCNC: 11.9 G/DL (ref 12–17)
HGB BLDA-MCNC: 12.2 G/DL (ref 12–17)
HGB BLDA-MCNC: 14.3 G/DL (ref 12–17)
HGB BLDA-MCNC: 14.6 G/DL (ref 12–17)
IMM GRANULOCYTES # BLD AUTO: 0.12 10*3/MM3 (ref 0–0.05)
IMM GRANULOCYTES NFR BLD AUTO: 1 % (ref 0–0.5)
INR PPP: 1.94 (ref 0.89–1.12)
LDLC SERPL CALC-MCNC: 11 MG/DL (ref 0–100)
LDLC/HDLC SERPL: 0.36 {RATIO}
LYMPHOCYTES # BLD AUTO: 1.38 10*3/MM3 (ref 0.7–3.1)
LYMPHOCYTES NFR BLD AUTO: 11 % (ref 19.6–45.3)
MAGNESIUM SERPL-MCNC: 2 MG/DL (ref 1.6–2.4)
MCH RBC QN AUTO: 29.3 PG (ref 26.6–33)
MCHC RBC AUTO-ENTMCNC: 33.1 G/DL (ref 31.5–35.7)
MCV RBC AUTO: 88.7 FL (ref 79–97)
MONOCYTES # BLD AUTO: 1.38 10*3/MM3 (ref 0.1–0.9)
MONOCYTES NFR BLD AUTO: 11 % (ref 5–12)
NEUTROPHILS NFR BLD AUTO: 75.7 % (ref 42.7–76)
NEUTROPHILS NFR BLD AUTO: 9.45 10*3/MM3 (ref 1.7–7)
NRBC BLD AUTO-RTO: 0 /100 WBC (ref 0–0.2)
PCO2 BLDA: 26.4 MM HG (ref 35–45)
PCO2 BLDA: 34.3 MM HG (ref 35–45)
PCO2 BLDA: 36.5 MM HG (ref 35–45)
PCO2 BLDA: 37.4 MM HG (ref 35–45)
PCO2 BLDA: 38.6 MM HG (ref 35–45)
PCO2 BLDA: 40.7 MM HG (ref 35–45)
PCO2 BLDA: 41.4 MM HG (ref 35–45)
PCO2 BLDA: 45.1 MM HG (ref 35–45)
PCO2 BLDA: 49.8 MM HG (ref 35–45)
PCO2 BLDA: 53.9 MM HG (ref 35–45)
PH BLDA: 7.28 PH UNITS (ref 7.35–7.6)
PH BLDA: 7.31 PH UNITS (ref 7.35–7.6)
PH BLDA: 7.34 PH UNITS (ref 7.35–7.6)
PH BLDA: 7.35 PH UNITS (ref 7.35–7.6)
PH BLDA: 7.38 PH UNITS (ref 7.35–7.6)
PH BLDA: 7.39 PH UNITS (ref 7.35–7.6)
PH BLDA: 7.43 PH UNITS (ref 7.35–7.6)
PH BLDA: 7.43 PH UNITS (ref 7.35–7.6)
PLATELET # BLD AUTO: 118 10*3/MM3 (ref 140–450)
PMV BLD AUTO: 10.8 FL (ref 6–12)
PO2 BLDA: 107 MMHG (ref 80–105)
PO2 BLDA: 132 MMHG (ref 80–105)
PO2 BLDA: 349 MMHG (ref 80–105)
PO2 BLDA: 369 MMHG (ref 80–105)
PO2 BLDA: 387 MMHG (ref 80–105)
PO2 BLDA: 390 MMHG (ref 80–105)
PO2 BLDA: 393 MMHG (ref 80–105)
PO2 BLDA: 399 MMHG (ref 80–105)
PO2 BLDA: 471 MMHG (ref 80–105)
PO2 BLDA: 64 MMHG (ref 80–105)
POTASSIUM BLDA-SCNC: 3.8 MMOL/L (ref 3.5–4.9)
POTASSIUM BLDA-SCNC: 3.9 MMOL/L (ref 3.5–4.9)
POTASSIUM BLDA-SCNC: 4.2 MMOL/L (ref 3.5–4.9)
POTASSIUM BLDA-SCNC: 4.3 MMOL/L (ref 3.5–4.9)
POTASSIUM BLDA-SCNC: 4.4 MMOL/L (ref 3.5–4.9)
POTASSIUM BLDA-SCNC: 5.3 MMOL/L (ref 3.5–4.9)
POTASSIUM BLDA-SCNC: 5.6 MMOL/L (ref 3.5–4.9)
POTASSIUM BLDA-SCNC: 5.7 MMOL/L (ref 3.5–4.9)
POTASSIUM SERPL-SCNC: 4.5 MMOL/L (ref 3.5–5.2)
PROT SERPL-MCNC: 5.4 G/DL (ref 6–8.5)
PROTHROMBIN TIME: 22.2 SECONDS (ref 12.2–14.5)
RBC # BLD AUTO: 4.06 10*6/MM3 (ref 4.14–5.8)
SAO2 % BLDA: 100 % (ref 95–98)
SAO2 % BLDA: 92 % (ref 95–98)
SAO2 % BLDA: 98 % (ref 95–98)
SAO2 % BLDA: 99 % (ref 95–98)
SODIUM BLD-SCNC: 135 MMOL/L (ref 138–146)
SODIUM BLD-SCNC: 136 MMOL/L (ref 138–146)
SODIUM BLD-SCNC: 137 MMOL/L (ref 138–146)
SODIUM BLD-SCNC: 137 MMOL/L (ref 138–146)
SODIUM BLD-SCNC: 138 MMOL/L (ref 138–146)
SODIUM BLD-SCNC: 139 MMOL/L (ref 138–146)
SODIUM BLD-SCNC: 140 MMOL/L (ref 138–146)
SODIUM SERPL-SCNC: 141 MMOL/L (ref 136–145)
TRIGL SERPL-MCNC: 106 MG/DL (ref 0–150)
VLDLC SERPL-MCNC: 20 MG/DL (ref 5–40)
WBC NRBC COR # BLD AUTO: 12.5 10*3/MM3 (ref 3.4–10.8)

## 2024-09-04 PROCEDURE — 71045 X-RAY EXAM CHEST 1 VIEW: CPT

## 2024-09-04 PROCEDURE — 83735 ASSAY OF MAGNESIUM: CPT | Performed by: PHYSICIAN ASSISTANT

## 2024-09-04 PROCEDURE — 99232 SBSQ HOSP IP/OBS MODERATE 35: CPT | Performed by: INTERNAL MEDICINE

## 2024-09-04 PROCEDURE — 63710000001 INSULIN GLARGINE PER 5 UNITS: Performed by: INTERNAL MEDICINE

## 2024-09-04 PROCEDURE — 85610 PROTHROMBIN TIME: CPT | Performed by: PHYSICIAN ASSISTANT

## 2024-09-04 PROCEDURE — 80053 COMPREHEN METABOLIC PANEL: CPT | Performed by: PHYSICIAN ASSISTANT

## 2024-09-04 PROCEDURE — 25010000002 CEFAZOLIN PER 500 MG: Performed by: INTERNAL MEDICINE

## 2024-09-04 PROCEDURE — 85025 COMPLETE CBC W/AUTO DIFF WBC: CPT | Performed by: PHYSICIAN ASSISTANT

## 2024-09-04 PROCEDURE — 25010000002 MORPHINE PER 10 MG: Performed by: THORACIC SURGERY (CARDIOTHORACIC VASCULAR SURGERY)

## 2024-09-04 PROCEDURE — 93010 ELECTROCARDIOGRAM REPORT: CPT | Performed by: INTERNAL MEDICINE

## 2024-09-04 PROCEDURE — 80061 LIPID PANEL: CPT | Performed by: PHYSICIAN ASSISTANT

## 2024-09-04 PROCEDURE — 82948 REAGENT STRIP/BLOOD GLUCOSE: CPT

## 2024-09-04 PROCEDURE — 97162 PT EVAL MOD COMPLEX 30 MIN: CPT

## 2024-09-04 PROCEDURE — 93005 ELECTROCARDIOGRAM TRACING: CPT | Performed by: PHYSICIAN ASSISTANT

## 2024-09-04 PROCEDURE — 63710000001 INSULIN LISPRO (HUMAN) PER 5 UNITS: Performed by: INTERNAL MEDICINE

## 2024-09-04 RX ORDER — IBUPROFEN 600 MG/1
1 TABLET ORAL
Status: DISCONTINUED | OUTPATIENT
Start: 2024-09-04 | End: 2024-09-09 | Stop reason: HOSPADM

## 2024-09-04 RX ORDER — NICOTINE POLACRILEX 4 MG
15 LOZENGE BUCCAL
Status: DISCONTINUED | OUTPATIENT
Start: 2024-09-04 | End: 2024-09-09 | Stop reason: HOSPADM

## 2024-09-04 RX ORDER — INSULIN LISPRO 100 [IU]/ML
4 INJECTION, SOLUTION INTRAVENOUS; SUBCUTANEOUS
Status: DISCONTINUED | OUTPATIENT
Start: 2024-09-04 | End: 2024-09-05 | Stop reason: ALTCHOICE

## 2024-09-04 RX ORDER — DEXTROSE MONOHYDRATE 25 G/50ML
25 INJECTION, SOLUTION INTRAVENOUS
Status: DISCONTINUED | OUTPATIENT
Start: 2024-09-04 | End: 2024-09-09 | Stop reason: HOSPADM

## 2024-09-04 RX ORDER — METHOCARBAMOL 500 MG/1
500 TABLET, FILM COATED ORAL EVERY 8 HOURS SCHEDULED
Status: DISCONTINUED | OUTPATIENT
Start: 2024-09-04 | End: 2024-09-06

## 2024-09-04 RX ADMIN — HYDROCODONE BITARTRATE AND ACETAMINOPHEN 1 TABLET: 7.5; 325 TABLET ORAL at 08:03

## 2024-09-04 RX ADMIN — SODIUM CHLORIDE 2000 MG: 900 INJECTION INTRAVENOUS at 12:07

## 2024-09-04 RX ADMIN — FAMOTIDINE 20 MG: 20 TABLET, FILM COATED ORAL at 06:33

## 2024-09-04 RX ADMIN — Medication 12.5 MG: at 21:19

## 2024-09-04 RX ADMIN — INSULIN GLARGINE 12 UNITS: 100 INJECTION, SOLUTION SUBCUTANEOUS at 12:06

## 2024-09-04 RX ADMIN — INSULIN LISPRO 4 UNITS: 100 INJECTION, SOLUTION INTRAVENOUS; SUBCUTANEOUS at 17:12

## 2024-09-04 RX ADMIN — METHOCARBAMOL 500 MG: 500 TABLET ORAL at 09:40

## 2024-09-04 RX ADMIN — INSULIN HUMAN 2.3 UNITS/HR: 1 INJECTION, SOLUTION INTRAVENOUS at 04:14

## 2024-09-04 RX ADMIN — MORPHINE SULFATE 2 MG: 2 INJECTION, SOLUTION INTRAMUSCULAR; INTRAVENOUS at 06:29

## 2024-09-04 RX ADMIN — CHLORHEXIDINE GLUCONATE, 0.12% ORAL RINSE 15 ML: 1.2 SOLUTION DENTAL at 08:03

## 2024-09-04 RX ADMIN — ATORVASTATIN CALCIUM 40 MG: 40 TABLET, FILM COATED ORAL at 21:19

## 2024-09-04 RX ADMIN — MORPHINE SULFATE 2 MG: 2 INJECTION, SOLUTION INTRAMUSCULAR; INTRAVENOUS at 12:06

## 2024-09-04 RX ADMIN — METHOCARBAMOL 500 MG: 500 TABLET ORAL at 13:04

## 2024-09-04 RX ADMIN — MORPHINE SULFATE 2 MG: 2 INJECTION, SOLUTION INTRAMUSCULAR; INTRAVENOUS at 15:56

## 2024-09-04 RX ADMIN — Medication 12.5 MG: at 08:04

## 2024-09-04 RX ADMIN — OXYCODONE HYDROCHLORIDE 10 MG: 10 TABLET ORAL at 10:28

## 2024-09-04 RX ADMIN — OXYCODONE HYDROCHLORIDE 10 MG: 10 TABLET ORAL at 15:56

## 2024-09-04 RX ADMIN — SENNOSIDES AND DOCUSATE SODIUM 2 TABLET: 50; 8.6 TABLET ORAL at 21:19

## 2024-09-04 RX ADMIN — FAMOTIDINE 20 MG: 20 TABLET, FILM COATED ORAL at 15:57

## 2024-09-04 RX ADMIN — SODIUM CHLORIDE 2000 MG: 900 INJECTION INTRAVENOUS at 06:29

## 2024-09-04 RX ADMIN — ACETAMINOPHEN 650 MG: 325 TABLET ORAL at 21:19

## 2024-09-04 RX ADMIN — OXYCODONE HYDROCHLORIDE 10 MG: 10 TABLET ORAL at 05:36

## 2024-09-04 RX ADMIN — METHOCARBAMOL 500 MG: 500 TABLET ORAL at 21:19

## 2024-09-04 RX ADMIN — SODIUM CHLORIDE 2000 MG: 900 INJECTION INTRAVENOUS at 21:19

## 2024-09-04 RX ADMIN — CHLORHEXIDINE GLUCONATE, 0.12% ORAL RINSE 15 ML: 1.2 SOLUTION DENTAL at 21:19

## 2024-09-04 RX ADMIN — NOREPINEPHRINE BITARTRATE 0.06 MCG/KG/MIN: 0.03 INJECTION, SOLUTION INTRAVENOUS at 04:14

## 2024-09-04 RX ADMIN — HYDROCODONE BITARTRATE AND ACETAMINOPHEN 1 TABLET: 7.5; 325 TABLET ORAL at 12:06

## 2024-09-04 RX ADMIN — SENNOSIDES AND DOCUSATE SODIUM 2 TABLET: 50; 8.6 TABLET ORAL at 08:03

## 2024-09-04 RX ADMIN — HYDROCODONE BITARTRATE AND ACETAMINOPHEN 1 TABLET: 7.5; 325 TABLET ORAL at 02:18

## 2024-09-04 RX ADMIN — ASPIRIN 325 MG: 325 TABLET, COATED ORAL at 08:04

## 2024-09-04 RX ADMIN — ACETAMINOPHEN 650 MG: 325 TABLET ORAL at 04:14

## 2024-09-04 NOTE — PROGRESS NOTES
[facility] at [facility]  IP Progress Note    PROBLEM LIST:  Coronary artery disease status postcoronary artery bypass graft x 5  Moderate to severe aortic stenosis status post AVR #23  Cardiomyopathy with EF 30 to 35%  Hypertension  Hyperlipidemia      HOSPITAL COURSE:  Patient underwent successful revascularization.  Patient is doing much better      CHIEF COMPLAINTS:  Chest pain and shortness of breath      Subjective   Sitting in the chair eating a small breakfast.  Sore at the chest tube insertion sites      Objective     Blood pressure 105/65, pulse 85, temperature 100 °F (37.8 °C), temperature source Bladder, resp. rate 24, SpO2 97%.     Intake/Output Summary (Last 24 hours) at 9/4/2024 0853  Last data filed at 9/4/2024 0629  Gross per 24 hour   Intake 5621.2 ml   Output 4865 ml   Net 756.2 ml       PHYSICAL EXAM:  Constitutional:       General: Not in acute distress.     Appearance: Healthy appearance. Not in distress.     Neck:     JVP:Not elevated     Carotid artery: Normal    Pulmonary:      Effort: Pulmonary effort is normal.      Breath sounds: Normal breath sounds. No wheezing. No rhonchi. No rales.     Cardiovascular:      Normal rate. Regular rhythm. Normal S1. Normal S2.      Murmurs: There is no significant murmur.      No gallop. No click. No rub.     Abdominal:      General: Bowel sounds are normal.      Palpations: Abdomen is soft.      Tenderness: There is no abdominal tenderness.    Extremities:     Pulses:Normal radial and pedal pulses     Edema:no edema    MEDICATIONS:    acetaminophen, 650 mg, Oral, Q8H  aspirin, 325 mg, Oral, Daily  atorvastatin, 40 mg, Oral, Nightly  ceFAZolin, 2,000 mg, Intravenous, Q8H  chlorhexidine, 15 mL, Mouth/Throat, Q12H  famotidine, 20 mg, Oral, BID AC  methocarbamol, 500 mg, Oral, Q8H  metoprolol tartrate, 12.5 mg, Oral, Q12H  metoprolol tartrate, 2.5 mg, Intravenous, Q6H  pharmacy consult - MTM, , Does not apply, Daily  protamine, 50 mg,  "Intravenous, Once  senna-docusate sodium, 2 tablet, Oral, BID          Results from last 7 days   Lab Units 09/04/24  0407   WBC 10*3/mm3 12.50*   HEMOGLOBIN g/dL 11.9*   HEMATOCRIT % 36.0*   PLATELETS 10*3/mm3 118*     Results from last 7 days   Lab Units 09/04/24  0407   SODIUM mmol/L 141   POTASSIUM mmol/L 4.5   CHLORIDE mmol/L 110*   CO2 mmol/L 21.0*   BUN mg/dL 14   CREATININE mg/dL 0.83   CALCIUM mg/dL 8.3*   BILIRUBIN mg/dL 1.4*   ALK PHOS U/L 66   ALT (SGPT) U/L 11   AST (SGOT) U/L 59*   GLUCOSE mg/dL 191*     Results from last 7 days   Lab Units 09/04/24  0407 09/03/24  1432 09/03/24  0638   INR  1.94* 1.88* 1.16*     Lab Results   Component Value Date    TROPONINT 27 (H) 07/29/2024         Results from last 7 days   Lab Units 09/04/24  0407   CHOLESTEROL mg/dL 58   TRIGLYCERIDES mg/dL 106   HDL CHOL mg/dL 27*   LDL CHOL mg/dL 11         No results found for: \"IRON\", \"FERRITIN\", \"LABIRON\", \"TIBC\"   Hemoglobin A1C   Date Value Ref Range Status   08/23/2024 7.50 (H) 4.80 - 5.60 % Final     Magnesium   Date Value Ref Range Status   09/04/2024 2.0 1.6 - 2.4 mg/dL Final        RESULT REVIEW:    I reviewed the patient's new clinical results.    Tele: Sinus Rythym      ASSESSMENT:     Coronary artery disease status postcoronary artery bypass graft  AVR #23  Cardiomyopathy  Hypertension  Hyperlipidemia    PLAN:     Patient is on aspirin for coronary artery disease.  We will add Plavix as his initial presentation was non-STEMI once the chest tubes are out.  He is on moderate to high-dose of statin which we will continue along with beta-blocker.  Once his tubes and Issa catheter is out we will start him on Jardiance and I have no clue if he will be able to afford once he leaves the hospital  We will keep adding guideline directed therapy once he is off of pressors.  Coming along good.    "

## 2024-09-04 NOTE — THERAPY EVALUATION
Patient Name: Hossein Gama  : 1950    MRN: [id number]                              Today's Date: 2024       Admit Date: 9/3/2024    Visit Dx:     ICD-10-CM ICD-9-CM   1. Coronary artery disease involving native coronary artery of native heart with angina pectoris  I25.119 414.01     413.9     Patient Active Problem List   Diagnosis    CAD (coronary artery disease)    Benign essential HTN    Congestive heart failure with left ventricular dysfunction    Type 2 diabetes mellitus    Aortic stenosis    Dyslipidemia     Past Medical History:   Diagnosis Date    Bladder infection     h/o    CAD (coronary artery disease) 2024    Chest pain     CHF (congestive heart failure)     Deep vein thrombosis     LEFT LEG    Diabetes     GERD (gastroesophageal reflux disease)     Glaucoma     mild    Headache     Heart failure 2024    Hyperlipidemia     Hypertension     Joint pain     Light headed     Myocardial infarction     98    Pneumonia     Psoriasis     bilat knee at times-  topical ointment prn    Tinnitus      Past Surgical History:   Procedure Laterality Date    CHOLECYSTECTOMY      CORONARY ARTERY BYPASS GRAFT WITH AORTIC VALVE REPAIR/REPLACEMENT N/A 9/3/2024    Procedure: MEDIAN STERNOTOMY, CORONARY ARTERY BYPASS GRAFTING X5 UTILIZING THE LEFT INTERNAL MAMMERY ARTERY, EVH OF THE GREATER RIGHT SAPHENOUS VEIN, AORTIC VALVE REPLACEMENT, EXPLORATION OF THE LEFT RADIAL ARTERY AND YOLA PER ANESTHESIA;  Surgeon: Frankie Michaud MD;  [facility];  Service: Cardiothoracic;  Laterality: N/A;    CORONARY STENT PLACEMENT      x1    CYST REMOVAL Left     left shoulder      General Information       Row Name 24 1442          Physical Therapy Time and Intention    Document Type evaluation  -KG     Mode of Treatment physical therapy  -KG       Row Name 24 1442          General Information    Patient Profile Reviewed yes  -KG     Prior Level of Function independent:;all household  mobility;gait;transfer;ADL's;dressing;bathing  -KG     Existing Precautions/Restrictions cardiac;fall;oxygen therapy device and L/min;sternal  -KG     Barriers to Rehab medically complex  -KG       Row Name 09/04/24 1442          Living Environment    People in Home spouse  -KG       Row Name 09/04/24 1442          Home Main Entrance    Number of Stairs, Main Entrance one  -KG     Stair Railings, Main Entrance none  -KG       Row Name 09/04/24 1442          Stairs Within Home, Primary    Number of Stairs, Within Home, Primary none  -KG       Row Name 09/04/24 1442          Cognition    Orientation Status (Cognition) oriented x 3  -KG       Row Name 09/04/24 1442          Safety Issues, Functional Mobility    Safety Issues Affecting Function (Mobility) awareness of need for assistance;insight into deficits/self-awareness;safety precaution awareness;safety precautions follow-through/compliance  -KG     Impairments Affecting Function (Mobility) balance;coordination;endurance/activity tolerance;pain;postural/trunk control;shortness of breath;strength  -KG               User Key  (r) = Recorded By, (t) = Taken By, (c) = Cosigned By      Initials Name Provider Type    KG Mamie Boucher, PT Physical Therapist                   Mobility       Row Name 09/04/24 1444          Bed Mobility    Comment, (Bed Mobility) UIC  -KG       Row Name 09/04/24 1444          Transfers    Comment, (Transfers) VC's for sequencing and safe hand placement to maintain sternal precautions.  -KG       Row Name 09/04/24 1444          Sit-Stand Transfer    Sit-Stand [place] (Transfers) minimum assist (75% patient effort);2 person assist;verbal cues  -KG       Row Name 09/04/24 1444          Gait/Stairs (Locomotion)    [place] Level (Gait) minimum assist (75% patient effort);2 person assist;verbal cues  -KG     Assistive Device (Gait) other (see comments)  B UE support on tripod monitor  -KG     Distance in Feet (Gait) 110  -KG      Deviations/Abnormal Patterns (Gait) alysa decreased;stride length decreased  -KG     Bilateral Gait Deviations forward flexed posture  -KG     Comment, (Gait/Stairs) Pt demonstrated step through gait pattern with slow alysa and decreased step length. Frequent cues for upright posture. Pt required one prolonged standing rest break. Increased encouragement provided for continued forward ambulation. Limited by c/o increased fatigue and SOA.  -KG               User Key  (r) = Recorded By, (t) = Taken By, (c) = Cosigned By      Initials Name Provider Type    KG Mamie Boucher PT Physical Therapist                   Obj/Interventions       Row Name 09/04/24 1446          Range of Motion Comprehensive    General Range of Motion no range of motion deficits identified  -KG     Comment, General Range of Motion B LE WFL  -KG       [facility] Name 09/04/24 1446          Strength Comprehensive (MMT)    Comment, General Manual Muscle Testing (MMT) Assessment B LE grossly 3+/5  -KG       [facility] Name 09/04/24 1446          Balance    Balance Assessment sitting static balance;standing static balance;standing dynamic balance  -KG     Static Sitting Balance standby assist  -KG     Position, Sitting Balance unsupported;sitting in chair  -KG     Static Standing Balance minimal assist  -KG     Dynamic Standing Balance minimal assist;2-person assist  -KG     Position/Device Used, Standing Balance supported  -KG       Row Name 09/04/24 1446          Sensory Assessment (Somatosensory)    Sensory Assessment (Somatosensory) LE sensation intact  -KG               User Key  (r) = Recorded By, (t) = Taken By, (c) = Cosigned By      Initials Name Provider Type    KG Mamie Boucher PT Physical Therapist                   Goals/Plan       Row Name 09/04/24 1447          Bed Mobility Goal 1 (PT)    Activity/Assistive Device (Bed Mobility Goal 1, PT) sit to supine;supine to sit  -KG     [place] Level/Cues Needed (Bed Mobility Goal 1,  PT) minimum assist (75% or more patient effort)  -KG     Time Frame (Bed Mobility Goal 1, PT) short term goal (STG);5 days  -KG     Progress/Outcomes (Bed Mobility Goal 1, PT) goal ongoing  -KG       Row Name 09/04/24 1447          Transfer Goal 1 (PT)    Activity/Assistive Device (Transfer Goal 1, PT) sit-to-stand/stand-to-sit;bed-to-chair/chair-to-bed  -KG     [place] Level/Cues Needed (Transfer Goal 1, PT) independent  -KG     Time Frame (Transfer Goal 1, PT) long term goal (LTG);1 week  -KG     Progress/Outcome (Transfer Goal 1, PT) goal ongoing  -KG       Row Name 09/04/24 1447          Gait Training Goal 1 (PT)    Activity/Assistive Device (Gait Training Goal 1, PT) gait (walking locomotion)  -KG     [place] Level (Gait Training Goal 1, PT) independent  -KG     Distance (Gait Training Goal 1, PT) 400 feet  -KG     Time Frame (Gait Training Goal 1, PT) long term goal (LTG);1 week  -KG     Progress/Outcome (Gait Training Goal 1, PT) goal ongoing  -KG       Row Name 09/04/24 1447          Stairs Goal 1 (PT)    Activity/Assistive Device (Stairs Goal 1, PT) ascending stairs;descending stairs;step-to-step  -KG     [place] Level/Cues Needed (Stairs Goal 1, PT) standby assist  -KG     Number of Stairs (Stairs Goal 1, PT) 1  -KG     Time Frame (Stairs Goal 1, PT) long term goal (LTG);1 week  -KG     Progress/Outcome (Stairs Goal 1, PT) goal ongoing  -KG       Row Name 09/04/24 1447          Therapy Assessment/Plan (PT)    Planned Therapy Interventions (PT) balance training;bed mobility training;gait training;stair training;strengthening;transfer training  -KG               User Key  (r) = Recorded By, (t) = Taken By, (c) = Cosigned By      Initials Name Provider Type    KG Mamie Boucher, PT Physical Therapist                   Clinical Impression       Row Name 09/04/24 1446          Pain    Additional Documentation Pain Scale: FACES Pre/Post-Treatment (Group)  -KG       Row Name 09/04/24 7783           Pain Scale: FACES Pre/Post-Treatment    Pain: FACES Scale, Pretreatment 4-->hurts little more  -KG     Posttreatment Pain Rating 6-->hurts even more  -KG     Pain Location incisional  -KG     Pain Location - chest  -KG       Row Name 09/04/24 1446          Plan of Care Review    Plan of Care Reviewed With patient  -KG     Outcome Evaluation PT initial evaluation completed for pt s/p CABG x5 presenting with generalized weakness, SOA, incisional pain, and decreased functional mobility. Pt ambulated 110ft with [place] x2 and BUE support on tripod monitor. Pt's decreased independence warrants PT skilled care. Recommend D/C home with assistance.  -KG       Row Name 09/04/24 1446          Therapy Assessment/Plan (PT)    Patient/Family Therapy Goals Statement (PT) return to PLOF  -KG     Rehab Potential (PT) good, to achieve stated therapy goals  -KG     Criteria for Skilled Interventions Met (PT) yes;skilled treatment is necessary  -KG     Therapy Frequency (PT) daily  -KG     Predicted Duration of Therapy Intervention (PT) 7 days  -KG       Row Name 09/04/24 1446          Vital Signs    Pre Systolic BP Rehab 102  -KG     Pre Treatment Diastolic BP 64  -KG     Post Systolic BP Rehab 108  -KG     Post Treatment Diastolic BP 60  -KG     Pretreatment Heart Rate (beats/min) 89  -KG     Posttreatment Heart Rate (beats/min) 92  -KG     Pre SpO2 (%) 91  -KG     O2 Delivery Pre Treatment room air  -KG     Intra SpO2 (%) 86  -KG     O2 Delivery Intra Treatment room air  -KG     Post SpO2 (%) 91  -KG     O2 Delivery Post Treatment supplemental O2  -KG     Pre Patient Position Sitting  -KG     Intra Patient Position Standing  -KG     Post Patient Position Sitting  -KG       Row Name 09/04/24 1446          Positioning and Restraints    Pre-Treatment Position sitting in chair/recliner  -KG     Post Treatment Position chair  -KG     In Chair reclined;call light within reach;encouraged to call for assist;with family/caregiver;with  nsg;RUE elevated;LUE elevated;legs elevated  -KG               User Key  (r) = Recorded By, (t) = Taken By, (c) = Cosigned By      Initials Name Provider Type    Mamie Varner PT Physical Therapist                   Outcome Measures       Row Name 09/04/24 1448 09/04/24 0803       How much help from another person do you currently need...    Turning from your back to your side while in flat bed without using bedrails? 3  -KG 4  -SHIVA    Moving from lying on back to sitting on the side of a flat bed without bedrails? 2  -KG 3  -SHIVA    Moving to and from a bed to a chair (including a wheelchair)? 3  -KG 2  -SHIVA    Standing up from a chair using your arms (e.g., wheelchair, bedside chair)? 3  -KG 2  -SHIVA    Climbing 3-5 steps with a railing? 2  -KG 2  -SHIVA    To walk in hospital room? 3  -KG 2  -SHIVA    AM-PAC 6 Clicks Score (PT) 16  -KG 15  -SHIVA    Highest Level of Mobility Goal 5 --> Static standing  -KG 4 --> Transfer to chair/commode  -SHIVA      Row Name 09/04/24 1448          Functional Assessment    Outcome Measure Options AM-PAC 6 Clicks Basic Mobility (PT)  -KG               User Key  (r) = Recorded By, (t) = Taken By, (c) = Cosigned By      Initials Name Provider Type    Gaston Valdes RN Registered Nurse    Mamie Varner PT Physical Therapist                                 Physical Therapy Education       Title: PT OT SLP Therapies (In Progress)       Topic: Physical Therapy (In Progress)       Point: Mobility training (In Progress)       Learning Progress Summary             Patient Acceptance, E, NR by KG at 9/4/2024 1042                         Point: Home exercise program (Not Started)       Learner Progress:  Not documented in this visit.              Point: Body mechanics (In Progress)       Learning Progress Summary             Patient Acceptance, E, NR by KG at 9/4/2024 1042                         Point: Precautions (In Progress)       Learning Progress Summary              Patient Acceptance, E, NR by KG at 9/4/2024 1042                                         User Key       Initials Effective Dates Name Provider Type Discipline    KG 05/22/20 -  Mamie Boucher, PT Physical Therapist PT                  PT Recommendation and Plan  Planned Therapy Interventions (PT): balance training, bed mobility training, gait training, stair training, strengthening, transfer training  Plan of Care Reviewed With: patient  Outcome Evaluation: PT initial evaluation completed for pt s/p CABG x5 presenting with generalized weakness, SOA, incisional pain, and decreased functional mobility. Pt ambulated 110ft with [place] x2 and BUE support on tripod monitor. Pt's decreased independence warrants PT skilled care. Recommend D/C home with assistance.     Time Calculation:   PT Evaluation Complexity  History, PT Evaluation Complexity: 3 or more personal factors and/or comorbidities  Examination of Body Systems (PT Eval Complexity): total of 3 or more elements  Clinical Presentation (PT Evaluation Complexity): evolving  Clinical Decision Making (PT Evaluation Complexity): moderate complexity  Overall Complexity (PT Evaluation Complexity): moderate complexity     PT Charges       Row Name 09/04/24 1042             Time Calculation    Start Time 1042  -KG      PT Received On 09/04/24  -KG      PT Goal Re-Cert Due Date 09/14/24  -KG         Untimed Charges    PT Eval/Re-eval Minutes 47  -KG         Total Minutes    Untimed Charges Total Minutes 47  -KG       Total Minutes 47  -KG                User Key  (r) = Recorded By, (t) = Taken By, (c) = Cosigned By      Initials Name Provider Type    KG Mamie Boucher, PT Physical Therapist                  Therapy Charges for Today       Code Description Service Date Service Provider Modifiers Qty    39530292541 HC PT EVAL MOD COMPLEXITY 4 9/4/2024 Mamie Boucher, PT GP 1    [id number]  PT THER SUPP EA 15 MIN 9/4/2024 Mamie Boucher, PT GP 2             PT G-Codes  Outcome Measure Options: AM-PAC 6 Clicks Basic Mobility (PT)  AM-PAC 6 Clicks Score (PT): 16  PT Discharge Summary  Anticipated Discharge Disposition (PT): home with assist    Cary Boucher, PT  9/4/2024

## 2024-09-04 NOTE — PROGRESS NOTES
Pt. Referred for Phase II Cardiac Rehab. Staff discussed benefits of exercise, program protocol, and educational material provided. Teach back verified. Patient already has information for [facility] cardiac rehab program and will reach out to them if they become interested in the program. Patient also given [facility] brochure to contact the office in case they have any questions or if [facility] needs pt. Information released.

## 2024-09-04 NOTE — PLAN OF CARE
NEURO  A&Ox4, CORRIGAN=. Pain managed with PRNs.     RESPIRATORY  Pt extubated to bipap @1936. Now on 3L NC.     MT 1&2: 160mL  MT 3: 140mL    CARDIAC  NSR, SBP 80-130s with levo. Additional 250mL albumin given.     GI/  Passed bedside dysphagia screen, NG removed, diet advanced to clear liquids.     UOP: 595mL    OTHER  Drips: insulin, levo @.04  Up to chair this AM with assistance.   Family in waiting room, updated frequently on patient condition.     Goal Outcome Evaluation:  Plan of Care Reviewed With: patient, spouse, son, daughter

## 2024-09-04 NOTE — CASE MANAGEMENT/SOCIAL WORK
Discharge Planning Assessment  [facility]     Patient Name: Hossein Gama  MRN: [id number]  Today's Date: 9/4/2024    Admit Date: 9/3/2024    Plan: Home   Discharge Needs Assessment       Row Name 09/04/24 1055       Living Environment    People in Home spouse    Current Living Arrangements home    Potentially Unsafe Housing Conditions none    Primary Care Provided by self    Provides Primary Care For no one    Family Caregiver if Needed spouse    Quality of Family Relationships involved;supportive    Able to Return to Prior Arrangements yes       Resource/Environmental Concerns    Resource/Environmental Concerns none       Transition Planning    Patient/Family Anticipates Transition to home with family    Patient/Family Anticipated Services at Transition ;rehabilitation services    Transportation Anticipated family or friend will provide       Discharge Needs Assessment    Readmission Within the Last 30 Days no previous admission in last 30 days    Equipment Currently Used at Home none    Concerns to be Addressed discharge planning    Anticipated Changes Related to Illness none                   Discharge Plan       Row Name 09/04/24 1056       Plan    Plan Home    Patient/Family in Agreement with Plan yes    Plan Comments Patient lives with his wife in [facility].  He is independent with ADLs.  He has no DME at home and is not current with home health.  His PCP is Charlie Tunrer.  He does not have an advanced directive.  Mr. Gama does not have insurance.  [facility] [employer] has screened patient and he will not qualify for Medicaid.  He has his scripts filled at [facility].  His goal is to return home at discharge.  Will await therapy recommendations to determine proper discharge placement.  CM will continue to follow.    Final Discharge Disposition Code 01 - home or self-care                  Continued Care and Services - Admitted Since 9/3/2024    No active coordination exists for  this encounter.       Expected Discharge Date and Time       Expected Discharge Date Expected Discharge Time    Sep 6, 2024            Demographic Summary       Row Name 09/04/24 1055       General Information    Admission Type inpatient    Arrived From home    Referral Source admission list    Reason for Consult discharge planning    Preferred Language English                   Functional Status       Row Name 09/04/24 1055       Functional Status    Usual Activity Tolerance moderate    Current Activity Tolerance moderate       Functional Status, IADL    Medications independent    Meal Preparation independent    Housekeeping independent    Laundry independent    Shopping independent                   Psychosocial    No documentation.                  Abuse/Neglect    No documentation.                  Legal    No documentation.                  Substance Abuse    No documentation.                  Patient Forms    No documentation.                     Venecia Adan, RN

## 2024-09-04 NOTE — PLAN OF CARE
Goal Outcome Evaluation:  Plan of Care Reviewed With: patient               Neuro: A/Ox4, MAEE. Ambulated well. ~150 feet, twice. Limited by pain.   Resp: lungs CTA. On 2 LPM via NC. IS ~750. Encouraged pulmonary toileting.   Cardiac: SBP . Weaned levo off. +PP. + pericardial rub. 130 ml out of CT #1/#2 and  130 ml out of CT #3, drainage serosanguinous.  GI/: F/C d/Cd ~1000, voids without difficultly. Urine dark with few clots. No BM, +BS  Integ: incisions CDI. No new skin breakdown noted   Pain: Freq c/o pain. Multiple PRN's given with results.

## 2024-09-04 NOTE — PLAN OF CARE
Goal Outcome Evaluation:  Plan of Care Reviewed With: patient           Outcome Evaluation: PT initial evaluation completed for pt s/p CABG x5 presenting with generalized weakness, SOA, incisional pain, and decreased functional mobility. Pt ambulated 110ft with [place] x2 and BUE support on tripod monitor. Pt's decreased independence warrants PT skilled care. Recommend D/C home with assistance.      Anticipated Discharge Disposition (PT): home with assist

## 2024-09-04 NOTE — PROGRESS NOTES
Cardiothoracic Surgery Progress Note      POD # 1 s/p CABG x 5, AVR      LOS: 1 day      Subjective:  On 3L NC, levophed 0.04.  No complaints    Objective:  Vital Signs  Temp:  [97.2 °F (36.2 °C)-100.2 °F (37.9 °C)] 100 °F (37.8 °C)  Heart Rate:  [65-87] 85  Resp:  [14-25] 24  BP: ()/(49-72) 105/65  Arterial Line BP: ()/(34-62) 130/58  FiO2 (%):  [40 %-100 %] 50 %    Physical Exam:   General Appearance: alert, appears stated age and cooperative   Lungs: clear to auscultation, respirations regular, respirations even, and respirations unlabored   Heart: regular rhythm & normal rate, normal S1, S2, no murmur, no gallop, no rub, and no click   Skin: Incision c/d/i    Output by Drain (mL) 09/03/24 0701 - 09/03/24 1900 09/03/24 1901 - 09/04/24 0700 09/04/24 0701 - 09/04/24 0736 Range Total   Chest Tube 3 Anterior Mediastinal 200 140  340   Y Chest Tube 1 and 2 Right Pleural 2 Fr. Left Pleural 220 160  380        Results:    Results from last 7 days   Lab Units 09/04/24  0407   WBC 10*3/mm3 12.50*   HEMOGLOBIN g/dL 11.9*   HEMATOCRIT % 36.0*   PLATELETS 10*3/mm3 118*     Results from last 7 days   Lab Units 09/04/24  0407   SODIUM mmol/L 141   POTASSIUM mmol/L 4.5   CHLORIDE mmol/L 110*   CO2 mmol/L 21.0*   BUN mg/dL 14   CREATININE mg/dL 0.83   GLUCOSE mg/dL 191*   CALCIUM mg/dL 8.3*       Assessment:  POD # 1 s/p CABG x 5, AVR     Plan:  Continue chest tubes and wires  Wean levophed as tolerated  Pulmonary toilet  Ambulate    Frankie Michaud MD  09/04/24  07:36 EDT

## 2024-09-04 NOTE — PROGRESS NOTES
Critical Care Note     LOS: 1 day   Patient Care Team:  Charlie Turner MD as PCP - General (Family Medicine)  Amee Jacobs MD as Cardiologist (Cardiology)    Chief Complaint/Reason for visit:      CABGx5 9/3/24  Diabetes mellitus type 2  Dyslipidemia  Hypertension      Subjective         Interval History:     Patient underwent CABG x 5, LIMA to LAD, saphenous vein to PDA, OM 2, OM 3, D2, aortic valve replacement, tissue, 9/3.  Extubated and is currently on 3 L.  He is on some low-dose norepinephrine, 0.04 mics per kilogram per minute.  He remains in sinus rhythm.  Chest tube output 300 mL in the last 12 hours.  Urine output 595 mL in the last 12 hours but had 2.8 L in the first 12 hours yesterday.  Insulin drip is currently at 2.3 units/h    Review of Systems:    All systems were reviewed and negative except as noted in subjective.    Medical history, surgical history, social history, family history reviewed    Objective     Intake/Output:    Intake/Output Summary (Last 24 hours) at 9/4/2024 1152  Last data filed at 9/4/2024 1100  Gross per 24 hour   Intake 5926.2 ml   Output 4785 ml   Net 1141.2 ml       Nutrition:  Diet: Liquid, Cardiac, Diabetic; Clear Liquid; Healthy Heart (2-3 Na+); Consistent Carbohydrate; Fluid Consistency: Thin (IDDSI 0)    Infusions:  dexmedetomidine, 0.2-1.5 mcg/kg/hr  dextrose 5 % with KCl 20 mEq, 30 mL/hr, Last Rate: 30 mL/hr (09/03/24 1421)  DOBUTamine, 2-20 mcg/kg/min  DOPamine, 2-20 mcg/kg/min  EPINEPHrine, 0.02-0.3 mcg/kg/min  insulin, 0-100 Units/hr, Last Rate: 2.1 Units/hr (09/04/24 0819)  lactated ringers, 9 mL/hr  niCARdipine, 5-15 mg/hr  nitroglycerin, 5-200 mcg/min  norepinephrine, 0.02-0.3 mcg/kg/min, Last Rate: 0.02 mcg/kg/min (09/04/24 1000)  phenylephrine, 0.5-3 mcg/kg/min  propofol, 5-50 mcg/kg/min, Last Rate: Stopped (09/03/24 1740)        Mechanical Ventilator Settings:            Resp Rate (Set): 14  Pressure Support (cm H2O): 10 cm H20  FiO2 (%): 50  %  PEEP/CPAP (cm H2O): 5 cm H20    Minute Ventilation (L/min) (Obs): 8.45 L/min  Resp Rate (Observed) Vent: 26  I:E Ratio (Set): 1:2.94  I:E Ratio (Obs): 3.13:1    PIP Observed (cm H2O): 16 cm H2O       Telemetry: Sinus rhythm             Vital Signs  Blood pressure 99/55, pulse 82, temperature 98.2 °F (36.8 °C), temperature source Axillary, resp. rate 16, SpO2 96%.    Physical Exam:  General Appearance:  Older gentleman, sitting in the chair in no distress   Head:  Atraumatic   Eyes:          Conjunctiva pink   Ears:     Throat: Oral mucosa moist   Neck: Right IJ line   Back:      Lungs:   Sternotomy incision intact.  Mediastinal tubes with serosanguineous output.  Breath sounds are bilateral without wheeze or rhonchi, diminished at the bases posteriorly    Heart:  Heart sounds are distant, regular, S1, S2 auscultated, no appreciable rub   Abdomen:   Bowel sounds present but hypoactive, nondistended, soft   Rectal:   Deferred   Extremities: Right radial arterial line.  Right hand with good capillary refill.  Ace wrap right lower extremity.   Pulses:    Skin: Cool and dry   Lymph nodes:    Neurologic: Alert, conversant, face symmetric, speech fluent      Results Review:     I reviewed the patient's new clinical results.   Results from last 7 days   Lab Units 09/04/24 0407 09/03/24 1817 09/03/24  1432   SODIUM mmol/L 141 144 142   POTASSIUM mmol/L 4.5 4.5 4.0   CHLORIDE mmol/L 110* 112* 112*   CO2 mmol/L 21.0* 19.0* 20.0*   BUN mg/dL 14 11 11   CREATININE mg/dL 0.83 0.81 0.88   CALCIUM mg/dL 8.3* 8.9 8.9   BILIRUBIN mg/dL 1.4*  --   --    ALK PHOS U/L 66  --   --    ALT (SGPT) U/L 11  --   --    AST (SGOT) U/L 59*  --   --    GLUCOSE mg/dL 191* 168* 94     Results from last 7 days   Lab Units 09/04/24 0407 09/03/24 1817 09/03/24  1432   WBC 10*3/mm3 12.50* 15.95* 16.36*   HEMOGLOBIN g/dL 11.9* 12.3* 13.4   HEMATOCRIT % 36.0* 37.4* 40.2   PLATELETS 10*3/mm3 118* 117* 109*     Results from last 7 days   Lab Units  "09/03/24 1915   PH, ARTERIAL pH units 7.329*   PO2 ART mm Hg 69.8*   PCO2, ARTERIAL mm Hg 40.1   HCO3 ART mmol/L 21.1     No results found for: \"BLOODCX\"  No results found for: \"URINECX\"    I reviewed the patient's new imaging including images and reports.    XR CHEST 1 VW    Date of Exam: 9/4/2024 4:21 AM EDT    Indication: Post-Op Heart Surgery    Comparison: Chest x-ray 9/3/2024    Findings:  Interval extubation. Removal of NG/OG tube. Right IJ introducer sheath and CVC remains in place. Thoracostomy tubes remain in place. Mild decrease in lung volumes with bibasilar opacities, worsened on the right, likely atelectasis. Small pleural  effusions are difficult to exclude. No pneumothorax. Stable cardiomegaly.   Impression:     Impression:  Extubation and removal of NG/OG tube.    Mild decrease in lung volumes with worsened bibasilar parenchymal opacities which may reflect atelectasis. Small bilateral pleural effusions are difficult to exclude.      Electronically Signed: Modesto Virgen MD   9/4/2024 7:29 AM EDT       All medications reviewed.   acetaminophen, 650 mg, Oral, Q8H  aspirin, 325 mg, Oral, Daily  atorvastatin, 40 mg, Oral, Nightly  ceFAZolin, 2,000 mg, Intravenous, Q8H  chlorhexidine, 15 mL, Mouth/Throat, Q12H  famotidine, 20 mg, Oral, BID AC  methocarbamol, 500 mg, Oral, Q8H  metoprolol tartrate, 12.5 mg, Oral, Q12H  metoprolol tartrate, 2.5 mg, Intravenous, Q6H  pharmacy consult - MTM, , Does not apply, Daily  protamine, 50 mg, Intravenous, Once  senna-docusate sodium, 2 tablet, Oral, BID          Assessment & Plan       CAD (coronary artery disease)    Benign essential HTN    Congestive heart failure with left ventricular dysfunction    Type 2 diabetes mellitus    Aortic stenosis    Dyslipidemia    74-year-old gentleman, with hypertension, diabetes, multivessel coronary disease who underwent CABG x 5, aortic valve replacement 9/3.   He did not require blood products.  He is currently in sinus " rhythm.  He is on some low-dose norepinephrine to support his blood pressure.  Chest tube output 300 mL over the last 12 hours.  Postoperative hemoglobin is 11.9.  He remains in sinus rhythm.  He is requiring some low-dose norepinephrine, 0.04 mics per kilogram per minute.      His hemoglobin A1c is 7.5.  He is currently on insulin at 2.3 units/h.      PLAN:    Aspirin, statin, beta-blocker  Wean norepinephrine for mean  blood pressure greater than 65    Initiate liquids and advance diet as tolerates  Transition insulin drip    Mobilize with physical therapy      VTE Prophylaxis: foot pumps    Stress Ulcer Prophylaxis: bret Craig MD  09/04/24  11:52 EDT      Time: Critical care 20 min  I personally provided care to this critically ill patient as documented above.  Critical care time does not include time spent on separately billed procedures.  None of my critical care time was concurrent with other critical care providers.

## 2024-09-04 NOTE — PLAN OF CARE
Goal Outcome Evaluation:  Plan of Care Reviewed With: spouse, family        Progress: improving  Outcome Evaluation: Patient had a CABG x5 with a R EVH and left radial explore and an AVR. Came out to 2H ICU at 14:17 on levo, propofol, and insulin. Given 1250 mls of albumin. Patient woke up CORRIGAN=, follows commands, put on spontaneous on the ventilator, ABG was a little acidotic with 7.30, 1 amp of bicarb given, repeat gas was done, MD ordered 1 more amp of bicarb and extubate to bipap. VSS at this time.

## 2024-09-05 ENCOUNTER — APPOINTMENT (OUTPATIENT)
Dept: GENERAL RADIOLOGY | Facility: HOSPITAL | Age: 74
DRG: 220 | End: 2024-09-05

## 2024-09-05 LAB
ANION GAP SERPL CALCULATED.3IONS-SCNC: 13 MMOL/L (ref 5–15)
BUN SERPL-MCNC: 18 MG/DL (ref 8–23)
BUN/CREAT SERPL: 26.9 (ref 7–25)
CALCIUM SPEC-SCNC: 8.4 MG/DL (ref 8.6–10.5)
CHLORIDE SERPL-SCNC: 98 MMOL/L (ref 98–107)
CO2 SERPL-SCNC: 20 MMOL/L (ref 22–29)
CREAT SERPL-MCNC: 0.67 MG/DL (ref 0.76–1.27)
CYTO UR: NORMAL
DEPRECATED RDW RBC AUTO: 46.5 FL (ref 37–54)
EGFRCR SERPLBLD CKD-EPI 2021: 99.2 ML/MIN/1.73
ERYTHROCYTE [DISTWIDTH] IN BLOOD BY AUTOMATED COUNT: 14.5 % (ref 12.3–15.4)
GLUCOSE BLDC GLUCOMTR-MCNC: 120 MG/DL (ref 70–130)
GLUCOSE BLDC GLUCOMTR-MCNC: 128 MG/DL (ref 70–130)
GLUCOSE BLDC GLUCOMTR-MCNC: 176 MG/DL (ref 70–130)
GLUCOSE BLDC GLUCOMTR-MCNC: 182 MG/DL (ref 70–130)
GLUCOSE SERPL-MCNC: 182 MG/DL (ref 65–99)
HCT VFR BLD AUTO: 33 % (ref 37.5–51)
HGB BLD-MCNC: 11.1 G/DL (ref 13–17.7)
LAB AP CASE REPORT: NORMAL
LAB AP CLINICAL INFORMATION: NORMAL
MCH RBC QN AUTO: 29.5 PG (ref 26.6–33)
MCHC RBC AUTO-ENTMCNC: 33.6 G/DL (ref 31.5–35.7)
MCV RBC AUTO: 87.8 FL (ref 79–97)
PATH REPORT.FINAL DX SPEC: NORMAL
PATH REPORT.GROSS SPEC: NORMAL
PLATELET # BLD AUTO: 98 10*3/MM3 (ref 140–450)
PMV BLD AUTO: 10.9 FL (ref 6–12)
POTASSIUM SERPL-SCNC: 4.4 MMOL/L (ref 3.5–5.2)
QT INTERVAL: 382 MS
QT INTERVAL: 408 MS
QT INTERVAL: 522 MS
QTC INTERVAL: 464 MS
QTC INTERVAL: 482 MS
QTC INTERVAL: 563 MS
RBC # BLD AUTO: 3.76 10*6/MM3 (ref 4.14–5.8)
SODIUM SERPL-SCNC: 131 MMOL/L (ref 136–145)
WBC NRBC COR # BLD AUTO: 14.16 10*3/MM3 (ref 3.4–10.8)

## 2024-09-05 PROCEDURE — 85027 COMPLETE CBC AUTOMATED: CPT | Performed by: PHYSICIAN ASSISTANT

## 2024-09-05 PROCEDURE — 80048 BASIC METABOLIC PNL TOTAL CA: CPT | Performed by: PHYSICIAN ASSISTANT

## 2024-09-05 PROCEDURE — 25010000002 CEFAZOLIN PER 500 MG: Performed by: INTERNAL MEDICINE

## 2024-09-05 PROCEDURE — 82948 REAGENT STRIP/BLOOD GLUCOSE: CPT

## 2024-09-05 PROCEDURE — 93005 ELECTROCARDIOGRAM TRACING: CPT | Performed by: PHYSICIAN ASSISTANT

## 2024-09-05 PROCEDURE — 71045 X-RAY EXAM CHEST 1 VIEW: CPT

## 2024-09-05 PROCEDURE — 99232 SBSQ HOSP IP/OBS MODERATE 35: CPT | Performed by: INTERNAL MEDICINE

## 2024-09-05 PROCEDURE — 63710000001 INSULIN LISPRO (HUMAN) PER 5 UNITS: Performed by: INTERNAL MEDICINE

## 2024-09-05 PROCEDURE — 97530 THERAPEUTIC ACTIVITIES: CPT

## 2024-09-05 PROCEDURE — 93010 ELECTROCARDIOGRAM REPORT: CPT | Performed by: STUDENT IN AN ORGANIZED HEALTH CARE EDUCATION/TRAINING PROGRAM

## 2024-09-05 RX ORDER — ATORVASTATIN CALCIUM 40 MG/1
80 TABLET, FILM COATED ORAL NIGHTLY
Status: DISCONTINUED | OUTPATIENT
Start: 2024-09-05 | End: 2024-09-09 | Stop reason: HOSPADM

## 2024-09-05 RX ORDER — INSULIN LISPRO 100 [IU]/ML
2-9 INJECTION, SOLUTION INTRAVENOUS; SUBCUTANEOUS
Status: DISCONTINUED | OUTPATIENT
Start: 2024-09-05 | End: 2024-09-09 | Stop reason: HOSPADM

## 2024-09-05 RX ORDER — FUROSEMIDE 20 MG
20 TABLET ORAL DAILY PRN
COMMUNITY
End: 2024-09-09 | Stop reason: HOSPADM

## 2024-09-05 RX ORDER — POTASSIUM CHLORIDE 750 MG/1
10 TABLET, EXTENDED RELEASE ORAL DAILY PRN
COMMUNITY
End: 2024-09-09 | Stop reason: HOSPADM

## 2024-09-05 RX ORDER — METFORMIN HCL 500 MG
500 TABLET, EXTENDED RELEASE 24 HR ORAL 2 TIMES DAILY WITH MEALS
COMMUNITY

## 2024-09-05 RX ORDER — CLOPIDOGREL BISULFATE 75 MG/1
75 TABLET ORAL DAILY
Status: DISCONTINUED | OUTPATIENT
Start: 2024-09-05 | End: 2024-09-09 | Stop reason: HOSPADM

## 2024-09-05 RX ORDER — ASPIRIN 81 MG/1
81 TABLET ORAL DAILY
Status: DISCONTINUED | OUTPATIENT
Start: 2024-09-05 | End: 2024-09-09 | Stop reason: HOSPADM

## 2024-09-05 RX ADMIN — METHOCARBAMOL 500 MG: 500 TABLET ORAL at 06:32

## 2024-09-05 RX ADMIN — INSULIN LISPRO 2 UNITS: 100 INJECTION, SOLUTION INTRAVENOUS; SUBCUTANEOUS at 12:42

## 2024-09-05 RX ADMIN — EMPAGLIFLOZIN 10 MG: 10 TABLET, FILM COATED ORAL at 12:42

## 2024-09-05 RX ADMIN — Medication 12.5 MG: at 21:30

## 2024-09-05 RX ADMIN — METHOCARBAMOL 500 MG: 500 TABLET ORAL at 21:30

## 2024-09-05 RX ADMIN — INSULIN LISPRO 2 UNITS: 100 INJECTION, SOLUTION INTRAVENOUS; SUBCUTANEOUS at 09:22

## 2024-09-05 RX ADMIN — ATORVASTATIN CALCIUM 80 MG: 40 TABLET, FILM COATED ORAL at 21:30

## 2024-09-05 RX ADMIN — ACETAMINOPHEN 650 MG: 325 TABLET ORAL at 21:30

## 2024-09-05 RX ADMIN — SODIUM CHLORIDE 2000 MG: 900 INJECTION INTRAVENOUS at 05:17

## 2024-09-05 RX ADMIN — ASPIRIN 81 MG: 81 TABLET, COATED ORAL at 09:21

## 2024-09-05 RX ADMIN — SENNOSIDES AND DOCUSATE SODIUM 2 TABLET: 50; 8.6 TABLET ORAL at 09:21

## 2024-09-05 RX ADMIN — SENNOSIDES AND DOCUSATE SODIUM 2 TABLET: 50; 8.6 TABLET ORAL at 21:30

## 2024-09-05 RX ADMIN — CLOPIDOGREL BISULFATE 75 MG: 75 TABLET ORAL at 12:42

## 2024-09-05 RX ADMIN — ACETAMINOPHEN 650 MG: 325 TABLET ORAL at 12:45

## 2024-09-05 RX ADMIN — ACETAMINOPHEN 650 MG: 325 TABLET ORAL at 05:17

## 2024-09-05 NOTE — PLAN OF CARE
Goal Outcome Evaluation:  Plan of Care Reviewed With: patient        Progress: improving  Outcome Evaluation: A&Ox4, rested comfortably. Minimal complaints of pain. Ambulated 160ft with assistance. Room air, using IS with encouragement. MT 1&2: 80mL, MT 3: 80mL. NSR to ST with activity, SBP 90s-100s. Radial art line removed. +BS, -BM. UOP 600mL/shift. Pt up to chair this AM. Wife at bedside, involved in patient care.

## 2024-09-05 NOTE — PLAN OF CARE
Goal Outcome Evaluation:  Plan of Care Reviewed With: (P) patient, spouse        Progress: (P) improving  Outcome Evaluation: (P) Pt presented with dec activity tolerance, endurance, and functional mobility. Pt completed STS with CGA x1 and ambulated 220 ft with CGA x1 using tripod monitor and no rest breaks. Pt continue to require skilled IP PT services to return to PLOF. Rec d/c home with assist once medically stable.      Anticipated Discharge Disposition (PT): (P) home with assist

## 2024-09-05 NOTE — PROGRESS NOTES
Critical Care Note     LOS: 2 days   Patient Care Team:  Charlie Turner MD as PCP - General (Family Medicine)  Amee Jacobs MD as Cardiologist (Cardiology)    Chief Complaint/Reason for visit:      CABGx5 9/3/24  Diabetes mellitus type 2  Dyslipidemia  Hypertension      Subjective         Interval History:     Patient underwent CABG x 5, LIMA to LAD, saphenous vein to PDA, OM 2, OM 3, D2, aortic valve replacement, tissue, 9/3.  Extubated night of surgery and is currently on RA.  He is on some low-dose norepinephrine, 0.04 mics per kilogram per minute.  He remains in sinus rhythm.  Mediastinal and chest tubes were removed this morning.  He has not voided this morning.    Review of Systems:    All systems were reviewed and negative except as noted in subjective.    Medical history, surgical history, social history, family history reviewed    Objective     Intake/Output:    Intake/Output Summary (Last 24 hours) at 9/5/2024 1152  Last data filed at 9/5/2024 0800  Gross per 24 hour   Intake 1740.94 ml   Output 1090 ml   Net 650.94 ml       Nutrition:  Diet: Cardiac, Diabetic; Healthy Heart (2-3 Na+); Consistent Carbohydrate; Fluid Consistency: Thin (IDDSI 0)    Infusions:  niCARdipine, 5-15 mg/hr  nitroglycerin, 5-200 mcg/min        Mechanical Ventilator Settings:            Resp Rate (Set): 14  Pressure Support (cm H2O): 10 cm H20  FiO2 (%): 50 %  PEEP/CPAP (cm H2O): 5 cm H20    Minute Ventilation (L/min) (Obs): 8.45 L/min  Resp Rate (Observed) Vent: 26  I:E Ratio (Set): 1:2.94  I:E Ratio (Obs): 3.13:1    PIP Observed (cm H2O): 16 cm H2O       Telemetry: Sinus rhythm             Vital Signs  Blood pressure 93/59, pulse 90, temperature 99.1 °F (37.3 °C), temperature source Oral, resp. rate 20, SpO2 94%.    Physical Exam:  General Appearance:  Older gentleman, sitting in the chair in no distress   Head:  Atraumatic   Eyes:          Conjunctiva pink   Ears:     Throat: Oral mucosa moist   Neck: Right IJ line  "  Back:      Lungs:   Sternotomy incision intact. Breath sounds are bilateral without wheeze or rhonchi.  Improved breath sounds at the bases.    Heart:  Heart sounds are distant, regular, S1, S2 auscultated, no appreciable rub   Abdomen:   Bowel sounds present , nondistended, soft   Rectal:   Deferred   Extremities: Right lower extremity incisions intact without erythema or drainage   Pulses:    Skin: Warm and dry   Lymph nodes:    Neurologic: Alert, conversant, face symmetric, speech fluent      Results Review:     I reviewed the patient's new clinical results.   Results from last 7 days   Lab Units 09/05/24  0454 09/04/24  0407 09/03/24  1817   SODIUM mmol/L 131* 141 144   POTASSIUM mmol/L 4.4 4.5 4.5   CHLORIDE mmol/L 98 110* 112*   CO2 mmol/L 20.0* 21.0* 19.0*   BUN mg/dL 18 14 11   CREATININE mg/dL 0.67* 0.83 0.81   CALCIUM mg/dL 8.4* 8.3* 8.9   BILIRUBIN mg/dL  --  1.4*  --    ALK PHOS U/L  --  66  --    ALT (SGPT) U/L  --  11  --    AST (SGOT) U/L  --  59*  --    GLUCOSE mg/dL 182* 191* 168*     Results from last 7 days   Lab Units 09/05/24  0421 09/04/24  0407 09/03/24  1817   WBC 10*3/mm3 14.16* 12.50* 15.95*   HEMOGLOBIN g/dL 11.1* 11.9* 12.3*   HEMATOCRIT % 33.0* 36.0* 37.4*   PLATELETS 10*3/mm3 98* 118* 117*     Results from last 7 days   Lab Units 09/03/24  1915   PH, ARTERIAL pH units 7.329*   PO2 ART mm Hg 69.8*   PCO2, ARTERIAL mm Hg 40.1   HCO3 ART mmol/L 21.1     No results found for: \"BLOODCX\"  No results found for: \"URINECX\"    I reviewed the patient's new imaging including images and reports.    XR CHEST 1 VW    Date of Exam: 9/5/2024 2:46 AM EDT    Indication: Post-Op Heart Surgery    Comparison: 9/4/2024    Findings:    Cardiomediastinal silhouette is enlarged, stable. There are low lung volumes with interstitial thickening and bibasilar opacities. Small bilateral pleural effusions are again suspected. There is no pneumothorax. Right internal jugular central venous  catheter is stable. " Thoracostomy tubes are stable.   Impression:     Impression:  Stable appearance of the chest with cardiomegaly, low lung volumes, bibasilar opacities and small bilateral pleural effusions.      Electronically Signed: Swati Moreno MD   9/5/2024 7:14 AM EDT       All medications reviewed.   acetaminophen, 650 mg, Oral, Q8H  aspirin, 81 mg, Oral, Daily  atorvastatin, 80 mg, Oral, Nightly  chlorhexidine, 15 mL, Mouth/Throat, Q12H  clopidogrel, 75 mg, Oral, Daily  empagliflozin, 10 mg, Oral, Daily  famotidine, 20 mg, Oral, BID AC  insulin lispro, 2-9 Units, Subcutaneous, 4x Daily AC & at Bedtime  methocarbamol, 500 mg, Oral, Q8H  metoprolol tartrate, 12.5 mg, Oral, Q12H  pharmacy consult - MTM, , Does not apply, Daily  protamine, 50 mg, Intravenous, Once  senna-docusate sodium, 2 tablet, Oral, BID          Assessment & Plan       CAD (coronary artery disease)    Benign essential HTN    Congestive heart failure with left ventricular dysfunction    Type 2 diabetes mellitus    Aortic stenosis    Dyslipidemia    74-year-old gentleman, with hypertension, diabetes, multivessel coronary disease who underwent CABG x 5, aortic valve replacement 9/3.   He did not require blood products.  He is currently in sinus rhythm.  He is off vasopressors.  Postoperative hemoglobin is 11.1.  Platelet count is decreased at 98.  This is most likely secondary to consumption.  He has no evidence of active bleeding.  He remains in sinus rhythm.  He is on room air.      His hemoglobin A1c is 7.5.  He is currently on sliding scale insulin.  Glucose 170-180    He has not voided this morning.  He does not take any medication for prostate.      PLAN:    Aspirin, statin, beta-blocker  Plavix started today  Mobilize    Sliding scale insulin  Jardiance started  Would restart his metformin at discharge    Bladder scan    Telemetry    VTE Prophylaxis: foot pumps    Stress Ulcer Prophylaxis: bret Craig MD  09/05/24  11:52  EDT      Time: Critical care 20 min  I personally provided care to this critically ill patient as documented above.  Critical care time does not include time spent on separately billed procedures.  None of my critical care time was concurrent with other critical care providers.

## 2024-09-05 NOTE — PROGRESS NOTES
Multidisciplinary Rounds    Time: 20min  Patient Name: Hossein Gama  Date of Encounter: 09/05/24 14:40 EDT  MRN: [id number]  Admission date: 9/3/2024      Reason for visit: MDR. RD to continue to follow per protocol.     EMR reviewed   Medication reviewed  Labs reviewed     Additional information obtained during MDR:   Pt requesting solid foods this morning. Intensivist advanced diet during rounds    Current diet: Diet: Cardiac, Diabetic; Healthy Heart (2-3 Na+); Consistent Carbohydrate; Fluid Consistency: Thin (IDDSI 0)  Oral nutrition supplement: N/A    Intervention:  Follow treatment plan  Care plan reviewed    Encourage PO intake on current diet  Next 3 meal orders taken    Follow up:   Per protocol      Kathy Hernandez MS,RD,LD  14:40 EDT

## 2024-09-05 NOTE — PROGRESS NOTES
[facility]  IP Progress Note    HOSPITAL COURSE:  Patient was admitted for severe ischemic cardiomyopathy and peripheral heart disease.  Patient has successful course so far and chest tubes have been pulled      CHIEF COMPLAINTS:  Chest pain and shortness of breath      Subjective   Patient has been feeling much better after the chest tube being pulled.  Patient is having little sore throat.      Objective     Blood pressure 99/62, pulse 84, temperature 98.3 °F (36.8 °C), temperature source Oral, resp. rate 20, SpO2 94%.     Intake/Output Summary (Last 24 hours) at 9/5/2024 0845  Last data filed at 9/5/2024 0600  Gross per 24 hour   Intake 1463.94 ml   Output 1090 ml   Net 373.94 ml       PHYSICAL EXAM:  Constitutional:       General: Not in acute distress.     Appearance: Healthy appearance. Not in distress.     Neck:     JVP:Not elevated     Carotid artery: Normal    Pulmonary:      Effort: Pulmonary effort is normal.      Breath sounds: Normal breath sounds. No wheezing. No rhonchi. No rales.     Cardiovascular:      Normal rate. Regular rhythm. Normal S1. Normal S2.      Murmurs: There is mild systolic murmur.      No gallop. No click. No rub.     Abdominal:      General: Bowel sounds are normal.      Palpations: Abdomen is soft.      Tenderness: There is no abdominal tenderness.    Extremities:     Pulses:Normal radial and pedal pulses     Edema:no edema    MEDICATIONS:    acetaminophen, 650 mg, Oral, Q8H  aspirin, 325 mg, Oral, Daily  atorvastatin, 40 mg, Oral, Nightly  chlorhexidine, 15 mL, Mouth/Throat, Q12H  famotidine, 20 mg, Oral, BID AC  insulin lispro, 2-9 Units, Subcutaneous, 4x Daily AC & at Bedtime  methocarbamol, 500 mg, Oral, Q8H  metoprolol tartrate, 12.5 mg, Oral, Q12H  pharmacy consult - MTM, , Does not apply, Daily  protamine, 50 mg, Intravenous, Once  senna-docusate sodium, 2 tablet, Oral, BID          Results from last 7 days   Lab Units 09/05/24  6749  "  WBC 10*3/mm3 14.16*   HEMOGLOBIN g/dL 11.1*   HEMATOCRIT % 33.0*   PLATELETS 10*3/mm3 98*     Results from last 7 days   Lab Units 09/05/24  0454 09/04/24  0407   SODIUM mmol/L 131* 141   POTASSIUM mmol/L 4.4 4.5   CHLORIDE mmol/L 98 110*   CO2 mmol/L 20.0* 21.0*   BUN mg/dL 18 14   CREATININE mg/dL 0.67* 0.83   CALCIUM mg/dL 8.4* 8.3*   BILIRUBIN mg/dL  --  1.4*   ALK PHOS U/L  --  66   ALT (SGPT) U/L  --  11   AST (SGOT) U/L  --  59*   GLUCOSE mg/dL 182* 191*     Results from last 7 days   Lab Units 09/04/24  0407 09/03/24  1432 09/03/24  0638   INR  1.94* 1.88* 1.16*     Lab Results   Component Value Date    TROPONINT 27 (H) 07/29/2024         Results from last 7 days   Lab Units 09/04/24  0407   CHOLESTEROL mg/dL 58   TRIGLYCERIDES mg/dL 106   HDL CHOL mg/dL 27*   LDL CHOL mg/dL 11         No results found for: \"IRON\", \"FERRITIN\", \"LABIRON\", \"TIBC\"   Hemoglobin A1C   Date Value Ref Range Status   08/23/2024 7.50 (H) 4.80 - 5.60 % Final     Magnesium   Date Value Ref Range Status   09/04/2024 2.0 1.6 - 2.4 mg/dL Final        RESULT REVIEW:    I reviewed the patient's new clinical results.    Tele: Sinus Rythym      ASSESSMENT:     Coronary artery disease status postcoronary artery bypass graft  Aortic valve replacement  Hypertension  Hyperlipidemia  Diabetes    PLAN:     Patient initial presentation was non-ST segment elevation MI.  We will go ahead and decrease aspirin to 81 mg and start him on Plavix 75 mg daily.  We will start him on Jardiance 10 mg daily God only knows if insurance will pay for it once he is discharged.  But according to guidelines he does need it.  Will increase his Lipitor to 80 mg daily.  His blood pressure is running a little low so we will hold on adding other guideline directed medical therapy.  "

## 2024-09-05 NOTE — PROGRESS NOTES
"Enter Query Response Below      Query Response:     Chronic systolic and diastolic heart failure.         If applicable, please update the problem list.   Patient: Hossein Gama        : 1950  Account: [id number]           Admit Date:         How to Respond to this query:       a. Click New Note     b. Answer query within the yellow box.                c. Update the Problem List, if applicable.      If you have any questions about this query contact me at: [email or web address]     :    Patient s/p CABG x 5 and AVR on 9/3.  Per the patient's home medication list, he takes Coreg, Losartan, Spironolactone and Lasix.  Per [facility] document review 24 Echo \"Left ventricular ejection fraction appears to be 36 - 40%.\"  9/3 Per H&P patient with a history of CHF.  9/3 Per Cardiology consult note \"Echo 2020, Bri: EF 55-60%, EF 2024:30%-35%.\"    Please clarify the type of heart failure treated/monitored:    - Chronic systolic heart failure.  - Chronic systolic and diastolic heart failure.  - Other_______________.    By submitting this query, we are merely seeking further clarification of documentation to accurately reflect all conditions that you are monitoring, evaluating, treating or that extend the hospitalization or utilize additional resources of care. Please utilize your independent clinical judgment when addressing the question(s) above.     This query and your response, once completed, will be entered into the legal medical record.    Sincerely,  Lorena Smith RN  Clinical Documentation Integrity Program     "

## 2024-09-05 NOTE — PLAN OF CARE
Goal Outcome Evaluation:  Plan of Care Reviewed With: patient        Progress: improving  Outcome Evaluation: Pt doing well. MTs and wires dc'd, art line dc'd, ambulated 220 feet twice, on room air, saline locked, VSS. Orders to tele.

## 2024-09-05 NOTE — THERAPY TREATMENT NOTE
Patient Name: Hossein Gama  : 1950    MRN: [id number]                              Today's Date: 2024       Admit Date: 9/3/2024    Visit Dx:     ICD-10-CM ICD-9-CM   1. Coronary artery disease involving native coronary artery of native heart with angina pectoris  I25.119 414.01     413.9     Patient Active Problem List   Diagnosis    CAD (coronary artery disease)    Benign essential HTN    Congestive heart failure with left ventricular dysfunction    Type 2 diabetes mellitus    Aortic stenosis    Dyslipidemia     Past Medical History:   Diagnosis Date    Bladder infection     h/o    CAD (coronary artery disease) 2024    Chest pain     CHF (congestive heart failure)     Deep vein thrombosis     LEFT LEG    Diabetes     GERD (gastroesophageal reflux disease)     Glaucoma     mild    Headache     Heart failure 2024    Hyperlipidemia     Hypertension     Joint pain     Light headed     Myocardial infarction     98    Pneumonia     Psoriasis     bilat knee at times-  topical ointment prn    Tinnitus      Past Surgical History:   Procedure Laterality Date    CHOLECYSTECTOMY      CORONARY ARTERY BYPASS GRAFT WITH AORTIC VALVE REPAIR/REPLACEMENT N/A 9/3/2024    Procedure: MEDIAN STERNOTOMY, CORONARY ARTERY BYPASS GRAFTING X5 UTILIZING THE LEFT INTERNAL MAMMERY ARTERY, EVH OF THE GREATER RIGHT SAPHENOUS VEIN, AORTIC VALVE REPLACEMENT, EXPLORATION OF THE LEFT RADIAL ARTERY AND YOLA PER ANESTHESIA;  Surgeon: Frankie Michaud MD;  Location: [facility];  Service: Cardiothoracic;  Laterality: N/A;    CORONARY STENT PLACEMENT      x1    CYST REMOVAL Left     left shoulder      General Information       Row Name 24 1237          Physical Therapy Time and Intention    Document Type therapy note (daily note) (P)   -TM     Mode of Treatment physical therapy (P)   -TM       Row Name 24 1237          General Information    Patient Profile Reviewed yes (P)   -TM     Existing  Precautions/Restrictions cardiac;sternal;fall (P)   -TM       Row Name 09/05/24 1237          Cognition    Orientation Status (Cognition) oriented x 4 (P)   -TM       Row Name 09/05/24 1237          Safety Issues, Functional Mobility    Safety Issues Affecting Function (Mobility) insight into deficits/self-awareness;safety precaution awareness;awareness of need for assistance (P)   -TM     Impairments Affecting Function (Mobility) balance;endurance/activity tolerance;pain;strength (P)   -TM     Comment, Safety Issues/Impairments (Mobility) VCs for compliance with sternal precautions before STS. (P)   -TM               User Key  (r) = Recorded By, (t) = Taken By, (c) = Cosigned By      Initials Name Provider Type    TM Stacy Gonsales, PT Student PT Student                   Mobility       Row Name 09/05/24 1239          Bed Mobility    Bed Mobility sit-supine (P)   -TM     Sit-Supine [place] (Bed Mobility) verbal cues;moderate assist (50% patient effort);2 person assist (P)   -TM     Comment, (Bed Mobility) VCs for sequencing. Pt mod Ax2 to ensure compliance with sternal precautions from EOB to supine. (P)   -TM       Row Name 09/05/24 1239          Sit-Stand Transfer    Sit-Stand [place] (Transfers) verbal cues;contact guard;1 person assist (P)   -TM     Assistive Device (Sit-Stand Transfers) -- (P)   BUE support on tripod monitor once standing  -TM     Comment, (Sit-Stand Transfer) VCs for sequencing and hand placement to remain compliant with sternal precautions. (P)   -TM       Row Name 09/05/24 1239          Gait/Stairs (Locomotion)    [place] Level (Gait) verbal cues;contact guard;1 person assist (P)   -TM     Assistive Device (Gait) other (see comments) (P)   tripod monitor  -TM     Distance in Feet (Gait) 220 (P)   -TM     Deviations/Abnormal Patterns (Gait) bilateral deviations;alysa decreased;gait speed decreased;stride length decreased (P)   -TM     Bilateral Gait Deviations heel strike  decreased (P)   -TM     Comment, (Gait/Stairs) Pt demo'd step through gait pattern with dec gait speed and cueing to keep tripod monitor at close distance. Pt ambulated 220 ft with CGA x1 and tripod monitor. No rest breaks required. (P)   -TM               User Key  (r) = Recorded By, (t) = Taken By, (c) = Cosigned By      Initials Name Provider Type     Stacy Gonsales, PT Student PT Student                   Obj/Interventions       Row Name 09/05/24 1244          Balance    Balance Assessment sitting static balance;sitting dynamic balance;sit to stand dynamic balance;standing static balance;standing dynamic balance (P)   -TM     Static Sitting Balance standby assist;verbal cues (P)   -TM     Dynamic Sitting Balance verbal cues;standby assist (P)   -TM     Position, Sitting Balance sitting in chair (P)   -TM     Static Standing Balance verbal cues;contact guard;1-person assist (P)   -TM     Dynamic Standing Balance verbal cues;contact guard;1-person assist (P)   -TM     Position/Device Used, Standing Balance supported;other (see comments) (P)   tripod monitor  -TM               User Key  (r) = Recorded By, (t) = Taken By, (c) = Cosigned By      Initials Name Provider Type     Stacy Gonsales, PT Student PT Student                   Goals/Plan    No documentation.                  Clinical Impression       Row Name 09/05/24 1245          Pain    Pretreatment Pain Rating 0/10 - no pain (P)   -TM     Posttreatment Pain Rating 0/10 - no pain (P)   -TM       Row Name 09/05/24 1245          Plan of Care Review    Plan of Care Reviewed With patient;spouse (P)   -TM     Progress improving (P)   -TM     Outcome Evaluation Pt presented with dec activity tolerance, endurance, and functional mobility. Pt completed STS with CGA x1 and ambulated 220 ft with CGA x1 using tripod monitor and no rest breaks. Pt continue to require skilled IP PT services to return to OF. Rec d/c home with assist once medically stable. (P)    -TM       Row Name 09/05/24 1245          Vital Signs    Pre Systolic BP Rehab 97 (P)   -TM     Pre Treatment Diastolic BP 55 (P)   -TM     Post Systolic BP Rehab 113 (P)   -TM     Post Treatment Diastolic BP 68 (P)   -TM     Pretreatment Heart Rate (beats/min) 87 (P)   -TM     Posttreatment Heart Rate (beats/min) 90 (P)   -TM     Pre SpO2 (%) 96 (P)   -TM     O2 Delivery Pre Treatment room air (P)   -TM     O2 Delivery Intra Treatment room air (P)   -TM     Post SpO2 (%) 97 (P)   -TM     O2 Delivery Post Treatment room air (P)   -TM     Pre Patient Position Sitting (P)   -TM     Post Patient Position Supine (P)   -TM       Row Name 09/05/24 1245          Positioning and Restraints    Pre-Treatment Position sitting in chair/recliner (P)   -TM     Post Treatment Position bed (P)   -TM     In Bed notified nsg;fowlers;call light within reach;encouraged to call for assist;with family/caregiver;with nsg;RUE elevated;LUE elevated (P)   -TM     In Chair sitting (P)   -TM               User Key  (r) = Recorded By, (t) = Taken By, (c) = Cosigned By      Initials Name Provider Type    TM Stacy Gonsales, PT Student PT Student                   Outcome Measures       Row Name 09/05/24 1249          How much help from another person do you currently need...    Turning from your back to your side while in flat bed without using bedrails? 3 (P)   -TM     Moving from lying on back to sitting on the side of a flat bed without bedrails? 3 (P)   -TM     Moving to and from a bed to a chair (including a wheelchair)? 3 (P)   -TM     Standing up from a chair using your arms (e.g., wheelchair, bedside chair)? 3 (P)   -TM     Climbing 3-5 steps with a railing? 2 (P)   -TM     To walk in hospital room? 3 (P)   -TM     AM-PAC 6 Clicks Score (PT) 17 (P)   -TM     Highest Level of Mobility Goal 5 --> Static standing (P)   -TM       Row Name 09/05/24 1249          Functional Assessment    Outcome Measure Options AM-PAC 6 Clicks Basic Mobility  (PT) (P)   -TM               User Key  (r) = Recorded By, (t) = Taken By, (c) = Cosigned By      Initials Name Provider Type    TM Stacy Gonsales, PT Student PT Student                                 Physical Therapy Education       Title: PT OT SLP Therapies (In Progress)       Topic: Physical Therapy (In Progress)       Point: Mobility training (In Progress)       Learning Progress Summary             Patient Acceptance, E, NR by TM at 9/5/2024 1249    Acceptance, E, VU by SHIVA at 9/4/2024 1755    Acceptance, E, NR by KG at 9/4/2024 1042   Family Acceptance, E, VU by SHIVA at 9/4/2024 1755                         Point: Home exercise program (Not Started)       Learner Progress:  Not documented in this visit.              Point: Body mechanics (In Progress)       Learning Progress Summary             Patient Acceptance, E, NR by TM at 9/5/2024 1249    Acceptance, E, VU by SHIVA at 9/4/2024 1755    Acceptance, E, NR by KG at 9/4/2024 1042   Family Acceptance, E, VU by SHIVA at 9/4/2024 1755                         Point: Precautions (In Progress)       Learning Progress Summary             Patient Acceptance, E, NR by TM at 9/5/2024 1249    Acceptance, E, NR by KG at 9/4/2024 1042                                         User Key       Initials Effective Dates Name Provider Type Discipline     06/16/21 -  Gaston Su, RN Registered Nurse Nurse     05/22/20 -  Mamie Boucher, PT Physical Therapist PT     08/13/24 -  Stacy Gonsales, PT Student PT Student PT                  PT Recommendation and Plan     Plan of Care Reviewed With: (P) patient, spouse  Progress: (P) improving  Outcome Evaluation: (P) Pt presented with dec activity tolerance, endurance, and functional mobility. Pt completed STS with CGA x1 and ambulated 220 ft with CGA x1 using tripod monitor and no rest breaks. Pt continue to require skilled IP PT services to return to PLOF. Rec d/c home with assist once medically stable.     Time  Calculation:         PT Charges       Row Name 09/05/24 1251             Time Calculation    Start Time 1020 (P)   -TM      PT Received On 09/05/24 (P)   -TM      PT Goal Re-Cert Due Date 09/14/24 (P)   -TM         Timed Charges    15847 - PT Therapeutic Activity Minutes 11 (P)   -TM         Total Minutes    Timed Charges Total Minutes 11 (P)   -TM       Total Minutes 11 (P)   -TM                User Key  (r) = Recorded By, (t) = Taken By, (c) = Cosigned By      Initials Name Provider Type    TM Stacy Gonsales, PT Student PT Student                  Therapy Charges for Today       Code Description Service Date Service Provider Modifiers Qty    80094824819 HC PT THERAPEUTIC ACT EA 15 MIN 9/5/2024 Stacy Gonsales, PT Student GP 1            PT G-Codes  Outcome Measure Options: (P) AM-PAC 6 Clicks Basic Mobility (PT)  AM-PAC 6 Clicks Score (PT): (P) 17  PT Discharge Summary  Anticipated Discharge Disposition (PT): (P) home with assist    Stacy Gonsales PT Student  9/5/2024

## 2024-09-06 ENCOUNTER — APPOINTMENT (OUTPATIENT)
Dept: CARDIOLOGY | Facility: HOSPITAL | Age: 74
DRG: 220 | End: 2024-09-06

## 2024-09-06 ENCOUNTER — APPOINTMENT (OUTPATIENT)
Dept: GENERAL RADIOLOGY | Facility: HOSPITAL | Age: 74
DRG: 220 | End: 2024-09-06

## 2024-09-06 LAB
ANION GAP SERPL CALCULATED.3IONS-SCNC: 12 MMOL/L (ref 5–15)
BH CV ECHO MEAS - EDV(CUBED): 79.5 ML
BH CV ECHO MEAS - EDV(MOD-SP4): 174 ML
BH CV ECHO MEAS - EF(MOD-SP4): 33.3 %
BH CV ECHO MEAS - ESV(CUBED): 50.7 ML
BH CV ECHO MEAS - ESV(MOD-SP4): 116 ML
BH CV ECHO MEAS - FS: 14 %
BH CV ECHO MEAS - IVS/LVPW: 1.2 CM
BH CV ECHO MEAS - IVSD: 1.2 CM
BH CV ECHO MEAS - LA DIMENSION: 4.2 CM
BH CV ECHO MEAS - LV MASS(C)D: 162.9 GRAMS
BH CV ECHO MEAS - LVIDD: 4.3 CM
BH CV ECHO MEAS - LVIDS: 3.7 CM
BH CV ECHO MEAS - LVPWD: 1 CM
BH CV ECHO MEAS - SV(MOD-SP4): 58 ML
BUN SERPL-MCNC: 17 MG/DL (ref 8–23)
BUN/CREAT SERPL: 26.2 (ref 7–25)
CALCIUM SPEC-SCNC: 8.9 MG/DL (ref 8.6–10.5)
CHLORIDE SERPL-SCNC: 99 MMOL/L (ref 98–107)
CO2 SERPL-SCNC: 21 MMOL/L (ref 22–29)
CREAT SERPL-MCNC: 0.65 MG/DL (ref 0.76–1.27)
DEPRECATED RDW RBC AUTO: 45.8 FL (ref 37–54)
EGFRCR SERPLBLD CKD-EPI 2021: 100.1 ML/MIN/1.73
ERYTHROCYTE [DISTWIDTH] IN BLOOD BY AUTOMATED COUNT: 14.3 % (ref 12.3–15.4)
GLUCOSE BLDC GLUCOMTR-MCNC: 116 MG/DL (ref 70–130)
GLUCOSE BLDC GLUCOMTR-MCNC: 80 MG/DL (ref 70–130)
GLUCOSE BLDC GLUCOMTR-MCNC: 95 MG/DL (ref 70–130)
GLUCOSE BLDC GLUCOMTR-MCNC: 96 MG/DL (ref 70–130)
GLUCOSE SERPL-MCNC: 91 MG/DL (ref 65–99)
HCT VFR BLD AUTO: 33.1 % (ref 37.5–51)
HGB BLD-MCNC: 11.1 G/DL (ref 13–17.7)
MCH RBC QN AUTO: 29.4 PG (ref 26.6–33)
MCHC RBC AUTO-ENTMCNC: 33.5 G/DL (ref 31.5–35.7)
MCV RBC AUTO: 87.6 FL (ref 79–97)
PLATELET # BLD AUTO: 97 10*3/MM3 (ref 140–450)
PMV BLD AUTO: 10.9 FL (ref 6–12)
POTASSIUM SERPL-SCNC: 3.9 MMOL/L (ref 3.5–5.2)
RBC # BLD AUTO: 3.78 10*6/MM3 (ref 4.14–5.8)
SODIUM SERPL-SCNC: 132 MMOL/L (ref 136–145)
WBC NRBC COR # BLD AUTO: 11.7 10*3/MM3 (ref 3.4–10.8)

## 2024-09-06 PROCEDURE — 93308 TTE F-UP OR LMTD: CPT

## 2024-09-06 PROCEDURE — 80048 BASIC METABOLIC PNL TOTAL CA: CPT | Performed by: PHYSICIAN ASSISTANT

## 2024-09-06 PROCEDURE — 93321 DOPPLER ECHO F-UP/LMTD STD: CPT | Performed by: INTERNAL MEDICINE

## 2024-09-06 PROCEDURE — 25010000002 SULFUR HEXAFLUORIDE MICROSPH 60.7-25 MG RECONSTITUTED SUSPENSION: Performed by: INTERNAL MEDICINE

## 2024-09-06 PROCEDURE — 99232 SBSQ HOSP IP/OBS MODERATE 35: CPT | Performed by: INTERNAL MEDICINE

## 2024-09-06 PROCEDURE — 71045 X-RAY EXAM CHEST 1 VIEW: CPT

## 2024-09-06 PROCEDURE — 93005 ELECTROCARDIOGRAM TRACING: CPT | Performed by: INTERNAL MEDICINE

## 2024-09-06 PROCEDURE — 97530 THERAPEUTIC ACTIVITIES: CPT

## 2024-09-06 PROCEDURE — 93325 DOPPLER ECHO COLOR FLOW MAPG: CPT

## 2024-09-06 PROCEDURE — 93321 DOPPLER ECHO F-UP/LMTD STD: CPT

## 2024-09-06 PROCEDURE — 93010 ELECTROCARDIOGRAM REPORT: CPT | Performed by: INTERNAL MEDICINE

## 2024-09-06 PROCEDURE — 93325 DOPPLER ECHO COLOR FLOW MAPG: CPT | Performed by: INTERNAL MEDICINE

## 2024-09-06 PROCEDURE — 85027 COMPLETE CBC AUTOMATED: CPT | Performed by: PHYSICIAN ASSISTANT

## 2024-09-06 PROCEDURE — 93308 TTE F-UP OR LMTD: CPT | Performed by: INTERNAL MEDICINE

## 2024-09-06 PROCEDURE — 82948 REAGENT STRIP/BLOOD GLUCOSE: CPT

## 2024-09-06 RX ORDER — POTASSIUM CHLORIDE 1500 MG/1
20 TABLET, EXTENDED RELEASE ORAL ONCE
Status: COMPLETED | OUTPATIENT
Start: 2024-09-06 | End: 2024-09-06

## 2024-09-06 RX ORDER — ACETAMINOPHEN 325 MG/1
650 TABLET ORAL EVERY 6 HOURS PRN
Status: DISCONTINUED | OUTPATIENT
Start: 2024-09-06 | End: 2024-09-09 | Stop reason: HOSPADM

## 2024-09-06 RX ADMIN — ATORVASTATIN CALCIUM 80 MG: 40 TABLET, FILM COATED ORAL at 21:34

## 2024-09-06 RX ADMIN — METHOCARBAMOL 500 MG: 500 TABLET ORAL at 06:24

## 2024-09-06 RX ADMIN — EMPAGLIFLOZIN 10 MG: 10 TABLET, FILM COATED ORAL at 08:20

## 2024-09-06 RX ADMIN — ASPIRIN 81 MG: 81 TABLET, COATED ORAL at 08:20

## 2024-09-06 RX ADMIN — FAMOTIDINE 20 MG: 20 TABLET, FILM COATED ORAL at 06:24

## 2024-09-06 RX ADMIN — CLOPIDOGREL BISULFATE 75 MG: 75 TABLET ORAL at 08:20

## 2024-09-06 RX ADMIN — FAMOTIDINE 20 MG: 20 TABLET, FILM COATED ORAL at 17:52

## 2024-09-06 RX ADMIN — POTASSIUM CHLORIDE 20 MEQ: 1500 TABLET, EXTENDED RELEASE ORAL at 06:24

## 2024-09-06 RX ADMIN — Medication 12.5 MG: at 21:35

## 2024-09-06 RX ADMIN — Medication 12.5 MG: at 08:20

## 2024-09-06 RX ADMIN — SULFUR HEXAFLUORIDE 3 ML: KIT at 11:58

## 2024-09-06 NOTE — PLAN OF CARE
Goal Outcome Evaluation:  Plan of Care Reviewed With: (P) patient, spouse        Progress: (P) improving  Outcome Evaluation: (P) Pt cont to present with dec activity tolerance, endurance, and functional mobility. Pt completed STS with CGA and ambulated 110 ft with CGA x1 and B UE support on tripod monitor. Further distance limited d/t pt c/o upset stomach and diarrhea. Pt still requires skilled IP PT services to return to PLOF. Rec d/c home with assist once medically stable.      Anticipated Discharge Disposition (PT): (P) home with assist

## 2024-09-06 NOTE — PLAN OF CARE
Goal Outcome Evaluation:  Plan of Care Reviewed With: patient           Outcome Evaluation: vss. stable transfer from , ambulating well, voiding well, no pain

## 2024-09-06 NOTE — PROGRESS NOTES
Cardiothoracic Surgery Progress Note      POD # 3 s/p CABG x 5, AVR      LOS: 3 days      Subjective:  Resting comfortably in chair     Objective:  Vital Signs  Temp:  [97.8 °F (36.6 °C)-99.1 °F (37.3 °C)] 97.9 °F (36.6 °C)  Heart Rate:  [] 91  Resp:  [16-22] 18  BP: ()/(52-83) 108/68    Physical Exam:   General Appearance: alert, appears stated age and cooperative   Lungs: clear to auscultation, respirations regular, respirations even, and respirations unlabored   Heart: regular rhythm & normal rate, normal S1, S2, no murmur, no gallop, no rub, and no click   Skin: Incision c/d/i    Output by Drain (mL) 09/05/24 0701 - 09/05/24 1900 09/05/24 1901 - 09/06/24 0700 09/06/24 0701 - 09/06/24 0724 Range Total   Patient has no LDAs of requested type attached.        Results:    Results from last 7 days   Lab Units 09/06/24  0444   WBC 10*3/mm3 11.70*   HEMOGLOBIN g/dL 11.1*   HEMATOCRIT % 33.1*   PLATELETS 10*3/mm3 97*     Results from last 7 days   Lab Units 09/06/24  0444   SODIUM mmol/L 132*   POTASSIUM mmol/L 3.9   CHLORIDE mmol/L 99   CO2 mmol/L 21.0*   BUN mg/dL 17   CREATININE mg/dL 0.65*   GLUCOSE mg/dL 91   CALCIUM mg/dL 8.9       Assessment:  POD # 3 s/p CABG x 5, AVR     Plan:  Pulmonary toilet  Ambulate  ASA, statin, BB  Awaiting transfer to telemetry    Frankie Michaud MD  09/06/24  07:24 EDT

## 2024-09-06 NOTE — PROGRESS NOTES
"[facility]  IP Progress Note    PROBLEM LIST:  CARDIAC   Coronary Artery Disease:   Remote stents in 1998   Stress echo 11/2020: apical,septal and mid anteroseptal hypokinesis   [facility] 7/2024: Severe diffusely disease   CABG x 5, 9/3/2024: LIMA-LAD, VG-PDA, OM 2, OM 3, D2     Myocardium:   Echo 11/2020, Bri: EF 55-60%   EF 7/16/2024:30%-35%     Valvular:   Moderate AS   S/p 23mm Inspiris tissue valve (9/3/2024)     Electrical:   NSR     Pericardium:   Normal     CARDIAC RISK FACTORS   Hypertension   Diabetes   Dyslipidemia     NON-CARDIAC   None     SURGERIES   CABG x 5   AVR       HOSPITAL COURSE:  Patient underwent successful revascularization with aortic valve repair placement.  He has been doing fine.      CHIEF COMPLAINTS:  Chest pain with shortness of breath      Subjective   Patient is feeling a little fatigue and tired      Objective     Blood pressure 98/60, pulse 78, temperature 97.3 °F (36.3 °C), temperature source Axillary, resp. rate 16, height 172.7 cm (67.99\"), weight 93.6 kg (206 lb 5.6 oz), SpO2 96%.     Intake/Output Summary (Last 24 hours) at 9/6/2024 1034  Last data filed at 9/6/2024 1028  Gross per 24 hour   Intake 960 ml   Output 2350 ml   Net -1390 ml       PHYSICAL EXAM:  Constitutional:       General: Not in acute distress.     Appearance: Healthy appearance. Not in distress.     Neck:     JVP:Not elevated     Carotid artery: Normal    Pulmonary:      Effort: Pulmonary effort is normal.      Breath sounds: Normal breath sounds. No wheezing. No rhonchi. No rales.     Cardiovascular:      Normal rate. Regular rhythm. Normal S1. Normal S2.      Murmurs: There is mild systolic murmur.      No gallop. No click. No rub.     Abdominal:      General: Bowel sounds are normal.      Palpations: Abdomen is soft.      Tenderness: There is no abdominal tenderness.    Extremities:     Pulses:Normal radial and pedal pulses     Edema:no " "edema    MEDICATIONS:    aspirin, 81 mg, Oral, Daily  atorvastatin, 80 mg, Oral, Nightly  clopidogrel, 75 mg, Oral, Daily  empagliflozin, 10 mg, Oral, Daily  famotidine, 20 mg, Oral, BID AC  insulin lispro, 2-9 Units, Subcutaneous, 4x Daily AC & at Bedtime  metoprolol tartrate, 12.5 mg, Oral, Q12H  pharmacy consult - [place], , Does not apply, Daily  protamine, 50 mg, Intravenous, Once  senna-docusate sodium, 2 tablet, Oral, BID          Results from last 7 days   Lab Units 09/06/24  0444   WBC 10*3/mm3 11.70*   HEMOGLOBIN g/dL 11.1*   HEMATOCRIT % 33.1*   PLATELETS 10*3/mm3 97*     Results from last 7 days   Lab Units 09/06/24  0444 09/05/24  0454 09/04/24  0407   SODIUM mmol/L 132*   < > 141   POTASSIUM mmol/L 3.9   < > 4.5   CHLORIDE mmol/L 99   < > 110*   CO2 mmol/L 21.0*   < > 21.0*   BUN mg/dL 17   < > 14   CREATININE mg/dL 0.65*   < > 0.83   CALCIUM mg/dL 8.9   < > 8.3*   BILIRUBIN mg/dL  --   --  1.4*   ALK PHOS U/L  --   --  66   ALT (SGPT) U/L  --   --  11   AST (SGOT) U/L  --   --  59*   GLUCOSE mg/dL 91   < > 191*    < > = values in this interval not displayed.     Results from last 7 days   Lab Units 09/04/24  0407 09/03/24  1432 09/03/24  0638   INR  1.94* 1.88* 1.16*     Lab Results   Component Value Date    TROPONINT 27 (H) 07/29/2024         Results from last 7 days   Lab Units 09/04/24  0407   CHOLESTEROL mg/dL 58   TRIGLYCERIDES mg/dL 106   HDL CHOL mg/dL 27*   LDL CHOL mg/dL 11         No results found for: \"IRON\", \"FERRITIN\", \"LABIRON\", \"TIBC\"   Hemoglobin A1C   Date Value Ref Range Status   08/23/2024 7.50 (H) 4.80 - 5.60 % Final     Magnesium   Date Value Ref Range Status   09/04/2024 2.0 1.6 - 2.4 mg/dL Final        RESULT REVIEW:    I reviewed the patient's new clinical results.    Tele: Sinus Rythym      ASSESSMENT:     Coronary artery disease  Hypertension  Hyperlipidemia  Aortic valve replacement    PLAN:     Patient is on aspirin and Plavix because of nasal presentation of non-STEMI.  We " have started patient on Jardiance.  We will add ACE inhibitor soon.  Will get an echo today 2D just to see if her EF has improved or he need LifeVest before he get discharged.

## 2024-09-06 NOTE — PROGRESS NOTES
"Critical Care Note     LOS: 3 days   Patient Care Team:  Charlie Turner MD as PCP - General (Family Medicine)  Amee Jacobs MD as Cardiologist (Cardiology)    Chief Complaint/Reason for visit:      CABGx5 9/3/24  Diabetes mellitus type 2  Dyslipidemia  Hypertension      Subjective         Interval History:     Patient underwent CABG x 5, LIMA to LAD, saphenous vein to PDA, OM 2, OM 3, D2, aortic valve replacement, tissue, 9/3.  Extubated night of surgery and is currently on RA.  MT tubes removed September 5.  He remains in sinus rhythm.  He is ambulating with therapy.  Norepinephrine has been off since 1030 yesterday morning.  Voiding without a Issa catheter.  Some incisional pain but it is improving.  Denies nausea or vomiting.    Review of Systems:    All systems were reviewed and negative except as noted in subjective.    Medical history, surgical history, social history, family history reviewed    Objective     Intake/Output:    Intake/Output Summary (Last 24 hours) at 9/6/2024 1031  Last data filed at 9/6/2024 1028  Gross per 24 hour   Intake 960 ml   Output 2350 ml   Net -1390 ml       Nutrition:  Diet: Cardiac, Diabetic; Healthy Heart (2-3 Na+); Consistent Carbohydrate; Fluid Consistency: Thin (IDDSI 0)    Infusions:         Mechanical Ventilator Settings:            Resp Rate (Set): 14  Pressure Support (cm H2O): 10 cm H20  FiO2 (%): 50 %  PEEP/CPAP (cm H2O): 5 cm H20    Minute Ventilation (L/min) (Obs): 8.45 L/min  Resp Rate (Observed) Vent: 26  I:E Ratio (Set): 1:2.94  I:E Ratio (Obs): 3.13:1    PIP Observed (cm H2O): 16 cm H2O       Telemetry: Sinus rhythm             Vital Signs  Blood pressure 98/60, pulse 78, temperature 97.3 °F (36.3 °C), temperature source Axillary, resp. rate 16, height 172.7 cm (67.99\"), weight 93.6 kg (206 lb 5.6 oz), SpO2 96%.    Physical Exam:  General Appearance:  Older gentleman, sitting in the chair in no distress   Head:  Atraumatic   Eyes:          Conjunctiva " "pink   Ears:     Throat: Oral mucosa moist   Neck: Right IJ line has been removed   Back:      Lungs:   Sternotomy incision intact. Breath sounds are bilateral without wheeze or rhonchi.  Improved breath sounds at the bases.    Heart:  Heart sounds are distant, regular, S1, S2 auscultated, no appreciable rub   Abdomen:   Bowel sounds present , nondistended, soft   Rectal:   Deferred   Extremities: Right lower extremity incisions intact without erythema or drainage   Pulses:    Skin: Warm and dry   Lymph nodes:    Neurologic: Alert, conversant, face symmetric, speech fluent      Results Review:     I reviewed the patient's new clinical results.   Results from last 7 days   Lab Units 09/06/24 0444 09/05/24  0454 09/04/24  0407   SODIUM mmol/L 132* 131* 141   POTASSIUM mmol/L 3.9 4.4 4.5   CHLORIDE mmol/L 99 98 110*   CO2 mmol/L 21.0* 20.0* 21.0*   BUN mg/dL 17 18 14   CREATININE mg/dL 0.65* 0.67* 0.83   CALCIUM mg/dL 8.9 8.4* 8.3*   BILIRUBIN mg/dL  --   --  1.4*   ALK PHOS U/L  --   --  66   ALT (SGPT) U/L  --   --  11   AST (SGOT) U/L  --   --  59*   GLUCOSE mg/dL 91 182* 191*     Results from last 7 days   Lab Units 09/06/24 0444 09/05/24  0421 09/04/24  0407   WBC 10*3/mm3 11.70* 14.16* 12.50*   HEMOGLOBIN g/dL 11.1* 11.1* 11.9*   HEMATOCRIT % 33.1* 33.0* 36.0*   PLATELETS 10*3/mm3 97* 98* 118*     Results from last 7 days   Lab Units 09/03/24  1915   PH, ARTERIAL pH units 7.329*   PO2 ART mm Hg 69.8*   PCO2, ARTERIAL mm Hg 40.1   HCO3 ART mmol/L 21.1     No results found for: \"BLOODCX\"  No results found for: \"URINECX\"    I reviewed the patient's new imaging including images and reports.    I reviewed his chest x-ray and I do not appreciate a pneumothorax.  Basilar atelectasis has improved.    XR CHEST 1 VW    Date of Exam: 9/6/2024 3:07 AM EDT    Indication: s/p chest tube removal    Comparison: Chest radiograph 9/5/2024.    Findings:  Removal of right IJ approach central venous catheter and chest tubes. " Sternotomy. Cardiomediastinal silhouette is unchanged. Low lung volumes with bibasilar subsegmental atelectasis and trace bilateral pleural effusions, slightly decreased. Questionable  minimal/trace right pneumothorax. Osseous structures are unchanged.   Impression:     Impression:  Questionable minimal/trace right pneumothorax. Slightly improved trace bilateral pleural effusions and basilar atelectasis.      Electronically Signed: Abhinav Dubose MD   9/6/2024 7:45 AM EDT       All medications reviewed.   aspirin, 81 mg, Oral, Daily  atorvastatin, 80 mg, Oral, Nightly  clopidogrel, 75 mg, Oral, Daily  empagliflozin, 10 mg, Oral, Daily  famotidine, 20 mg, Oral, BID AC  insulin lispro, 2-9 Units, Subcutaneous, 4x Daily AC & at Bedtime  metoprolol tartrate, 12.5 mg, Oral, Q12H  pharmacy consult - MTM, , Does not apply, Daily  protamine, 50 mg, Intravenous, Once  senna-docusate sodium, 2 tablet, Oral, BID          Assessment & Plan       CAD s/p CABG x 5, AVR (23 mm Inspiris) 9/3/24    Benign essential HTN    Congestive heart failure with left ventricular dysfunction    Type 2 diabetes mellitus    Aortic stenosis    Dyslipidemia    74-year-old gentleman, with hypertension, diabetes, multivessel coronary disease who underwent CABG x 5, aortic valve replacement 9/3.   He did not require blood products.  He is currently in sinus rhythm.  He is off vasopressors.  Postoperative hemoglobin is 11.1.  Platelet count is decreased at 97.  This is most likely secondary to consumption.  He has no evidence of active bleeding.  He remains in sinus rhythm.  He is on room air.  Mediastinal tubes removed September 5.  Off vasopressors for 24 hours.      His hemoglobin A1c is 7.5.  He is currently on sliding scale insulin.  Glucose 120-180.  He was taking metformin  mg daily as an outpatient.  Renal function is normal with a creatinine of 0.65.  He can resume metformin at discharge          PLAN:    Aspirin, statin,  beta-blocker  Plavix started 9/5  Jardiance 10 mg daily initiated  Mobilize    Sliding scale insulin  Would restart his metformin at discharge      Telemetry versus home    VTE Prophylaxis: foot pumps    Stress Ulcer Prophylaxis: bret Craig MD  09/06/24  10:31 EDT      Time: Critical care 20 min  I personally provided care to this critically ill patient as documented above.  Critical care time does not include time spent on separately billed procedures.  None of my critical care time was concurrent with other critical care providers.

## 2024-09-06 NOTE — THERAPY TREATMENT NOTE
Patient Name: Hossein Gama  : 1950    MRN: [id number]                              Today's Date: 2024       Admit Date: 9/3/2024    Visit Dx:     ICD-10-CM ICD-9-CM   1. Coronary artery disease involving native coronary artery of native heart with angina pectoris  I25.119 414.01     413.9     Patient Active Problem List   Diagnosis    CAD s/p CABG x 5, AVR (23 mm Inspiris) 9/3/24    Benign essential HTN    Congestive heart failure with left ventricular dysfunction    Type 2 diabetes mellitus    Aortic stenosis    Dyslipidemia     Past Medical History:   Diagnosis Date    Bladder infection     h/o    CAD (coronary artery disease) 2024    Chest pain     CHF (congestive heart failure)     Deep vein thrombosis     LEFT LEG    Diabetes     GERD (gastroesophageal reflux disease)     Glaucoma     mild    Headache     Heart failure 2024    Hyperlipidemia     Hypertension     Joint pain     Light headed     Myocardial infarction     98    Pneumonia     Psoriasis     bilat knee at times-  topical ointment prn    Tinnitus      Past Surgical History:   Procedure Laterality Date    CHOLECYSTECTOMY      CORONARY ARTERY BYPASS GRAFT WITH AORTIC VALVE REPAIR/REPLACEMENT N/A 9/3/2024    Procedure: MEDIAN STERNOTOMY, CORONARY ARTERY BYPASS GRAFTING X5 UTILIZING THE LEFT INTERNAL MAMMERY ARTERY, EVH OF THE GREATER RIGHT SAPHENOUS VEIN, AORTIC VALVE REPLACEMENT, EXPLORATION OF THE LEFT RADIAL ARTERY AND YOLA PER ANESTHESIA;  Surgeon: Frankie Michaud MD;  Location: [facility];  Service: Cardiothoracic;  Laterality: N/A;    CORONARY STENT PLACEMENT      x1    CYST REMOVAL Left     left shoulder      General Information       Row Name 24 1054          Physical Therapy Time and Intention    Document Type therapy note (daily note) (P)   -TM     Mode of Treatment physical therapy (P)   -TM       Row Name 24 1057          General Information    Patient Profile Reviewed yes (P)   -TM     Existing  Precautions/Restrictions cardiac;sternal;fall (P)   -TM       Row Name 09/06/24 1054          Cognition    Orientation Status (Cognition) oriented x 4 (P)   -TM       Row Name 09/06/24 1054          Safety Issues, Functional Mobility    Safety Issues Affecting Function (Mobility) sequencing abilities (P)   -TM     Impairments Affecting Function (Mobility) balance;endurance/activity tolerance;pain;strength (P)   -TM     Comment, Safety Issues/Impairments (Mobility) VCs for reminder of sternal precautions before transferring. (P)   -TM               User Key  (r) = Recorded By, (t) = Taken By, (c) = Cosigned By      Initials Name Provider Type    TM Stacy Gonsales, PT Student PT Student                   Mobility       Row Name 09/06/24 1055          Sit-Stand Transfer    Sit-Stand [place] (Transfers) verbal cues;contact guard;1 person assist (P)   -TM       Row Name 09/06/24 1055          Gait/Stairs (Locomotion)    [place] Level (Gait) verbal cues;contact guard;1 person assist (P)   -TM     Assistive Device (Gait) other (see comments) (P)   tripod monitor  -TM     Distance in Feet (Gait) 110 (P)   -TM     Deviations/Abnormal Patterns (Gait) bilateral deviations;alysa decreased;gait speed decreased;stride length decreased (P)   -TM     Bilateral Gait Deviations heel strike decreased (P)   -TM     Comment, (Gait/Stairs) Pt demo'd step through gait pattern with dec gait speed and B UE support on tripod monitor. Pt ambulated 110 ft with CGA x1 and no rest breaks required. Further distance limited by pt report of upset stomach and diarrhea. Nursing notified. (P)   -TM               User Key  (r) = Recorded By, (t) = Taken By, (c) = Cosigned By      Initials Name Provider Type    TM Stacy Gonsales, PT Student PT Student                   Obj/Interventions       Row Name 09/06/24 1058          Balance    Balance Assessment sitting static balance;sitting dynamic balance;sit to stand dynamic  balance;standing static balance;standing dynamic balance (P)   -TM     Static Sitting Balance standby assist;1-person assist;verbal cues (P)   -TM     Dynamic Sitting Balance standby assist;1-person assist;verbal cues (P)   -TM     Position, Sitting Balance supported;sitting in chair (P)   -TM     Static Standing Balance verbal cues;contact guard;1-person assist (P)   -TM     Dynamic Standing Balance verbal cues;contact guard;1-person assist (P)   -TM     Position/Device Used, Standing Balance supported;other (see comments) (P)   tripod monitor  -TM               User Key  (r) = Recorded By, (t) = Taken By, (c) = Cosigned By      Initials Name Provider Type    TM Stacy Gonsales, PT Student PT Student                   Goals/Plan    No documentation.                  Clinical Impression       Row Name 09/06/24 1059          Pain    Pretreatment Pain Rating 0/10 - no pain (P)   -TM     Posttreatment Pain Rating 0/10 - no pain (P)   -TM       Row Name 09/06/24 1059          Plan of Care Review    Plan of Care Reviewed With patient;spouse (P)   -TM     Progress improving (P)   -TM     Outcome Evaluation Pt cont to present with dec activity tolerance, endurance, and functional mobility. Pt completed STS with CGA and ambulated 110 ft with CGA x1 and B UE support on tripod monitor. Further distance limited d/t pt c/o upset stomach and diarrhea. Pt still requires skilled IP PT services to return to PLOF. Rec d/c home with assist once medically stable. (P)   -TM       Row Name 09/06/24 1059          Vital Signs    Pre Systolic BP Rehab 98 (P)   -TM     Pre Treatment Diastolic BP 60 (P)   -TM     Post Systolic BP Rehab 118 (P)   -TM     Post Treatment Diastolic BP 67 (P)   -TM     Pretreatment Heart Rate (beats/min) 86 (P)   -TM     Posttreatment Heart Rate (beats/min) 84 (P)   -TM     Pre SpO2 (%) 98 (P)   -TM     O2 Delivery Pre Treatment room air (P)   -TM     Post SpO2 (%) 97 (P)   -TM     O2 Delivery Post Treatment  room air (P)   -TM     Pre Patient Position Sitting (P)   -TM     Post Patient Position Sitting (P)   -TM       Row Name 09/06/24 1059          Positioning and Restraints    Pre-Treatment Position sitting in chair/recliner (P)   -TM     Post Treatment Position chair (P)   -TM     In Chair notified nsg;sitting;call light within reach;encouraged to call for assist;with family/caregiver;RUE elevated;LUE elevated;legs elevated (P)   -TM               User Key  (r) = Recorded By, (t) = Taken By, (c) = Cosigned By      Initials Name Provider Type    Stacy Paulino, PT Student PT Student                   Outcome Measures       Row Name 09/06/24 1103 09/06/24 0800       How much help from another person do you currently need...    Turning from your back to your side while in flat bed without using bedrails? 3 (P)   -TM 3  -KT    Moving from lying on back to sitting on the side of a flat bed without bedrails? 3 (P)   -TM 3  -KT    Moving to and from a bed to a chair (including a wheelchair)? 3 (P)   -TM 3  -KT    Standing up from a chair using your arms (e.g., wheelchair, bedside chair)? 3 (P)   -TM 3  -KT    Climbing 3-5 steps with a railing? 2 (P)   -TM 3  -KT    To walk in hospital room? 3 (P)   -TM 3  -KT    AM-PAC 6 Clicks Score (PT) 17 (P)   -TM 18  -KT    Highest Level of Mobility Goal 5 --> Static standing (P)   -TM 6 --> Walk 10 steps or more  -KT      Row Name 09/06/24 1103          Functional Assessment    Outcome Measure Options AM-PAC 6 Clicks Basic Mobility (PT) (P)   -TM               User Key  (r) = Recorded By, (t) = Taken By, (c) = Cosigned By      Initials Name Provider Type    Suzie Cruz RN Registered Nurse    Stacy Paulino, PT Student PT Student                                 Physical Therapy Education       Title: PT OT SLP Therapies (In Progress)       Topic: Physical Therapy (In Progress)       Point: Mobility training (Done)       Learning Progress Summary              Patient Acceptance, E, NR,VU by TM at 9/6/2024 1104    Acceptance, E, NR by TM at 9/5/2024 1249    Acceptance, E, VU by SHIVA at 9/4/2024 1755    Acceptance, E, NR by KG at 9/4/2024 1042   Family Acceptance, E, VU by SHIVA at 9/4/2024 1755                         Point: Home exercise program (Not Started)       Learner Progress:  Not documented in this visit.              Point: Body mechanics (Done)       Learning Progress Summary             Patient Acceptance, E, NR,VU by TM at 9/6/2024 1104    Acceptance, E, NR by TM at 9/5/2024 1249    Acceptance, E, VU by SHIVA at 9/4/2024 1755    Acceptance, E, NR by KG at 9/4/2024 1042   Family Acceptance, E, VU by SHIVA at 9/4/2024 1755                         Point: Precautions (Done)       Learning Progress Summary             Patient Acceptance, E, NR,VU by TM at 9/6/2024 1104    Acceptance, E, NR by TM at 9/5/2024 1249    Acceptance, E, NR by KG at 9/4/2024 1042                                         User Key       Initials Effective Dates Name Provider Type Discipline    SHIVA 06/16/21 -  Gaston Su RN Registered Nurse Nurse     05/22/20 -  Mamie Boucher, PT Physical Therapist PT     08/13/24 -  Stacy Gonsales, PT Student PT Student PT                  PT Recommendation and Plan     Plan of Care Reviewed With: (P) patient, spouse  Progress: (P) improving  Outcome Evaluation: (P) Pt cont to present with dec activity tolerance, endurance, and functional mobility. Pt completed STS with CGA and ambulated 110 ft with CGA x1 and B UE support on tripod monitor. Further distance limited d/t pt c/o upset stomach and diarrhea. Pt still requires skilled IP PT services to return to PLOF. Rec d/c home with assist once medically stable.     Time Calculation:         PT Charges       Row Name 09/06/24 1105             Time Calculation    Start Time 1028 (P)   -TM         Timed Charges    86853 - PT Therapeutic Exercise Minutes -- (P)   -TM      20413 - PT Therapeutic  Activity Minutes 13 (P)   -TM         Total Minutes    Timed Charges Total Minutes 13 (P)   -TM       Total Minutes 13 (P)   -TM                User Key  (r) = Recorded By, (t) = Taken By, (c) = Cosigned By      Initials Name Provider Type    TM Stacy Gonsales, PT Student PT Student                  Therapy Charges for Today       Code Description Service Date Service Provider Modifiers Qty    [id number]  PT THERAPEUTIC ACT EA 15 MIN 9/5/2024 Stacy Gonsales, PT Student GP 1    [id number]  PT THERAPEUTIC ACT EA 15 MIN 9/6/2024 Stacy Gonsales, PT Student GP 1            PT G-Codes  Outcome Measure Options: (P) AM-PAC 6 Clicks Basic Mobility (PT)  AM-PAC 6 Clicks Score (PT): (P) 17  PT Discharge Summary  Anticipated Discharge Disposition (PT): (P) home with assist    Stacy Gonsales, PT Student  9/6/2024

## 2024-09-06 NOTE — CASE MANAGEMENT/SOCIAL WORK
Continued Stay Note  [facility]     Patient Name: Hossein Gama  MRN: [id number]  Today's Date: 9/6/2024    Admit Date: 9/3/2024    Plan: Home with Family   Discharge Plan       Row Name 09/06/24 1308       Plan    Plan Home with Family    Patient/Family in Agreement with Plan yes    Plan Comments I have met with Mr. Gama and his family at the bedside.  He is sleeping.  His wife and sons state they continue to plan for him to return home when medically ready for discharge and anticipate no discharge needs.  They state they will provide him assistance at home  and will have a friend of family to assist with transportation.  PT has recommended home with assist.  CM will cont to follow plan of care and assist with discharge needs as recommendations become available.    Final Discharge Disposition Code 01 - home or self-care                   Discharge Codes    No documentation.                 Expected Discharge Date and Time       Expected Discharge Date Expected Discharge Time    Sep 6, 2024               Quin Hoskins RN

## 2024-09-07 LAB
ANION GAP SERPL CALCULATED.3IONS-SCNC: 12 MMOL/L (ref 5–15)
BH BB BLOOD EXPIRATION DATE: NORMAL
BH BB BLOOD EXPIRATION DATE: NORMAL
BH BB BLOOD TYPE BARCODE: 6200
BH BB BLOOD TYPE BARCODE: 6200
BH BB DISPENSE STATUS: NORMAL
BH BB DISPENSE STATUS: NORMAL
BH BB PRODUCT CODE: NORMAL
BH BB PRODUCT CODE: NORMAL
BH BB UNIT NUMBER: NORMAL
BH BB UNIT NUMBER: NORMAL
BUN SERPL-MCNC: 18 MG/DL (ref 8–23)
BUN/CREAT SERPL: 32.7 (ref 7–25)
CALCIUM SPEC-SCNC: 8.6 MG/DL (ref 8.6–10.5)
CHLORIDE SERPL-SCNC: 101 MMOL/L (ref 98–107)
CO2 SERPL-SCNC: 21 MMOL/L (ref 22–29)
CREAT SERPL-MCNC: 0.55 MG/DL (ref 0.76–1.27)
CROSSMATCH INTERPRETATION: NORMAL
CROSSMATCH INTERPRETATION: NORMAL
EGFRCR SERPLBLD CKD-EPI 2021: 105.3 ML/MIN/1.73
GLUCOSE BLDC GLUCOMTR-MCNC: 136 MG/DL (ref 70–130)
GLUCOSE BLDC GLUCOMTR-MCNC: 197 MG/DL (ref 70–130)
GLUCOSE BLDC GLUCOMTR-MCNC: 70 MG/DL (ref 70–130)
GLUCOSE BLDC GLUCOMTR-MCNC: 91 MG/DL (ref 70–130)
GLUCOSE BLDC GLUCOMTR-MCNC: 95 MG/DL (ref 70–130)
GLUCOSE SERPL-MCNC: 105 MG/DL (ref 65–99)
POTASSIUM SERPL-SCNC: 3.8 MMOL/L (ref 3.5–5.2)
POTASSIUM SERPL-SCNC: 3.9 MMOL/L (ref 3.5–5.2)
SODIUM SERPL-SCNC: 134 MMOL/L (ref 136–145)
UNIT  ABO: NORMAL
UNIT  ABO: NORMAL
UNIT  RH: NORMAL
UNIT  RH: NORMAL

## 2024-09-07 PROCEDURE — 82948 REAGENT STRIP/BLOOD GLUCOSE: CPT

## 2024-09-07 PROCEDURE — 63710000001 INSULIN LISPRO (HUMAN) PER 5 UNITS: Performed by: INTERNAL MEDICINE

## 2024-09-07 PROCEDURE — 84132 ASSAY OF SERUM POTASSIUM: CPT | Performed by: THORACIC SURGERY (CARDIOTHORACIC VASCULAR SURGERY)

## 2024-09-07 PROCEDURE — 99252 IP/OBS CONSLTJ NEW/EST SF 35: CPT | Performed by: NURSE PRACTITIONER

## 2024-09-07 PROCEDURE — 80048 BASIC METABOLIC PNL TOTAL CA: CPT | Performed by: PHYSICIAN ASSISTANT

## 2024-09-07 RX ORDER — SODIUM CHLORIDE 9 MG/ML
40 INJECTION, SOLUTION INTRAVENOUS AS NEEDED
Status: DISCONTINUED | OUTPATIENT
Start: 2024-09-07 | End: 2024-09-09 | Stop reason: HOSPADM

## 2024-09-07 RX ORDER — HYDROXYZINE HYDROCHLORIDE 25 MG/1
25 TABLET, FILM COATED ORAL NIGHTLY PRN
Status: DISCONTINUED | OUTPATIENT
Start: 2024-09-07 | End: 2024-09-09 | Stop reason: HOSPADM

## 2024-09-07 RX ORDER — POTASSIUM CHLORIDE 1500 MG/1
20 TABLET, EXTENDED RELEASE ORAL ONCE
Status: COMPLETED | OUTPATIENT
Start: 2024-09-07 | End: 2024-09-07

## 2024-09-07 RX ORDER — BISACODYL 5 MG/1
5 TABLET, DELAYED RELEASE ORAL DAILY PRN
Status: DISCONTINUED | OUTPATIENT
Start: 2024-09-07 | End: 2024-09-09 | Stop reason: HOSPADM

## 2024-09-07 RX ORDER — BISACODYL 10 MG
10 SUPPOSITORY, RECTAL RECTAL DAILY PRN
Status: DISCONTINUED | OUTPATIENT
Start: 2024-09-07 | End: 2024-09-09 | Stop reason: HOSPADM

## 2024-09-07 RX ORDER — POLYETHYLENE GLYCOL 3350 17 G/17G
17 POWDER, FOR SOLUTION ORAL DAILY PRN
Status: DISCONTINUED | OUTPATIENT
Start: 2024-09-07 | End: 2024-09-09 | Stop reason: HOSPADM

## 2024-09-07 RX ORDER — AMOXICILLIN 250 MG
2 CAPSULE ORAL 2 TIMES DAILY
Status: DISCONTINUED | OUTPATIENT
Start: 2024-09-07 | End: 2024-09-07

## 2024-09-07 RX ORDER — SODIUM CHLORIDE 0.9 % (FLUSH) 0.9 %
10 SYRINGE (ML) INJECTION EVERY 12 HOURS SCHEDULED
Status: DISCONTINUED | OUTPATIENT
Start: 2024-09-07 | End: 2024-09-09 | Stop reason: HOSPADM

## 2024-09-07 RX ORDER — SODIUM CHLORIDE 0.9 % (FLUSH) 0.9 %
10 SYRINGE (ML) INJECTION AS NEEDED
Status: DISCONTINUED | OUTPATIENT
Start: 2024-09-07 | End: 2024-09-09 | Stop reason: HOSPADM

## 2024-09-07 RX ORDER — NITROGLYCERIN 0.4 MG/1
0.4 TABLET SUBLINGUAL
Status: DISCONTINUED | OUTPATIENT
Start: 2024-09-07 | End: 2024-09-09 | Stop reason: HOSPADM

## 2024-09-07 RX ADMIN — POTASSIUM CHLORIDE 20 MEQ: 1500 TABLET, EXTENDED RELEASE ORAL at 21:29

## 2024-09-07 RX ADMIN — Medication 12.5 MG: at 21:30

## 2024-09-07 RX ADMIN — INSULIN LISPRO 2 UNITS: 100 INJECTION, SOLUTION INTRAVENOUS; SUBCUTANEOUS at 12:07

## 2024-09-07 RX ADMIN — CLOPIDOGREL BISULFATE 75 MG: 75 TABLET ORAL at 09:25

## 2024-09-07 RX ADMIN — Medication 5 MG: at 21:30

## 2024-09-07 RX ADMIN — Medication 12.5 MG: at 09:25

## 2024-09-07 RX ADMIN — Medication 10 ML: at 21:32

## 2024-09-07 RX ADMIN — ATORVASTATIN CALCIUM 80 MG: 40 TABLET, FILM COATED ORAL at 21:29

## 2024-09-07 RX ADMIN — EMPAGLIFLOZIN 10 MG: 10 TABLET, FILM COATED ORAL at 09:25

## 2024-09-07 RX ADMIN — POTASSIUM CHLORIDE 20 MEQ: 1500 TABLET, EXTENDED RELEASE ORAL at 12:07

## 2024-09-07 RX ADMIN — Medication 10 ML: at 09:32

## 2024-09-07 RX ADMIN — ASPIRIN 81 MG: 81 TABLET, COATED ORAL at 09:25

## 2024-09-07 NOTE — CONSULTS
"     [facility] Medicine Services  CONSULT NOTE      Patient Name: Hossein Gama  : 1950  MRN: [id number]    Primary Care Physician: Charlie Turner MD  Provider requesting consultation: Frankie Michaud MD    Subjective   Subjective     Reason for Consultation:  Medical management     HPI:  Hossein Gama is a 74 y.o. male with PMH of HTN, DM, HLD, AS and CAD presented to [facility] for CABG and aortic valve replacement by Dr Michaud on 9/3/2024. Has now been transferred to telemetry floor and Hospital medicine consulted for medical management. No postop complications at this time. Did start having multiple loose BM's yesterday after a few days of stool softeners, stopped medications and monitoring. Tolerating diet well. Urinating \"too much\", all night long. States pain is controlled. Asking for medication to help  him sleep. Feels very fatigued, but no presyncope or complications ambulating.         Personal History     Past Medical History:   Diagnosis Date    Bladder infection     h/o    CAD (coronary artery disease) 2024    Chest pain     CHF (congestive heart failure)     Deep vein thrombosis     LEFT LEG    Diabetes     GERD (gastroesophageal reflux disease)     Glaucoma     mild    Headache     Heart failure 2024    Hyperlipidemia     Hypertension     Joint pain     Light headed     Myocardial infarction     98    Pneumonia     Psoriasis     bilat knee at times-  topical ointment prn    Tinnitus        Past Surgical History:   Procedure Laterality Date    CHOLECYSTECTOMY      CORONARY ARTERY BYPASS GRAFT WITH AORTIC VALVE REPAIR/REPLACEMENT N/A 9/3/2024    Procedure: MEDIAN STERNOTOMY, CORONARY ARTERY BYPASS GRAFTING X5 UTILIZING THE LEFT INTERNAL MAMMERY ARTERY, EVH OF THE GREATER RIGHT SAPHENOUS VEIN, AORTIC VALVE REPLACEMENT, EXPLORATION OF THE LEFT RADIAL ARTERY AND YOLA PER ANESTHESIA;  Surgeon: Frankie Michaud MD;  Location: [facility];  Service: " Cardiothoracic;  Laterality: N/A;    CORONARY STENT PLACEMENT      x1    CYST REMOVAL Left     left shoulder       Family History:  family history includes Cancer in his father; No Known Problems in his mother. Otherwise pertinent FHx was reviewed and unremarkable.     Social History:  reports that he quit smoking about 40 years ago. His smoking use included pipe. He has never been exposed to tobacco smoke. He has never used smokeless tobacco. He reports that he does not drink alcohol and does not use drugs.    Medications:  aspirin, atorvastatin, carvedilol, furosemide, losartan, metFORMIN ER, nitroglycerin, potassium chloride, and spironolactone    Scheduled Meds:aspirin, 81 mg, Oral, Daily  atorvastatin, 80 mg, Oral, Nightly  clopidogrel, 75 mg, Oral, Daily  empagliflozin, 10 mg, Oral, Daily  famotidine, 20 mg, Oral, BID AC  insulin lispro, 2-9 Units, Subcutaneous, 4x Daily AC & at Bedtime  metoprolol tartrate, 12.5 mg, Oral, Q12H  pharmacy consult - [place], , Does not apply, Daily  protamine, 50 mg, Intravenous, Once  sodium chloride, 10 mL, Intravenous, Q12H      Continuous Infusions:   PRN Meds:.  acetaminophen    [DISCONTINUED] senna-docusate sodium **AND** polyethylene glycol **AND** bisacodyl **AND** bisacodyl    Calcium Replacement - Follow Nurse / BPA Driven Protocol    dextrose    dextrose    glucagon (human recombinant)    HYDROcodone-acetaminophen    Magnesium Cardiology Dose Replacement - Follow Nurse / BPA Driven Protocol    nitroglycerin    oxyCODONE    Phosphorus Replacement - Follow Nurse / BPA Driven Protocol    Potassium Replacement - Follow Nurse / BPA Driven Protocol    sodium chloride    sodium chloride    No Known Allergies    Objective   Objective     Vital Signs:   Temp:  [97.2 °F (36.2 °C)-98.6 °F (37 °C)] 97.2 °F (36.2 °C)  Heart Rate:  [83-99] 83  Resp:  [18] 18  BP: (110-133)/(65-78) 133/78    Physical Exam  Constitutional: No acute distress, awake, alert up on side of bed  HENT: NCAT,  mucous membranes moist  Respiratory: Clear to auscultation bilaterally, respiratory effort normal   Cardiovascular: RRR, no murmurs, rubs, or gallops  Gastrointestinal: Positive bowel sounds, soft, nontender, nondistended  Musculoskeletal: Trace bilateral ankle edema  Psychiatric: Appropriate affect, cooperative  Neurologic: Oriented x 3, strength symmetric in all extremities, Cranial Nerves grossly intact to confrontation, speech clear  Skin: No rashes, vertical midline chest incision YARIEL/ well approximated      Result Review:  I have personally reviewed the results from the time of admission and agree with these findings:  [x]  Laboratory  []  Radiology  []  EKG/Telemetry   []  Pathology  [x]  Old records  []  Other:  Most notable findings include:    LAB RESULTS:      Lab 09/06/24  0444 09/05/24  0421 09/04/24  0407 09/03/24  1817 09/03/24  1432 09/03/24  0711 09/03/24  0638   WBC 11.70* 14.16* 12.50* 15.95* 16.36*  --   --    HEMOGLOBIN 11.1* 11.1* 11.9* 12.3* 13.4  --   --    HEMOGLOBIN, POC  --   --   --   --   --    < >  --    HEMATOCRIT 33.1* 33.0* 36.0* 37.4* 40.2  --   --    HEMATOCRIT POC  --   --   --   --   --    < >  --    PLATELETS 97* 98* 118* 117* 109*  --   --    NEUTROS ABS  --   --  9.45*  --   --   --   --    IMMATURE GRANS (ABS)  --   --  0.12*  --   --   --   --    LYMPHS ABS  --   --  1.38  --   --   --   --    MONOS ABS  --   --  1.38*  --   --   --   --    EOS ABS  --   --  0.14  --   --   --   --    MCV 87.6 87.8 88.7 89.0 87.6  --   --    PROTIME  --   --  22.2*  --  21.7*  --  14.9*   APTT  --   --   --   --  36.1  --  27.8    < > = values in this interval not displayed.         Lab 09/07/24  0902 09/06/24  0444 09/05/24  0454 09/04/24  0407 09/03/24  1817 09/03/24  1432   SODIUM 134* 132* 131* 141 144 142   POTASSIUM 3.8 3.9 4.4 4.5 4.5 4.0   CHLORIDE 101 99 98 110* 112* 112*   CO2 21.0* 21.0* 20.0* 21.0* 19.0* 20.0*   ANION GAP 12.0 12.0 13.0 10.0 13.0 10.0   BUN 18 17 18 14 11 11    CREATININE 0.55* 0.65* 0.67* 0.83 0.81 0.88   EGFR 105.3 100.1 99.2 93.0 93.7 91.4   GLUCOSE 105* 91 182* 191* 168* 94   CALCIUM 8.6 8.9 8.4* 8.3* 8.9 8.9   IONIZED CALCIUM  --   --   --   --   --  1.31   MAGNESIUM  --   --   --  2.0 2.2 2.4   PHOSPHORUS  --   --   --   --  3.0 2.7         Lab 09/04/24  0407 09/03/24  1817 09/03/24  1432   TOTAL PROTEIN 5.4*  --   --    ALBUMIN 3.9 4.3 3.4*   GLOBULIN 1.5  --   --    ALT (SGPT) 11  --   --    AST (SGOT) 59*  --   --    BILIRUBIN 1.4*  --   --    ALK PHOS 66  --   --          Lab 09/04/24  0407 09/03/24  1432 09/03/24  0638   PROTIME 22.2* 21.7* 14.9*   INR 1.94* 1.88* 1.16*         Lab 09/04/24  0407   CHOLESTEROL 58   LDL CHOL 11   HDL CHOL 27*   TRIGLYCERIDES 106         Lab 09/03/24  0649   ABO TYPING A   RH TYPING Positive   ANTIBODY SCREEN Negative         Lab 09/03/24 1915 09/03/24  1831 09/03/24  1458   PH, ARTERIAL 7.329* 7.300* 7.384   PCO2, ARTERIAL 40.1 39.0 34.4*   PO2 ART 69.8* 64.3* 160.0*   FIO2 50 40 100   HCO3 ART 21.1 19.2* 20.5   BASE EXCESS ART -4.6* -6.8* -3.8*   CARBOXYHEMOGLOBIN 1.3 1.3 1.1     Brief Urine Lab Results       None          Microbiology Results (last 10 days)       ** No results found for the last 240 hours. **            Adult Transthoracic Echo Limited W/ Cont if Necessary Per Protocol    Result Date: 9/6/2024    Left ventricular systolic function is moderately decreased. Left ventricular ejection fraction appears to be 31 - 35%.   Left ventricular wall thickness is consistent with mild septal asymmetric hypertrophy.     XR Chest 1 View    Result Date: 9/6/2024  XR CHEST 1 VW Date of Exam: 9/6/2024 3:07 AM EDT Indication: s/p chest tube removal Comparison: Chest radiograph 9/5/2024. Findings: Removal of right IJ approach central venous catheter and chest tubes. Sternotomy. Cardiomediastinal silhouette is unchanged. Low lung volumes with bibasilar subsegmental atelectasis and trace bilateral pleural effusions, slightly  decreased. Questionable minimal/trace right pneumothorax. Osseous structures are unchanged.     Impression: Impression: Questionable minimal/trace right pneumothorax. Slightly improved trace bilateral pleural effusions and basilar atelectasis. Electronically Signed: Abhinav Dubose MD  9/6/2024 7:45 AM EDT  Workstation ID: [id number]     Results for orders placed during the hospital encounter of 09/03/24    Adult Transthoracic Echo Limited W/ Cont if Necessary Per Protocol    Interpretation Summary    Left ventricular systolic function is moderately decreased. Left ventricular ejection fraction appears to be 31 - 35%.    Left ventricular wall thickness is consistent with mild septal asymmetric hypertrophy.      Assessment & Plan   Assessment & Plan     Active Hospital Problems    Diagnosis  POA    **CAD s/p CABG x 5, AVR (23 mm Inspiris) 9/3/24 [I25.10]  Unknown    Benign essential HTN [I10]  Unknown    Congestive heart failure with left ventricular dysfunction [I50.9]  Unknown    Type 2 diabetes mellitus [E11.9]  Unknown    Aortic stenosis [I35.0]  Unknown    Dyslipidemia [E78.5]  Unknown      Resolved Hospital Problems   No resolved problems to display.       NSTEMI  CAD  HTN  HFrEF  --s/p CABG x 5 and aortic valve replacement on 9/3  --CTS and Cardiology follows   --ASA, Plavix, statin, beta blocker, Jardiance  --post op ECHO with EF 31-35%    DM   --cont MDSSI  --A1c 7.5%  --cont Metformin at DC    Diarrhea   --likely due to recent BID stool softeners  --cont to hold stool softeners and monitor  --no indication to think this is infectious, patient denies any BM's today thus far, no abd pain, no fever     Insomnia  --added melatonin and Atarax PRN for sleep       Thank you for allowing [facility] Medicine Service to provide consultative care for your patient, we will continue to follow while clinically appropriate.    Daisy Crump, APRN  09/07/24

## 2024-09-07 NOTE — PLAN OF CARE
Goal Outcome Evaluation: Aox4 room air pt is resting with family at bedside

## 2024-09-07 NOTE — PROGRESS NOTES
Cardiothoracic Surgery Progress Note      POD # 4 s/p CABG x 5, AVR      LOS: 4 days      Subjective:  Resting comfortably in chair     Objective:  Vital Signs  Temp:  [97.7 °F (36.5 °C)-98.6 °F (37 °C)] 97.9 °F (36.6 °C)  Heart Rate:  [78-99] 88  Resp:  [16-18] 18  BP: ()/(60-76) 113/76    Physical Exam:   General Appearance: alert, appears stated age and cooperative   Lungs: clear to auscultation, respirations regular, respirations even, and respirations unlabored   Heart: regular rhythm & normal rate, normal S1, S2, no murmur, no gallop, no rub, and no click   Skin: Incision c/d/i    Output by Drain (mL) 09/06/24 0701 - 09/06/24 1900 09/06/24 1901 - 09/07/24 0700 09/07/24 0701 - 09/07/24 0935 Range Total   Patient has no LDAs of requested type attached.        Results:    Results from last 7 days   Lab Units 09/06/24  0444   WBC 10*3/mm3 11.70*   HEMOGLOBIN g/dL 11.1*   HEMATOCRIT % 33.1*   PLATELETS 10*3/mm3 97*     Results from last 7 days   Lab Units 09/06/24  0444   SODIUM mmol/L 132*   POTASSIUM mmol/L 3.9   CHLORIDE mmol/L 99   CO2 mmol/L 21.0*   BUN mg/dL 17   CREATININE mg/dL 0.65*   GLUCOSE mg/dL 91   CALCIUM mg/dL 8.9       Assessment:  POD # 4 s/p CABG x 5, AVR     Plan:  Pulmonary toilet  Ambulate  ASA, statin, BB  Home 24 to 48 hours  Need GI symptoms to improve          Pieter Macdonald PA-C  09/07/24  09:35 EDT

## 2024-09-08 LAB
ANION GAP SERPL CALCULATED.3IONS-SCNC: 11 MMOL/L (ref 5–15)
BASOPHILS # BLD AUTO: 0.01 10*3/MM3 (ref 0–0.2)
BASOPHILS NFR BLD AUTO: 0.1 % (ref 0–1.5)
BUN SERPL-MCNC: 17 MG/DL (ref 8–23)
BUN/CREAT SERPL: 34 (ref 7–25)
CALCIUM SPEC-SCNC: 8.3 MG/DL (ref 8.6–10.5)
CHLORIDE SERPL-SCNC: 103 MMOL/L (ref 98–107)
CO2 SERPL-SCNC: 22 MMOL/L (ref 22–29)
CREAT SERPL-MCNC: 0.5 MG/DL (ref 0.76–1.27)
DEPRECATED RDW RBC AUTO: 47.2 FL (ref 37–54)
EGFRCR SERPLBLD CKD-EPI 2021: 108.4 ML/MIN/1.73
EOSINOPHIL # BLD AUTO: 0.17 10*3/MM3 (ref 0–0.4)
EOSINOPHIL NFR BLD AUTO: 2.1 % (ref 0.3–6.2)
ERYTHROCYTE [DISTWIDTH] IN BLOOD BY AUTOMATED COUNT: 14.8 % (ref 12.3–15.4)
GLUCOSE BLDC GLUCOMTR-MCNC: 100 MG/DL (ref 70–130)
GLUCOSE BLDC GLUCOMTR-MCNC: 110 MG/DL (ref 70–130)
GLUCOSE BLDC GLUCOMTR-MCNC: 71 MG/DL (ref 70–130)
GLUCOSE BLDC GLUCOMTR-MCNC: 93 MG/DL (ref 70–130)
GLUCOSE SERPL-MCNC: 69 MG/DL (ref 65–99)
HCT VFR BLD AUTO: 31.4 % (ref 37.5–51)
HCT VFR BLD AUTO: 31.9 % (ref 37.5–51)
HGB BLD-MCNC: 10.5 G/DL (ref 13–17.7)
HGB BLD-MCNC: 10.7 G/DL (ref 13–17.7)
IMM GRANULOCYTES # BLD AUTO: 0.05 10*3/MM3 (ref 0–0.05)
IMM GRANULOCYTES NFR BLD AUTO: 0.6 % (ref 0–0.5)
LYMPHOCYTES # BLD AUTO: 1.43 10*3/MM3 (ref 0.7–3.1)
LYMPHOCYTES NFR BLD AUTO: 17.5 % (ref 19.6–45.3)
MCH RBC QN AUTO: 29.7 PG (ref 26.6–33)
MCHC RBC AUTO-ENTMCNC: 33.4 G/DL (ref 31.5–35.7)
MCV RBC AUTO: 88.7 FL (ref 79–97)
MONOCYTES # BLD AUTO: 1.16 10*3/MM3 (ref 0.1–0.9)
MONOCYTES NFR BLD AUTO: 14.2 % (ref 5–12)
NEUTROPHILS NFR BLD AUTO: 5.33 10*3/MM3 (ref 1.7–7)
NEUTROPHILS NFR BLD AUTO: 65.5 % (ref 42.7–76)
NRBC BLD AUTO-RTO: 0 /100 WBC (ref 0–0.2)
PLATELET # BLD AUTO: 135 10*3/MM3 (ref 140–450)
PMV BLD AUTO: 10.3 FL (ref 6–12)
POTASSIUM SERPL-SCNC: 3.6 MMOL/L (ref 3.5–5.2)
POTASSIUM SERPL-SCNC: 4 MMOL/L (ref 3.5–5.2)
QT INTERVAL: 396 MS
QTC INTERVAL: 460 MS
RBC # BLD AUTO: 3.54 10*6/MM3 (ref 4.14–5.8)
SODIUM SERPL-SCNC: 136 MMOL/L (ref 136–145)
WBC NRBC COR # BLD AUTO: 8.15 10*3/MM3 (ref 3.4–10.8)

## 2024-09-08 PROCEDURE — 85018 HEMOGLOBIN: CPT | Performed by: PHYSICIAN ASSISTANT

## 2024-09-08 PROCEDURE — 84132 ASSAY OF SERUM POTASSIUM: CPT | Performed by: NURSE PRACTITIONER

## 2024-09-08 PROCEDURE — 80048 BASIC METABOLIC PNL TOTAL CA: CPT | Performed by: PHYSICIAN ASSISTANT

## 2024-09-08 PROCEDURE — 85025 COMPLETE CBC W/AUTO DIFF WBC: CPT | Performed by: NURSE PRACTITIONER

## 2024-09-08 PROCEDURE — 82948 REAGENT STRIP/BLOOD GLUCOSE: CPT

## 2024-09-08 PROCEDURE — 99232 SBSQ HOSP IP/OBS MODERATE 35: CPT | Performed by: NURSE PRACTITIONER

## 2024-09-08 PROCEDURE — 85014 HEMATOCRIT: CPT | Performed by: PHYSICIAN ASSISTANT

## 2024-09-08 RX ORDER — L.ACID,PARA/B.BIFIDUM/S.THERM 8B CELL
1 CAPSULE ORAL DAILY
Status: DISCONTINUED | OUTPATIENT
Start: 2024-09-08 | End: 2024-09-09 | Stop reason: HOSPADM

## 2024-09-08 RX ORDER — CLOPIDOGREL BISULFATE 75 MG/1
75 TABLET ORAL DAILY
Qty: 30 TABLET | Refills: 5 | Status: SHIPPED | OUTPATIENT
Start: 2024-09-09 | End: 2024-09-09

## 2024-09-08 RX ORDER — LOPERAMIDE HCL 2 MG
2 CAPSULE ORAL 2 TIMES DAILY PRN
Status: DISCONTINUED | OUTPATIENT
Start: 2024-09-08 | End: 2024-09-09 | Stop reason: HOSPADM

## 2024-09-08 RX ORDER — POTASSIUM CHLORIDE 1500 MG/1
40 TABLET, EXTENDED RELEASE ORAL EVERY 4 HOURS
Status: DISCONTINUED | OUTPATIENT
Start: 2024-09-08 | End: 2024-09-08

## 2024-09-08 RX ORDER — METOPROLOL SUCCINATE 25 MG/1
25 TABLET, EXTENDED RELEASE ORAL DAILY
Qty: 30 TABLET | Refills: 1 | Status: SHIPPED | OUTPATIENT
Start: 2024-09-08 | End: 2024-09-09

## 2024-09-08 RX ORDER — POTASSIUM CHLORIDE 1.5 G/1.58G
40 POWDER, FOR SOLUTION ORAL EVERY 4 HOURS
Status: ACTIVE | OUTPATIENT
Start: 2024-09-08 | End: 2024-09-09

## 2024-09-08 RX ADMIN — POTASSIUM CHLORIDE 40 MEQ: 1.5 POWDER, FOR SOLUTION ORAL at 18:20

## 2024-09-08 RX ADMIN — Medication 10 ML: at 20:54

## 2024-09-08 RX ADMIN — POTASSIUM CHLORIDE 40 MEQ: 1500 TABLET, EXTENDED RELEASE ORAL at 14:49

## 2024-09-08 RX ADMIN — Medication 12.5 MG: at 08:23

## 2024-09-08 RX ADMIN — LOPERAMIDE HYDROCHLORIDE 2 MG: 2 CAPSULE ORAL at 14:49

## 2024-09-08 RX ADMIN — Medication 12.5 MG: at 20:50

## 2024-09-08 RX ADMIN — EMPAGLIFLOZIN 10 MG: 10 TABLET, FILM COATED ORAL at 08:24

## 2024-09-08 RX ADMIN — CLOPIDOGREL BISULFATE 75 MG: 75 TABLET ORAL at 08:23

## 2024-09-08 RX ADMIN — HYDROXYZINE HYDROCHLORIDE 25 MG: 25 TABLET, FILM COATED ORAL at 20:53

## 2024-09-08 RX ADMIN — Medication 5 MG: at 20:53

## 2024-09-08 RX ADMIN — ASPIRIN 81 MG: 81 TABLET, COATED ORAL at 08:23

## 2024-09-08 RX ADMIN — Medication 1 CAPSULE: at 14:49

## 2024-09-08 RX ADMIN — HYDROXYZINE HYDROCHLORIDE 25 MG: 25 TABLET, FILM COATED ORAL at 00:18

## 2024-09-08 RX ADMIN — FAMOTIDINE 20 MG: 20 TABLET, FILM COATED ORAL at 18:13

## 2024-09-08 RX ADMIN — ATORVASTATIN CALCIUM 80 MG: 40 TABLET, FILM COATED ORAL at 20:53

## 2024-09-08 RX ADMIN — FAMOTIDINE 20 MG: 20 TABLET, FILM COATED ORAL at 08:23

## 2024-09-08 RX ADMIN — ACETAMINOPHEN 650 MG: 325 TABLET ORAL at 14:59

## 2024-09-08 RX ADMIN — Medication 10 ML: at 08:25

## 2024-09-08 RX ADMIN — ACETAMINOPHEN 650 MG: 325 TABLET ORAL at 00:18

## 2024-09-08 NOTE — PROGRESS NOTES
[facility] Medicine Services  PROGRESS NOTE    Patient Name: Hossein Gama  : 1950  MRN: [id number]    Date of Admission: 9/3/2024  Primary Care Physician: Charlie Turner MD    Subjective   Subjective     CC:  Medical management     HPI:  Doing well. NAD. Pain controlled. Tolerating diet. Family in room. Family asking how to manage loose stools at home, will increase fiber in diet and try Imodium OTC as long as no abdominal pain, firmness, n/v, or fevers.       Objective   Objective     Vital Signs:   Temp:  [97.4 °F (36.3 °C)-98 °F (36.7 °C)] 98 °F (36.7 °C)  Heart Rate:  [77-95] 77  Resp:  [18] 18  BP: (110-137)/(66-95) 126/66     Physical Exam:  Constitutional: No acute distress, awake, alert   HENT: NCAT, mucous membranes moist  Respiratory: Clear to auscultation bilaterally, respiratory effort normal   Cardiovascular: RRR, no murmurs, rubs, or gallops  Gastrointestinal: Positive bowel sounds, soft, nontender, nondistended  Musculoskeletal: Trace bilateral ankle edema  Psychiatric: Appropriate affect, cooperative  Neurologic: Oriented x 3, strength symmetric in all extremities, Cranial Nerves grossly intact to confrontation, speech clear  Skin: No rashes, vertical midline chest incision YARIEL/ well approximated    Results Reviewed:  LAB RESULTS:      Lab 24  0651 24  0444 24  0421 24  0407 24  1817 24  1432 24  0711 24  0638   WBC  --  11.70* 14.16* 12.50* 15.95* 16.36*  --   --    HEMOGLOBIN 10.7* 11.1* 11.1* 11.9* 12.3* 13.4   < >  --    HEMOGLOBIN, POC  --   --   --   --   --   --    < >  --    HEMATOCRIT 31.9* 33.1* 33.0* 36.0* 37.4* 40.2   < >  --    HEMATOCRIT POC  --   --   --   --   --   --    < >  --    PLATELETS  --  97* 98* 118* 117* 109*  --   --    NEUTROS ABS  --   --   --  9.45*  --   --   --   --    IMMATURE GRANS (ABS)  --   --   --  0.12*  --   --   --   --    LYMPHS ABS  --   --   --  1.38  --   --   --    --    MONOS ABS  --   --   --  1.38*  --   --   --   --    EOS ABS  --   --   --  0.14  --   --   --   --    MCV  --  87.6 87.8 88.7 89.0 87.6  --   --    PROTIME  --   --   --  22.2*  --  21.7*  --  14.9*   APTT  --   --   --   --   --  36.1  --  27.8    < > = values in this interval not displayed.         Lab 09/08/24  0651 09/07/24  2358 09/07/24  1558 09/07/24  0902 09/06/24  0444 09/05/24  0454 09/04/24 0407 09/03/24 1817 09/03/24  1432   SODIUM 136  --   --  134* 132* 131* 141 144 142   POTASSIUM 3.6 4.0 3.9 3.8 3.9 4.4 4.5 4.5 4.0   CHLORIDE 103  --   --  101 99 98 110* 112* 112*   CO2 22.0  --   --  21.0* 21.0* 20.0* 21.0* 19.0* 20.0*   ANION GAP 11.0  --   --  12.0 12.0 13.0 10.0 13.0 10.0   BUN 17  --   --  18 17 18 14 11 11   CREATININE 0.50*  --   --  0.55* 0.65* 0.67* 0.83 0.81 0.88   EGFR 108.4  --   --  105.3 100.1 99.2 93.0 93.7 91.4   GLUCOSE 69  --   --  105* 91 182* 191* 168* 94   CALCIUM 8.3*  --   --  8.6 8.9 8.4* 8.3* 8.9 8.9   IONIZED CALCIUM  --   --   --   --   --   --   --   --  1.31   MAGNESIUM  --   --   --   --   --   --  2.0 2.2 2.4   PHOSPHORUS  --   --   --   --   --   --   --  3.0 2.7         Lab 09/04/24 0407 09/03/24 1817 09/03/24  1432   TOTAL PROTEIN 5.4*  --   --    ALBUMIN 3.9 4.3 3.4*   GLOBULIN 1.5  --   --    ALT (SGPT) 11  --   --    AST (SGOT) 59*  --   --    BILIRUBIN 1.4*  --   --    ALK PHOS 66  --   --          Lab 09/04/24  0407 09/03/24  1432 09/03/24  0638   PROTIME 22.2* 21.7* 14.9*   INR 1.94* 1.88* 1.16*         Lab 09/04/24  0407   CHOLESTEROL 58   LDL CHOL 11   HDL CHOL 27*   TRIGLYCERIDES 106         Lab 09/03/24  0649   ABO TYPING A   RH TYPING Positive   ANTIBODY SCREEN Negative         Lab 09/03/24  1915 09/03/24  1831 09/03/24  1458   PH, ARTERIAL 7.329* 7.300* 7.384   PCO2, ARTERIAL 40.1 39.0 34.4*   PO2 ART 69.8* 64.3* 160.0*   FIO2 50 40 100   HCO3 ART 21.1 19.2* 20.5   BASE EXCESS ART -4.6* -6.8* -3.8*   CARBOXYHEMOGLOBIN 1.3 1.3 1.1     Brief  Urine Lab Results       None            Microbiology Results Abnormal       None            No radiology results from the last 24 hrs    Results for orders placed during the hospital encounter of 09/03/24    Adult Transthoracic Echo Limited W/ Cont if Necessary Per Protocol    Interpretation Summary    Left ventricular systolic function is moderately decreased. Left ventricular ejection fraction appears to be 31 - 35%.    Left ventricular wall thickness is consistent with mild septal asymmetric hypertrophy.      Current medications:  Scheduled Meds:aspirin, 81 mg, Oral, Daily  atorvastatin, 80 mg, Oral, Nightly  clopidogrel, 75 mg, Oral, Daily  empagliflozin, 10 mg, Oral, Daily  famotidine, 20 mg, Oral, BID AC  insulin lispro, 2-9 Units, Subcutaneous, 4x Daily AC & at Bedtime  melatonin, 5 mg, Oral, Nightly  metoprolol tartrate, 12.5 mg, Oral, Q12H  pharmacy consult - MT, , Does not apply, Daily  protamine, 50 mg, Intravenous, Once  sodium chloride, 10 mL, Intravenous, Q12H      Continuous Infusions:   PRN Meds:.  acetaminophen    [DISCONTINUED] senna-docusate sodium **AND** polyethylene glycol **AND** bisacodyl **AND** bisacodyl    Calcium Replacement - Follow Nurse / BPA Driven Protocol    dextrose    dextrose    glucagon (human recombinant)    HYDROcodone-acetaminophen    hydrOXYzine    Magnesium Cardiology Dose Replacement - Follow Nurse / BPA Driven Protocol    nitroglycerin    oxyCODONE    Phosphorus Replacement - Follow Nurse / BPA Driven Protocol    Potassium Replacement - Follow Nurse / BPA Driven Protocol    sodium chloride    sodium chloride    Assessment & Plan   Assessment & Plan     Active Hospital Problems    Diagnosis  POA    **CAD s/p CABG x 5, AVR (23 mm Inspiris) 9/3/24 [I25.10]  Unknown    Benign essential HTN [I10]  Unknown    Congestive heart failure with left ventricular dysfunction [I50.9]  Unknown    Type 2 diabetes mellitus [E11.9]  Unknown    Aortic stenosis [I35.0]  Unknown     Dyslipidemia [E78.5]  Unknown      Resolved Hospital Problems   No resolved problems to display.        Brief Hospital Course to date:  Hossein Gama is a 74 y.o. male with PMH of HTN, DM, HLD, AS and CAD presented to [facility] for CABG and aortic valve replacement by Dr Michaud on 9/3/2024.     NSTEMI  CAD  HTN  HFrEF  --s/p CABG x 5 and aortic valve replacement on 9/3  --CTS and Cardiology follows   --ASA, Plavix, statin, beta blocker, Jardiance  --post op ECHO with EF 31-35%     DM   --cont MDSSI  --A1c 7.5%  --cont Metformin at DC     Diarrhea   --likely due to recent BID stool softeners  --cont to hold stool softeners and monitor  --no indication to think this is infectious, no abd pain, no fever, normal WBC (patient has been here too long to add GI PCR)  --added Imodium, probiotic and increased fiber in diet     Insomnia  --added melatonin and Atarax PRN for sleep       Expected Discharge Location and Transportation: home soon per primary team   Expected Discharge   Expected Discharge Date: 9/8/2024; Expected Discharge Time:      VTE Prophylaxis:  Mechanical VTE prophylaxis orders are present.         AM-PAC 6 Clicks Score (PT): 17 (09/08/24 0823)    CODE STATUS:   Code Status and Medical Interventions: CPR (Attempt to Resuscitate); Full Support   Ordered at: 09/03/24 1430     Code Status (Patient has no pulse and is not breathing):    CPR (Attempt to Resuscitate)     Medical Interventions (Patient has pulse or is breathing):    Full Support       Daisy Crump, KAILEY  09/08/24

## 2024-09-08 NOTE — PLAN OF CARE
Goal Outcome Evaluation:  Plan of Care Reviewed With: (P) patient              Pt A&Ox4, RA, ambulated in hallway x2 about 172 feet, PRN Imodium given for diarrhea (SEE MAR), VSS, no complaints or concerns at this time. Family @ bedside call light within reach.

## 2024-09-09 ENCOUNTER — READMISSION MANAGEMENT (OUTPATIENT)
Dept: CALL CENTER | Facility: HOSPITAL | Age: 74
End: 2024-09-09

## 2024-09-09 ENCOUNTER — APPOINTMENT (OUTPATIENT)
Dept: GENERAL RADIOLOGY | Facility: HOSPITAL | Age: 74
DRG: 220 | End: 2024-09-09

## 2024-09-09 VITALS
BODY MASS INDEX: 29.25 KG/M2 | HEART RATE: 90 BPM | WEIGHT: 193 LBS | RESPIRATION RATE: 18 BRPM | HEIGHT: 68 IN | SYSTOLIC BLOOD PRESSURE: 110 MMHG | DIASTOLIC BLOOD PRESSURE: 67 MMHG | TEMPERATURE: 98.4 F | OXYGEN SATURATION: 100 %

## 2024-09-09 LAB
ANION GAP SERPL CALCULATED.3IONS-SCNC: 14 MMOL/L (ref 5–15)
BUN SERPL-MCNC: 13 MG/DL (ref 8–23)
BUN/CREAT SERPL: 27.7 (ref 7–25)
CALCIUM SPEC-SCNC: 8.3 MG/DL (ref 8.6–10.5)
CHLORIDE SERPL-SCNC: 103 MMOL/L (ref 98–107)
CO2 SERPL-SCNC: 18 MMOL/L (ref 22–29)
CREAT SERPL-MCNC: 0.47 MG/DL (ref 0.76–1.27)
EGFRCR SERPLBLD CKD-EPI 2021: 110.4 ML/MIN/1.73
GLUCOSE BLDC GLUCOMTR-MCNC: 233 MG/DL (ref 70–130)
GLUCOSE BLDC GLUCOMTR-MCNC: 68 MG/DL (ref 70–130)
GLUCOSE SERPL-MCNC: 73 MG/DL (ref 65–99)
HCT VFR BLD AUTO: 31 % (ref 37.5–51)
HGB BLD-MCNC: 10.6 G/DL (ref 13–17.7)
POTASSIUM SERPL-SCNC: 4.2 MMOL/L (ref 3.5–5.2)
POTASSIUM SERPL-SCNC: 4.2 MMOL/L (ref 3.5–5.2)
SODIUM SERPL-SCNC: 135 MMOL/L (ref 136–145)

## 2024-09-09 PROCEDURE — 63710000001 INSULIN LISPRO (HUMAN) PER 5 UNITS: Performed by: INTERNAL MEDICINE

## 2024-09-09 PROCEDURE — 71045 X-RAY EXAM CHEST 1 VIEW: CPT

## 2024-09-09 PROCEDURE — 80048 BASIC METABOLIC PNL TOTAL CA: CPT | Performed by: PHYSICIAN ASSISTANT

## 2024-09-09 PROCEDURE — 99232 SBSQ HOSP IP/OBS MODERATE 35: CPT | Performed by: NURSE PRACTITIONER

## 2024-09-09 PROCEDURE — 99024 POSTOP FOLLOW-UP VISIT: CPT

## 2024-09-09 PROCEDURE — 99232 SBSQ HOSP IP/OBS MODERATE 35: CPT | Performed by: INTERNAL MEDICINE

## 2024-09-09 PROCEDURE — 85014 HEMATOCRIT: CPT | Performed by: PHYSICIAN ASSISTANT

## 2024-09-09 PROCEDURE — 85018 HEMOGLOBIN: CPT | Performed by: PHYSICIAN ASSISTANT

## 2024-09-09 PROCEDURE — 82948 REAGENT STRIP/BLOOD GLUCOSE: CPT

## 2024-09-09 RX ORDER — METOPROLOL SUCCINATE 25 MG/1
25 TABLET, EXTENDED RELEASE ORAL DAILY
Qty: 30 TABLET | Refills: 1 | Status: SHIPPED | OUTPATIENT
Start: 2024-09-09

## 2024-09-09 RX ORDER — LISINOPRIL 5 MG/1
2.5 TABLET ORAL
Status: DISCONTINUED | OUTPATIENT
Start: 2024-09-09 | End: 2024-09-09 | Stop reason: HOSPADM

## 2024-09-09 RX ORDER — OXYMETAZOLINE HYDROCHLORIDE 0.05 G/100ML
2 SPRAY NASAL 2 TIMES DAILY
Status: DISCONTINUED | OUTPATIENT
Start: 2024-09-09 | End: 2024-09-09 | Stop reason: HOSPADM

## 2024-09-09 RX ORDER — LISINOPRIL 2.5 MG/1
2.5 TABLET ORAL
Qty: 90 TABLET | Refills: 11 | Status: SHIPPED | OUTPATIENT
Start: 2024-09-09

## 2024-09-09 RX ORDER — CLOPIDOGREL BISULFATE 75 MG/1
75 TABLET ORAL DAILY
Qty: 30 TABLET | Refills: 5 | Status: SHIPPED | OUTPATIENT
Start: 2024-09-09

## 2024-09-09 RX ADMIN — HYDROCODONE BITARTRATE AND ACETAMINOPHEN 1 TABLET: 7.5; 325 TABLET ORAL at 17:11

## 2024-09-09 RX ADMIN — FAMOTIDINE 20 MG: 20 TABLET, FILM COATED ORAL at 08:17

## 2024-09-09 RX ADMIN — ASPIRIN 81 MG: 81 TABLET, COATED ORAL at 08:17

## 2024-09-09 RX ADMIN — Medication 2 SPRAY: at 14:07

## 2024-09-09 RX ADMIN — ACETAMINOPHEN 650 MG: 325 TABLET ORAL at 06:10

## 2024-09-09 RX ADMIN — LISINOPRIL 2.5 MG: 5 TABLET ORAL at 14:08

## 2024-09-09 RX ADMIN — Medication 10 ML: at 08:18

## 2024-09-09 RX ADMIN — Medication 1 CAPSULE: at 08:17

## 2024-09-09 RX ADMIN — Medication 12.5 MG: at 08:17

## 2024-09-09 RX ADMIN — EMPAGLIFLOZIN 10 MG: 10 TABLET, FILM COATED ORAL at 08:17

## 2024-09-09 RX ADMIN — CLOPIDOGREL BISULFATE 75 MG: 75 TABLET ORAL at 08:17

## 2024-09-09 RX ADMIN — INSULIN LISPRO 4 UNITS: 100 INJECTION, SOLUTION INTRAVENOUS; SUBCUTANEOUS at 12:14

## 2024-09-09 NOTE — PROGRESS NOTES
Cardiothoracic Surgery Progress Note      POD # 5 s/p CABG x 5, AVR      LOS: 6 days      Subjective:  Denies chest pain or shortness of breath.  On room air.  Currently experiencing a nosebleed.    Objective:  Vital Signs  Temp:  [97.5 °F (36.4 °C)-98.4 °F (36.9 °C)] 98.3 °F (36.8 °C)  Heart Rate:  [77-88] 87  Resp:  [16-18] 16  BP: (112-133)/(68-75) 112/69    Physical Exam:   General Appearance: alert, appears stated age and cooperative   Lungs: clear to auscultation, respirations regular, respirations even, and respirations unlabored   Heart: regular rhythm & normal rate, normal S1, S2, no murmur, no gallop, no rub, and no click   Skin: Incision c/d/I   Sternum: Stable     Results:    Results from last 7 days   Lab Units 09/09/24  0518 09/08/24  0651   WBC 10*3/mm3  --  8.15   HEMOGLOBIN g/dL 10.6* 10.5*  10.7*   HEMATOCRIT % 31.0* 31.4*  31.9*   PLATELETS 10*3/mm3  --  135*     Results from last 7 days   Lab Units 09/09/24  0518   SODIUM mmol/L 135*   POTASSIUM mmol/L 4.2   CHLORIDE mmol/L 103   CO2 mmol/L 18.0*   BUN mg/dL 13   CREATININE mg/dL 0.47*   GLUCOSE mg/dL 73   CALCIUM mg/dL 8.3*       Assessment:  POD # 5 s/p CABG x 5, AVR     Plan:  CXR  Pulmonary toilet  Ambulate  ASA, statin, BB  Continue sternal wound precautions and keep beard off incision  D/C home today if cardiology is satisfied with medical management    Frankie Michaud MD  09/09/24  08:47 EDT

## 2024-09-09 NOTE — CASE MANAGEMENT/SOCIAL WORK
Case Management Discharge Note      Final Note: Spoke with patient and family at bedside to discuss discharge plans. Patient's plan is to discharge home today, 9/9. Patient will have Lifevest placed prior to discharge. Denied having any further discharge needs or concerns at this time. Patient states he has transportation home.         Selected Continued Care - Admitted Since 9/3/2024       Destination    No services have been selected for the patient.                Durable Medical Equipment    No services have been selected for the patient.                Dialysis/Infusion    No services have been selected for the patient.                Home Medical Care    No services have been selected for the patient.                Therapy    No services have been selected for the patient.                Community Resources    No services have been selected for the patient.                Community & DME    No services have been selected for the patient.                         Final Discharge Disposition Code: 01 - home or self-care

## 2024-09-09 NOTE — NURSING NOTE
Patient d/c teaching done at bedside with pt, spouse and son, life vest placed before patient went home. PRN pain med admin before leaving. D/c paper work sent home with spouse, three medication transcript transferred to patient home pharmacy.

## 2024-09-09 NOTE — PROGRESS NOTES
[facility] Medicine Services  PROGRESS NOTE    Patient Name: Hossein Gama  : 1950  MRN: [id number]    Date of Admission: 9/3/2024  Primary Care Physician: Charlie Turner MD    Subjective   Subjective     CC:  Medical management     HPI:  Patient seen resting up in the chair no acute distress.  Awake and alert.  Wife at bedside.  States he feels okay.  Eager to go home today.  Denies nausea, vomiting, pain, shortness of breath.  Moderating diet.  Did complain of a nosebleed this morning that has since resolved spontaneously.  Ambulating       Objective   Objective     Vital Signs:   Temp:  [97.5 °F (36.4 °C)-98.4 °F (36.9 °C)] 98.4 °F (36.9 °C)  Heart Rate:  [82-89] 89  Resp:  [16-18] 18  BP: (110-133)/(67-81) 110/67     Physical Exam:  Constitutional: No acute distress, awake, alert.  Resting up in the chair.  Wife at bedside.  HENT: NCAT, mucous membranes moist.  Poor dentition.  Respiratory: Clear to auscultation bilaterally but slightly decreased at bases, respiratory effort normal on room air.  Sats WNL.  Cardiovascular: RRR, no murmurs, rubs, or gallops  Gastrointestinal: Positive bowel sounds, soft, nontender, nondistended  Musculoskeletal: Trace bilateral ankle edema.  Doan spontaneously.  Psychiatric: Appropriate affect, cooperative  Neurologic: Oriented x 3, strength symmetric in all extremities, Cranial Nerves grossly intact to confrontation, speech clear.  Follows commands.  Skin: No rashes, midline vertical sternal incision YARIEL/ well approximated. Old epigastric MT sites c/d/I.  Right LE VHS YARIEL c/d/I.     Results Reviewed:  LAB RESULTS:      Lab 24  0518 24  0651 24  0444 24  0421 24  0407 24  1817 24  1432 24  0711 24  0638   WBC  --  8.15 11.70* 14.16* 12.50* 15.95* 16.36*   < >  --    HEMOGLOBIN 10.6* 10.5*  10.7* 11.1* 11.1* 11.9* 12.3* 13.4   < >  --    HEMOGLOBIN, POC  --   --   --   --   --   --   --     < >  --    HEMATOCRIT 31.0* 31.4*  31.9* 33.1* 33.0* 36.0* 37.4* 40.2   < >  --    HEMATOCRIT POC  --   --   --   --   --   --   --    < >  --    PLATELETS  --  135* 97* 98* 118* 117* 109*   < >  --    NEUTROS ABS  --  5.33  --   --  9.45*  --   --   --   --    IMMATURE GRANS (ABS)  --  0.05  --   --  0.12*  --   --   --   --    LYMPHS ABS  --  1.43  --   --  1.38  --   --   --   --    MONOS ABS  --  1.16*  --   --  1.38*  --   --   --   --    EOS ABS  --  0.17  --   --  0.14  --   --   --   --    MCV  --  88.7 87.6 87.8 88.7 89.0 87.6   < >  --    PROTIME  --   --   --   --  22.2*  --  21.7*  --  14.9*   APTT  --   --   --   --   --   --  36.1  --  27.8    < > = values in this interval not displayed.         Lab 09/09/24  0518 09/08/24  2322 09/08/24  0651 09/07/24  2358 09/07/24  1558 09/07/24  0902 09/06/24  0444 09/05/24  0454 09/04/24  0407 09/03/24  1817 09/03/24  1432   SODIUM 135*  --  136  --   --  134* 132* 131* 141 144 142   POTASSIUM 4.2 4.2 3.6 4.0 3.9 3.8 3.9 4.4 4.5 4.5 4.0   CHLORIDE 103  --  103  --   --  101 99 98 110* 112* 112*   CO2 18.0*  --  22.0  --   --  21.0* 21.0* 20.0* 21.0* 19.0* 20.0*   ANION GAP 14.0  --  11.0  --   --  12.0 12.0 13.0 10.0 13.0 10.0   BUN 13  --  17  --   --  18 17 18 14 11 11   CREATININE 0.47*  --  0.50*  --   --  0.55* 0.65* 0.67* 0.83 0.81 0.88   EGFR 110.4  --  108.4  --   --  105.3 100.1 99.2 93.0 93.7 91.4   GLUCOSE 73  --  69  --   --  105* 91 182* 191* 168* 94   CALCIUM 8.3*  --  8.3*  --   --  8.6 8.9 8.4* 8.3* 8.9 8.9   IONIZED CALCIUM  --   --   --   --   --   --   --   --   --   --  1.31   MAGNESIUM  --   --   --   --   --   --   --   --  2.0 2.2 2.4   PHOSPHORUS  --   --   --   --   --   --   --   --   --  3.0 2.7         Lab 09/04/24  0407 09/03/24  1817 09/03/24  1432   TOTAL PROTEIN 5.4*  --   --    ALBUMIN 3.9 4.3 3.4*   GLOBULIN 1.5  --   --    ALT (SGPT) 11  --   --    AST (SGOT) 59*  --   --    BILIRUBIN 1.4*  --   --    ALK PHOS 66  --   --           Lab 09/04/24  0407 09/03/24  1432 09/03/24  0638   PROTIME 22.2* 21.7* 14.9*   INR 1.94* 1.88* 1.16*         Lab 09/04/24  0407   CHOLESTEROL 58   LDL CHOL 11   HDL CHOL 27*   TRIGLYCERIDES 106         Lab 09/03/24  0649   ABO TYPING A   RH TYPING Positive   ANTIBODY SCREEN Negative         Lab 09/03/24  1915 09/03/24  1831 09/03/24  1458   PH, ARTERIAL 7.329* 7.300* 7.384   PCO2, ARTERIAL 40.1 39.0 34.4*   PO2 ART 69.8* 64.3* 160.0*   FIO2 50 40 100   HCO3 ART 21.1 19.2* 20.5   BASE EXCESS ART -4.6* -6.8* -3.8*   CARBOXYHEMOGLOBIN 1.3 1.3 1.1     Brief Urine Lab Results       None            Microbiology Results Abnormal       None            XR Chest 1 View    Result Date: 9/9/2024  XR CHEST 1 VW Date of Exam: 9/9/2024 9:39 AM EDT Indication: postop Comparison: Chest radiograph 9/6/2024. Findings: Sternotomy. Enlarged cardiac silhouette, unchanged. Lungs are clear without focal consolidation. No sizable pleural effusion. No distinct pneumothorax. Osseous structures are unchanged.     Impression: Impression: No acute cardiopulmonary findings. Electronically Signed: Abhinav Dubose MD  9/9/2024 10:10 AM EDT  Workstation ID: [id number]     Results for orders placed during the hospital encounter of 09/03/24    Adult Transthoracic Echo Limited W/ Cont if Necessary Per Protocol    Interpretation Summary    Left ventricular systolic function is moderately decreased. Left ventricular ejection fraction appears to be 31 - 35%.    Left ventricular wall thickness is consistent with mild septal asymmetric hypertrophy.      Current medications:  Scheduled Meds:aspirin, 81 mg, Oral, Daily  atorvastatin, 80 mg, Oral, Nightly  clopidogrel, 75 mg, Oral, Daily  empagliflozin, 10 mg, Oral, Daily  famotidine, 20 mg, Oral, BID AC  insulin lispro, 2-9 Units, Subcutaneous, 4x Daily AC & at Bedtime  lactobacillus acidophilus, 1 capsule, Oral, Daily  lisinopril, 2.5 mg, Oral, Q24H  melatonin, 5 mg, Oral, Nightly  metoprolol tartrate,  12.5 mg, Oral, Q12H  oxymetazoline, 2 spray, Each Nare, BID  pharmacy consult - MTM, , Does not apply, Daily  sodium chloride, 10 mL, Intravenous, Q12H      Continuous Infusions:   PRN Meds:.  acetaminophen    [DISCONTINUED] senna-docusate sodium **AND** polyethylene glycol **AND** bisacodyl **AND** bisacodyl    Calcium Replacement - Follow Nurse / BPA Driven Protocol    dextrose    dextrose    glucagon (human recombinant)    HYDROcodone-acetaminophen    hydrOXYzine    loperamide    Magnesium Cardiology Dose Replacement - Follow Nurse / BPA Driven Protocol    nitroglycerin    oxyCODONE    Phosphorus Replacement - Follow Nurse / BPA Driven Protocol    Potassium Replacement - Follow Nurse / BPA Driven Protocol    sodium chloride    sodium chloride    Assessment & Plan   Assessment & Plan     Active Hospital Problems    Diagnosis  POA    **CAD s/p CABG x 5, AVR (23 mm Inspiris) 9/3/24 [I25.10]  Unknown    Benign essential HTN [I10]  Unknown    Congestive heart failure with left ventricular dysfunction [I50.9]  Unknown    Type 2 diabetes mellitus [E11.9]  Unknown    Aortic stenosis [I35.0]  Unknown    Dyslipidemia [E78.5]  Unknown      Resolved Hospital Problems   No resolved problems to display.        Brief Hospital Course to date:  Hossein Gama is a 74 y.o. male with PMH of HTN, DM, HLD, AS and CAD presented to [facility] for CABG and aortic valve replacement by Dr Michaud on 9/3/2024.     This patient's problems and plans were partially entered by my partner and updated as appropriate by me 09/09/24.    Assessment/Plan:  Pt is new to me today     NSTEMI  CAD  HTN  HFrEF  --s/p CABG x 5 and aortic valve replacement on 9/3  --CTS and Cardiology follows   --Post op care per CTS/cards  --ASA, Plavix, statin, beta blocker, Jardiance  --post op ECHO with EF 31-35%     DM   --cont MDSSI  --A1c 7.5%  --cont Metformin at [place].  Generally stable.     Diarrhea   --likely due to recent BID stool softeners  --cont to hold stool  softeners and monitor  --no indication to think this is infectious, no abd pain, no fever, normal WBC (patient has been here too long to add GI PCR)  --added Imodium, probiotic and increased fiber in diet  --Diarrhea resolved.  No abdominal pain, nausea or vomiting.     Insomnia  --added melatonin and Atarax PRN for sleep.  Can continue over-the-counter outpatient as needed.    Recommend close follow-up with PCP on discharge and approximately 1 week for further management of chronic medical illnesses      Expected Discharge Location and Transportation: home soon per primary team, possibly today  Expected Discharge   Expected Discharge Date: 9/9/2024; Expected Discharge Time:      VTE Prophylaxis:  Mechanical VTE prophylaxis orders are present.         AM-PAC 6 Clicks Score (PT): 18 (09/09/24 0813)    CODE STATUS:   Code Status and Medical Interventions: CPR (Attempt to Resuscitate); Full Support   Ordered at: 09/03/24 1430     Code Status (Patient has no pulse and is not breathing):    CPR (Attempt to Resuscitate)     Medical Interventions (Patient has pulse or is breathing):    Full Support       Selam Li, APRN  09/09/24

## 2024-09-09 NOTE — DISCHARGE INSTRUCTIONS
Monitor surgical wounds daily. Keep incisons clean and dry at all times.  Call CT Surgery office [phone]  with any questions or concerns, specifically let them know of increased redness, drainage, or opening up of incision.

## 2024-09-09 NOTE — PROGRESS NOTES
"          Clinical Nutrition Assessment     Patient Name: Hossein Gama  YOB: 1950  MRN: [id number]  Date of Encounter: 09/09/24 11:59 EDT  Admission date: 9/3/2024  Reason for Visit: Follow-up protocol    Assessment   Nutrition Assessment   Admission Diagnosis:  Coronary artery disease involving native coronary artery of native heart with angina pectoris [I25.119]  CAD (coronary artery disease) [I25.10]    Problem List:    CAD s/p CABG x 5, AVR (23 mm Inspiris) 9/3/24    Benign essential HTN    Congestive heart failure with left ventricular dysfunction    Type 2 diabetes mellitus    Aortic stenosis    Dyslipidemia      PMH:   He  has a past medical history of Bladder infection, CAD (coronary artery disease) (07/29/2024), Chest pain, CHF (congestive heart failure), Deep vein thrombosis (1998), Diabetes, GERD (gastroesophageal reflux disease), Glaucoma, Headache, Heart failure (07/29/2024), Hyperlipidemia, Hypertension, Joint pain, Light headed, Myocardial infarction, Pneumonia, Psoriasis, and Tinnitus.    PSH:  He  has a past surgical history that includes Coronary stent placement; Cholecystectomy; Cyst Removal (Left); and coronary artery bypass graft with aortic valve repair/replacement (N/A, 9/3/2024).    Applicable Nutrition History:   CHF  T2DM  Prev MI  Poor dentition, no dentures    9/3 s/p CABG x5    Anthropometrics     Height: Height: 172.7 cm (67.99\") (Per Charline Shane charting on 8-23-24)  Last Filed Weight: Weight: 87.5 kg (193 lb) (09/07/24 0500)  Method: Weight Method: Bed scale  BMI: BMI (Calculated): 29.4    UBW:  200# per patient; unsure last time weighed  Weight change: Patient reported noticing his clothes are fitting more lose, but is unsure of amount of weight loss.    Nutrition Focused Physical Exam    Date:  09/09/24        Pt does not meet criteria for malnutrition diagnosis, at this time.      Subjective   Reported/Observed/Food/Nutrition Related History: "     09/09/24   Patient reported his appetite and PO intake has been increasing since previous visit. Stated he does not have many good teeth left, but said he gets by okay. Agreeable to trial soft to chew whole meat. NKFA.    Current Nutrition Prescription   PO: Diet: Cardiac, Diabetic, Gastrointestinal; Healthy Heart (2-3 Na+); Consistent Carbohydrate; High Fiber; Fluid Consistency: Thin (IDDSI 0)  Oral Nutrition Supplement: n/a  Intake: 9/8 L 50, D 100% PO intake documented since previous visit    Assessment & Plan   Nutrition Diagnosis   Date:  09/09/24            Updated:    Problem Biting/chewing difficulty    Etiology Poor dentition   Signs/Symptoms Need for modified diet   Status: new    Goal:   Nutrition to support treatment and Continue positive trend    Nutrition Intervention      Follow treatment progress, Care plan reviewed, Interview for preferences, Menu adjusted, Encourage intake    Patient does not meet malnutrition criteria at this time.  Continue to follow to ensure adequate intake  Modifying diet to soft to chew, whole meat     Monitoring/Evaluation:   Per protocol, PO intake, Weight, Symptoms, POC/GOC    Yareli Vasques, MS,RD,LD  Time Spent: 30min

## 2024-09-09 NOTE — PROGRESS NOTES
"[facility]  IP Progress Note    PROBLEM LIST:  CARDIAC   Coronary Artery Disease:   Remote stents in 1998   Stress echo 11/2020: apical,septal and mid anteroseptal hypokinesis   [facility] 7/2024: Severe diffusely disease   CABG x 5, 9/3/2024: LIMA-LAD, VG-PDA, OM 2, OM 3, D2     Myocardium:   Echo 11/2020, Bri: EF 55-60%   EF 7/16/2024:30%-35%     Valvular:   Moderate AS   S/p 23mm Inspiris tissue valve (9/3/2024)     Electrical:   NSR     Pericardium:   Normal     VASCULAR   Arterial   Cerebrovascular disease:   Carotid Doppler 8/23/2024: Mild disease    CARDIAC RISK FACTORS   Hypertension   Diabetes   Dyslipidemia     NON-CARDIAC   None     SURGERIES   CABG x 5   AVR       HOSPITAL COURSE:  Patient underwent successful revascularization of the LAD with valvular replacement.      CHIEF COMPLAINTS:  Patient has been doing fine.  Patient is eating to go home.      Subjective   Doing fine enjoying his lunch.  He did have a nosebleed earlier this morning.      Objective     Blood pressure 112/69, pulse 87, temperature 98.3 °F (36.8 °C), temperature source Axillary, resp. rate 16, height 172.7 cm (67.99\"), weight 87.5 kg (193 lb), SpO2 100%.     Intake/Output Summary (Last 24 hours) at 9/9/2024 0821  Last data filed at 9/8/2024 1200  Gross per 24 hour   Intake 250 ml   Output 450 ml   Net -200 ml       PHYSICAL EXAM:  Constitutional:       General: Not in acute distress.     Appearance: Healthy appearance. Not in distress.     Neck:     JVP:Not elevated     Carotid artery: Normal    Pulmonary:      Effort: Pulmonary effort is normal.      Breath sounds: Normal breath sounds. No wheezing. No rhonchi. No rales.     Cardiovascular:      Normal rate. Regular rhythm. Normal S1. Normal S2.      Murmurs: There is mild systolic murmur.      No gallop. No click. No rub.     Abdominal:      General: Bowel sounds are normal.      Palpations: Abdomen is soft.      Tenderness: There is " no abdominal tenderness.    Extremities:     Pulses:Normal radial and pedal pulses     Edema:no edema    MEDICATIONS:    aspirin, 81 mg, Oral, Daily  atorvastatin, 80 mg, Oral, Nightly  clopidogrel, 75 mg, Oral, Daily  empagliflozin, 10 mg, Oral, Daily  famotidine, 20 mg, Oral, BID AC  insulin lispro, 2-9 Units, Subcutaneous, 4x Daily AC & at Bedtime  lactobacillus acidophilus, 1 capsule, Oral, Daily  melatonin, 5 mg, Oral, Nightly  metoprolol tartrate, 12.5 mg, Oral, Q12H  pharmacy consult - MTM, , Does not apply, Daily  protamine, 50 mg, Intravenous, Once  sodium chloride, 10 mL, Intravenous, Q12H          Results from last 7 days   Lab Units 09/09/24  0518 09/08/24  0651   WBC 10*3/mm3  --  8.15   HEMOGLOBIN g/dL 10.6* 10.5*  10.7*   HEMATOCRIT % 31.0* 31.4*  31.9*   PLATELETS 10*3/mm3  --  135*     Results from last 7 days   Lab Units 09/09/24  0518 09/05/24  0454 09/04/24  0407   SODIUM mmol/L 135*   < > 141   POTASSIUM mmol/L 4.2   < > 4.5   CHLORIDE mmol/L 103   < > 110*   CO2 mmol/L 18.0*   < > 21.0*   BUN mg/dL 13   < > 14   CREATININE mg/dL 0.47*   < > 0.83   CALCIUM mg/dL 8.3*   < > 8.3*   BILIRUBIN mg/dL  --   --  1.4*   ALK PHOS U/L  --   --  66   ALT (SGPT) U/L  --   --  11   AST (SGOT) U/L  --   --  59*   GLUCOSE mg/dL 73   < > 191*    < > = values in this interval not displayed.     Results from last 7 days   Lab Units 09/04/24  0407 09/03/24  1432 09/03/24  0638   INR  1.94* 1.88* 1.16*     Lab Results   Component Value Date    TROPONINT 27 (H) 07/29/2024         Results from last 7 days   Lab Units 09/04/24  0407   CHOLESTEROL mg/dL 58   TRIGLYCERIDES mg/dL 106   HDL CHOL mg/dL 27*   LDL CHOL mg/dL 11           Hemoglobin A1C   Date Value Ref Range Status   08/23/2024 7.50 (H) 4.80 - 5.60 % Final     Magnesium   Date Value Ref Range Status   09/04/2024 2.0 1.6 - 2.4 mg/dL Final        RESULT REVIEW:    I reviewed the patient's new clinical results.    Tele: Sinus Rythym      ASSESSMENT:      Coronary artery disease status postcoronary bypass graft  Aortic valve replacement  Cardiomyopathy  Hypertension  Hyperlipidemia    PLAN:     Patient has been started on aspirin and Plavix.  Patient did have some nosebleed.  I have advised him to stop his Plavix if he continues to have nosebleeds.  We will continue him on high-dose of statins.  He is on beta-blocker and SGLT2's.  I will start him on a low-dose of lisinopril.  Should be able to go home after [facility] is arranged.

## 2024-09-10 NOTE — DISCHARGE SUMMARY
[facility] Cardiothoracic Surgery  DISCHARGE SUMMARY    Patient Name: Hossein Gama  : 1950  MRN: [id number]    Date of Admission: 9/3/2024  4:55 AM  Date of Discharge:  2024  Primary Care Physician: Charlie Turner MD  Referred By: Frankie Michaud MD    IP Care Team:  Patient Care Team:  Charlie Turner MD as PCP - General (Family Medicine)  Amee Jacobs MD as Cardiologist (Cardiology)    Consults       Date and Time Order Name Status Description    2024  7:58 AM Inpatient Consult to Hospitalist KAILEY for Medical Management Completed     9/3/2024  2:30 PM Inpatient Consult to Cardiology Completed             Hospital Course     Presenting Problem: Coronary artery disease/aortic stenosis    Active Hospital Problems    Diagnosis  POA    **CAD s/p CABG x 5, AVR (23 mm Inspiris) 9/3/24 [I25.10]  Unknown    Benign essential HTN [I10]  Unknown    Congestive heart failure with left ventricular dysfunction [I50.9]  Unknown    Type 2 diabetes mellitus [E11.9]  Unknown    Aortic stenosis [I35.0]  Unknown    Dyslipidemia [E78.5]  Unknown      Resolved Hospital Problems   No resolved problems to display.        Procedures Performed:  Procedure(s):  MEDIAN STERNOTOMY, CORONARY ARTERY BYPASS GRAFTING X5 UTILIZING THE LEFT INTERNAL MAMMERY ARTERY, EVH OF THE GREATER RIGHT SAPHENOUS VEIN, AORTIC VALVE REPLACEMENT, EXPLORATION OF THE LEFT RADIAL ARTERY AND YOLA PER ANESTHESIA       Hospital Course:  Hossein Gama is a 74 y.o. male who was admitted to [facility] on 9/3/2024 and underwent CABG x 5 with EVH of the right greater saphenous, and bioprosthetic AVR with number 23 mm Inspiris tissue valve with Dr. Michaud.  He was taken ICU in stable condition and was extubated per ICU protocol.  He met criteria for discharge on POD #5 and was discharged home in stable condition.  His hospital course is below.    CAD s/p CABG x 5, left radial artery harvest, AVR:  : Chest  tubes and pacing wires were removed. Transfer to telemetry orders were placed.  9/7: Transferred to telemetry floor.  ASA, Statin, BB, Plavix    Epistaxis:  Started on 9/9 after starting Plavix. Was instructed to hold Plavix if he continues to have nosebleeds    HFrEF:  Preop EF 36 to 40%  Intraoperative EF 40%  Postop EF 31 to 35%  Discharged with LifeVest     Discharge Follow Up Recommendations for outpatient labs/diagnostics:  Follow-up with heart valve in 1 week  Follow-up with PCP 1 to 2 weeks  Follow-up with cardiology in 4 weeks  Follow-up with CT surgery in 6 weeks with CXR        DO NOT REMOVE SUTURES AND/OR STAPLES THIS WILL BE ADDRESSED AT CT SURGERY FOLLOW-UP  If you have any questions or concerns please reach out to our office at [phone]    Day of Discharge     HPI:   Hossein Gama is a 73 y.o. gentleman referred to Dr. Michaud per Dr. Jacobs for CAD.  PMH: Hypertension, type 2 diabetes, dyslipidemia, mild aortic stenosis, and multivessel CAD with ejection fraction 30 to 35%.  Cardiac history includes remote PCI 1998 followed most recently by left heart cath at [place] July 2024: LAD diffusely diseased with 80% stenosis, mid LAD 70% tubular stenosis, OM2 occluded with bridging collaterals filling the mid and distal vessel, small to moderate OM 3 subtotally occluded with 99% stenosis and total occlusion of the RCA.  Patient had originally been referred for CABG at outside hospital but presented to [facility] ER 7/29/2024 at which time Dr. Jacobs performed cardiology consultation.  Outpatient consultation with Dr. Michaud was arranged.  Since the ER visit, patient has continued to experience intermittent chest pain.  Medical management includes aspirin, statin, losartan, Coreg, and Aldactone.  Dr. Jacobs arrange dobutamine stress echo 7/29/2024 which did confirm mild aortic stenosis with aortic mean gradient 14 mmHg at 20 mics per minute dobutamine.  History of chest wall radiation.  No history of lower  "extremity PVD procedures or vein stripping.  He did suffer trauma/open fracture left lower extremity years ago.         Vital Signs:   Temp:  [98.4 °F (36.9 °C)] 98.4 °F (36.9 °C)  Heart Rate:  [82-90] 90  Resp:  [18] 18  BP: (110-112)/(67-81) 110/67      Physical Exam:  General Appearance: alert, appears stated age and cooperative              Lungs: clear to auscultation, respirations regular, respirations even, and respirations unlabored              Heart: regular rhythm & normal rate, normal S1, S2, no murmur, no gallop, no rub, and no click              Skin: Incision c/d/I              Sternum: Stable     Pertinent  and/or Most Recent Results     LAB RESULTS:    Operative Pathology:    Final Diagnosis   AORTIC VALVE LEAFLET:  Soft tissue with focal nodular fibrosis and calcification.  No acute inflammation/vegetation identified.   Electronically signed by Angel Gant MD on 9/5/2024 at 0940   Gross Description    1. Aortic Leaflet.  Received in formalin labeled \"aortic leaflet\" is a aggregate of tan-white soft tissue fragments measuring 3.5 x 2.4 x 0.7 cm.  The leaflets contain tan-yellow areas of calcifications comprising 20% of the specimen.  Representative sections of the calcified areas are submitted in A1, following decalcification. [place]           Lab 09/09/24  0518 09/08/24  0651 09/06/24  0444 09/05/24  0421 09/04/24  0407 09/03/24  1817 09/03/24  1432   WBC  --  8.15 11.70* 14.16* 12.50* 15.95* 16.36*   HEMOGLOBIN 10.6* 10.5*  10.7* 11.1* 11.1* 11.9* 12.3* 13.4   HEMATOCRIT 31.0* 31.4*  31.9* 33.1* 33.0* 36.0* 37.4* 40.2   PLATELETS  --  135* 97* 98* 118* 117* 109*   NEUTROS ABS  --  5.33  --   --  9.45*  --   --    IMMATURE GRANS (ABS)  --  0.05  --   --  0.12*  --   --    LYMPHS ABS  --  1.43  --   --  1.38  --   --    MONOS ABS  --  1.16*  --   --  1.38*  --   --    EOS ABS  --  0.17  --   --  0.14  --   --    MCV  --  88.7 87.6 87.8 88.7 89.0 87.6   PROTIME  --   --   --   --  22.2*  --  " 21.7*   APTT  --   --   --   --   --   --  36.1         Lab 09/09/24  0518 09/08/24  2322 09/08/24  0651 09/07/24  2358 09/07/24  1558 09/07/24  0902 09/06/24  0444 09/05/24  0454 09/04/24  0407 09/03/24 1817 09/03/24  1432   SODIUM 135*  --  136  --   --  134* 132* 131* 141 144 142   POTASSIUM 4.2 4.2 3.6 4.0 3.9 3.8 3.9 4.4 4.5 4.5 4.0   CHLORIDE 103  --  103  --   --  101 99 98 110* 112* 112*   CO2 18.0*  --  22.0  --   --  21.0* 21.0* 20.0* 21.0* 19.0* 20.0*   ANION GAP 14.0  --  11.0  --   --  12.0 12.0 13.0 10.0 13.0 10.0   BUN 13  --  17  --   --  18 17 18 14 11 11   CREATININE 0.47*  --  0.50*  --   --  0.55* 0.65* 0.67* 0.83 0.81 0.88   EGFR 110.4  --  108.4  --   --  105.3 100.1 99.2 93.0 93.7 91.4   GLUCOSE 73  --  69  --   --  105* 91 182* 191* 168* 94   CALCIUM 8.3*  --  8.3*  --   --  8.6 8.9 8.4* 8.3* 8.9 8.9   IONIZED CALCIUM  --   --   --   --   --   --   --   --   --   --  1.31   MAGNESIUM  --   --   --   --   --   --   --   --  2.0 2.2 2.4   PHOSPHORUS  --   --   --   --   --   --   --   --   --  3.0 2.7         Lab 09/04/24 0407 09/03/24 1817 09/03/24  1432   TOTAL PROTEIN 5.4*  --   --    ALBUMIN 3.9 4.3 3.4*   GLOBULIN 1.5  --   --    ALT (SGPT) 11  --   --    AST (SGOT) 59*  --   --    BILIRUBIN 1.4*  --   --    ALK PHOS 66  --   --          Lab 09/04/24  0407 09/03/24  1432   PROTIME 22.2* 21.7*   INR 1.94* 1.88*         Lab 09/04/24  0407   CHOLESTEROL 58   LDL CHOL 11   HDL CHOL 27*   TRIGLYCERIDES 106             Lab 09/03/24  1915 09/03/24  1831 09/03/24  1458   PH, ARTERIAL 7.329* 7.300* 7.384   PCO2, ARTERIAL 40.1 39.0 34.4*   PO2 ART 69.8* 64.3* 160.0*   FIO2 50 40 100   HCO3 ART 21.1 19.2* 20.5   BASE EXCESS ART -4.6* -6.8* -3.8*   CARBOXYHEMOGLOBIN 1.3 1.3 1.1     Brief Urine Lab Results       None          Microbiology Results (last 10 days)       ** No results found for the last 240 hours. **            XR Chest 1 View    Result Date: 9/9/2024  XR CHEST 1 VW Date of Exam:  9/9/2024 9:39 AM EDT Indication: postop Comparison: Chest radiograph 9/6/2024. Findings: Sternotomy. Enlarged cardiac silhouette, unchanged. Lungs are clear without focal consolidation. No sizable pleural effusion. No distinct pneumothorax. Osseous structures are unchanged.     Impression: No acute cardiopulmonary findings. Electronically Signed: Abhinav Dubose MD  9/9/2024 10:10 AM EDT  Workstation ID: [id number]    Adult Transthoracic Echo Limited W/ Cont if Necessary Per Protocol    Result Date: 9/6/2024    Left ventricular systolic function is moderately decreased. Left ventricular ejection fraction appears to be 31 - 35%.   Left ventricular wall thickness is consistent with mild septal asymmetric hypertrophy.     XR Chest 1 View    Result Date: 9/6/2024  XR CHEST 1 VW Date of Exam: 9/6/2024 3:07 AM EDT Indication: s/p chest tube removal Comparison: Chest radiograph 9/5/2024. Findings: Removal of right IJ approach central venous catheter and chest tubes. Sternotomy. Cardiomediastinal silhouette is unchanged. Low lung volumes with bibasilar subsegmental atelectasis and trace bilateral pleural effusions, slightly decreased. Questionable minimal/trace right pneumothorax. Osseous structures are unchanged.     Impression: Questionable minimal/trace right pneumothorax. Slightly improved trace bilateral pleural effusions and basilar atelectasis. Electronically Signed: Abhinav Dubose MD  9/6/2024 7:45 AM EDT  Workstation ID: [id number]    XR Chest 1 View    Result Date: 9/5/2024  XR CHEST 1 VW Date of Exam: 9/5/2024 2:46 AM EDT Indication: Post-Op Heart Surgery Comparison: 9/4/2024 Findings: Cardiomediastinal silhouette is enlarged, stable. There are low lung volumes with interstitial thickening and bibasilar opacities. Small bilateral pleural effusions are again suspected. There is no pneumothorax. Right internal jugular central venous catheter is stable. Thoracostomy tubes are stable.     Impression: Stable appearance of  the chest with cardiomegaly, low lung volumes, bibasilar opacities and small bilateral pleural effusions. Electronically Signed: Swati Moreno MD  9/5/2024 7:14 AM EDT  Workstation ID: [id number]    XR Chest 1 View    Result Date: 9/4/2024  XR CHEST 1 VW Date of Exam: 9/4/2024 4:21 AM EDT Indication: Post-Op Heart Surgery Comparison: Chest x-ray 9/3/2024 Findings: Interval extubation. Removal of NG/OG tube. Right IJ introducer sheath and CVC remains in place. Thoracostomy tubes remain in place. Mild decrease in lung volumes with bibasilar opacities, worsened on the right, likely atelectasis. Small pleural effusions are difficult to exclude. No pneumothorax. Stable cardiomegaly.     Impression: Extubation and removal of NG/OG tube. Mild decrease in lung volumes with worsened bibasilar parenchymal opacities which may reflect atelectasis. Small bilateral pleural effusions are difficult to exclude. Electronically Signed: Modesto Virgen MD  9/4/2024 7:29 AM EDT  Workstation ID: [id number]    XR Chest 1 View    Result Date: 9/3/2024  XR CHEST 1 VW Date of Exam: 9/3/2024 2:35 PM EDT Indication: Post-Op Check Line & Tube Placement Comparison: 8/23/2024. Findings: Endotracheal tube projects in good position above the jaydon. Mediastinal drain noted in place. Pulmonary artery catheter floated in the SVC. Nasogastric tube terminates in the stomach. Sternotomy wires appear intact. Some minimal scattered atelectasis is present as well as central vascular congestion. There is no distinct pneumothorax or significant pleural effusion. Sternotomy wires appear intact.     Impression: Endotracheal tube projects in good position above the jaydon. Mediastinal drain noted in place. Pulmonary artery catheter floated in the SVC. Nasogastric tube terminates in the stomach. Sternotomy wires appear intact. Some minimal scattered atelectasis is present as well as central vascular congestion. There is no distinct pneumothorax or significant  pleural effusion. Sternotomy wires appear intact. Electronically Signed: Donnie Busch MD  9/3/2024 2:49 PM EDT  Workstation ID: [id number]    Central Line    Result Date: 9/3/2024  Edwina Mcmahon MD     9/3/2024  8:12 AM Central Line Patient reassessed immediately prior to procedure Patient location during procedure: OR Start time: 9/3/2024 7:20 AM Indications: central pressure monitoring, vascular access and MD/Surgeon request Staff Anesthesiologist: Edwina Mcmahon MD Preanesthetic Checklist Completed: patient identified, IV checked, site marked, risks and benefits discussed, surgical consent, monitors and equipment checked, pre-op evaluation and timeout performed Central Line Prep Sterile Tech:cap, gloves, gown, mask and sterile barriers Prep: chloraprep Patient monitoring: blood pressure monitoring, continuous pulse oximetry and EKG Central Line Procedure Laterality:right Location:internal jugular Catheter Type:MAC Catheter Size:9 Fr Guidance:ultrasound guided PROCEDURE NOTE/ULTRASOUND INTERPRETATION.  Using ultrasound guidance the potential vascular sites for insertion of the catheter were visualized to determine the patency of the vessel to be used for vascular access.  After selecting the appropriate site for insertion, the needle was visualized under ultrasound being inserted into the internal jugular vein, followed by ultrasound confirmation of wire and catheter placement. There were no abnormalities seen on ultrasound; an image was taken; and the patient tolerated the procedure with no complications. Images: still images obtained, printed/placed on chart Assessment Post procedure:biopatch applied, line sutured, occlusive dressing applied and secured with tape Assessement:blood return through all ports, free fluid flow, chest x-ray ordered and Thomas Test Complications:no Patient Tolerance:patient tolerated the procedure well with no apparent complications     Arterial Line    Result Date:  9/3/2024  Edwina Mcmahon MD     9/3/2024  8:48 AM Arterial Line Patient reassessed immediately prior to procedure Patient location during procedure: pre-op Start time: 9/3/2024 6:45 AM Line placed for hemodynamic monitoring. Performed By Anesthesiologist: Edwina Mcmahon MD Preanesthetic Checklist Completed: patient identified, IV checked, site marked, risks and benefits discussed, surgical consent, monitors and equipment checked, pre-op evaluation and timeout performed Arterial Line Prep  Sterile Tech: cap, gloves and sterile barriers Prep: ChloraPrep Patient monitoring: blood pressure monitoring, continuous pulse oximetry and EKG Arterial Line Procedure Laterality:right Location:  radial artery Catheter size: 20 G Guidance: ultrasound guided and palpation technique Number of attempts: 1 Successful placement: yes Images: still images not obtained Post Assessment Dressing Type: line sutured, occlusive dressing applied, secured with tape and wrist guard applied. Complications no Circ/Move/Sens Assessment: normal and unchanged. Patient Tolerance: patient tolerated the procedure well with no apparent complications     Intra-Op Anesthesia YOLA    Result Date: 9/3/2024  Edwina Mcmahon MD     9/3/2024  1:54 PM Intra-Op Anesthesia YOLA Procedure Performed: Intra-Op Anesthesia YOLA      Start Time:      End Time:  General Procedure Information Diagnostic Indications for Echo:  assessment of surgical repair and hemodynamic monitoring Physician Requesting Echo: Frankie Michaud MD Location performed:  OR Intubated Bite block placed Heart visualized Probe Insertion:  Easy Probe Type:  Multiplane Modalities:  2D only, color flow mapping, continuous wave Doppler and pulse wave Doppler Echocardiographic and Doppler Measurements Ventricles Right Ventricle: Cavity size normal.  Hypertrophy not present.  Thrombus not present.  Global function normal.  Left Ventricle: Cavity size normal.  Thrombus not present.  Global  Function mildly impaired.  Ejection Fraction 40%.  Valves Aortic Valve: Annulus calcified.  Stenosis moderate.  Area: 1.05 cm².  Mean Gradient: 17/10 mmHg.  Regurgitation mild.  Leaflets calcified.  Leaflet motions restricted.  Mitral Valve: Annulus calcified.  Stenosis not present.  Regurgitation moderate.  Leaflets calcified and thickened.  Leaflet motions normal.  Tricuspid Valve: Annulus normal.  Stenosis not present.  Regurgitation mild.  Leaflets normal.  Leaflet motions normal.  Pulmonic Valve: Annulus normal.  Stenosis not present.  Regurgitation mild.  Aorta Ascending Aorta: Size normal.  Dissection not present.  Plaque thickness greater than 3 mm.  Mobile plaque not present.  Aortic Arch: Size normal.  Dissection not present.  Plaque thickness less than 3 mm.  Mobile plaque not present.  Descending Aorta: Size normal.  Dissection not present.  Plaque thickness less than 3 mm.  Mobile plaque not present.  Atria Right Atrium: Size normal.  Spontaneous echo contrast not present.  Thrombus not present.  Device not present.  Left Atrium: Size normal.  Spontaneous echo contrast not present.  Thrombus not present.  Tumor not present.  Device not present.  Left atrial appendage normal. Septa Ventricular Septum: Intra-ventricular septum morphology normal.  Other Findings Pericardium:  normal Pleural Effusion:  none Pulmonary Venous Flow:  blunted (decreased) systolic flow Anesthesia Information Performed Personally Anesthesiologist:  Edwina Mcmahon MD Echocardiogram Comments:      Findings discussed with Dr. Michaud Prebypass: RV fxn normal, LV fxn mildly-moderately reduced.EF 40-45%. Mild TR, Moderate central MR, MAC and thickened leaflets, mild AI, moderate AS, AV trileaflet, thickened valve, REYNOLD by planimetry 1.04cm2 REYNOLD by continuity 1.05cm2.  Aortic root noted to have calcification at STJ. Post CABG x5  and AVR 23 mm bioprosthetic valve RV fxn and LV fxn unchanged. Post bypass MR mild, trace AI around 6:00  position in SAX; appears to be within valve difficult to assess if paravalvular. Pk/mn pressure through AV 7/4mmHg.  REYNOLD by continuity 2.55cm2 Aorta intact     Results for orders placed during the hospital encounter of 08/23/24    Duplex Carotid Ultrasound CAR    Interpretation Summary    Right internal carotid artery demonstrates a less than 50% stenosis.    Antegrade right vertebral flow.    Left internal carotid artery demonstrates a less than 50% stenosis.    Antegrade left vertebral flow.      Results for orders placed during the hospital encounter of 08/23/24    Duplex Carotid Ultrasound CAR    Interpretation Summary    Right internal carotid artery demonstrates a less than 50% stenosis.    Antegrade right vertebral flow.    Left internal carotid artery demonstrates a less than 50% stenosis.    Antegrade left vertebral flow.      Results for orders placed during the hospital encounter of 09/03/24    Adult Transthoracic Echo Limited W/ Cont if Necessary Per Protocol    Interpretation Summary    Left ventricular systolic function is moderately decreased. Left ventricular ejection fraction appears to be 31 - 35%.    Left ventricular wall thickness is consistent with mild septal asymmetric hypertrophy.          Discharge Details        Discharge Medications        New Medications        Instructions Start Date   clopidogrel 75 MG tablet  Commonly known as: PLAVIX   75 mg, Oral, Daily      empagliflozin 10 MG tablet tablet  Commonly known as: JARDIANCE   10 mg, Oral, Daily      lisinopril 2.5 MG tablet  Commonly known as: PRINIVIL,ZESTRIL  Notes to patient: For Blood Pressure/Heart Health    2.5 mg, Oral, Every 24 Hours Scheduled      metoprolol succinate XL 25 MG 24 hr tablet  Commonly known as: TOPROL-XL   25 mg, Oral, Daily             Continue These Medications        Instructions Start Date   aspirin 325 MG tablet  Notes to patient: For Heart Health    325 mg, Oral, Daily      atorvastatin 20 MG  tablet  Commonly known as: LIPITOR  Notes to patient: For Cholesterol    20 mg, Oral, Daily      metFORMIN  MG 24 hr tablet  Commonly known as: GLUCOPHAGE-XR  Notes to patient: For Blood Sugar/Diabetes    500 mg, Oral, 2 Times Daily With Meals      nitroglycerin 0.4 MG SL tablet  Commonly known as: NITROSTAT  Notes to patient: For Chest Pain    0.4 mg, Sublingual, Every 5 Minutes PRN, Take no more than 2 doses in 15 minutes.             Stop These Medications      carvedilol 6.25 MG tablet  Commonly known as: COREG     furosemide 20 MG tablet  Commonly known as: LASIX     losartan 25 MG tablet  Commonly known as: COZAAR     potassium chloride 10 MEQ CR tablet     spironolactone 25 MG tablet  Commonly known as: ALDACTONE              No Known Allergies      Discharge Disposition:  Home or Self Care    Diet:  Hospital:No active diet order      Activity:  Activity Instructions       Activity as Tolerated      Bathing Restrictions      You may shower    Type of Restriction: Bathing    Bathing Restrictions: No Tub Bath    Driving Restrictions      Type of Restriction: Driving    Driving Restrictions: No Driving While Taking Narcotics    Lifting Restrictions      Type of Restriction: Lifting    Lifting Restrictions: Lifting Restriction (Indicate Limit)    Weight Limit (Pounds): 10    Length of Lifting Restriction: until seen at next follow-up appointment            Restrictions or Other Recommendations:  No driving until seen by your cardiac surgeon at your follow up appointment. Do not drive while taking narcotics. Get up and get dressed each morning; do not stay in bed. Walking is the best form of exercise because it increases circulation throughout the body and to the heart muscle. Get plenty of sleep at night (8-10 hours). It is important for your breast bone (sternum) to heal properly after surgery.   Please follow these guidelines:  -No lifting, pulling, or pushing greater than 10 lbs for 12 weeks.   -Do not  "push or pull with your arms, especially when rising from a chair   -Have someone help you get up or roll to your side, and push off with your elbow to get out of bed.  -Avoid activities that cause you to strain or pull on your chest, such as driving a  or tractor, raking, vacuuming, moving heavy items, shoveling, opening tight jars or swinging a golf club.  -If you have \"clicking\" in your sternum, avoid activities that cause clicking until the sternum heals.  -Canes or walkers may be used only for balance. Do not place your full weight on any of these devices until the chest is completely healed (usually 12 weeks).       CODE STATUS:    Code Status and Medical Interventions: CPR (Attempt to Resuscitate); Full Support   Ordered at: 09/03/24 1430     Code Status (Patient has no pulse and is not breathing):    CPR (Attempt to Resuscitate)     Medical Interventions (Patient has pulse or is breathing):    Full Support       Future Appointments   Date Time Provider Department Center   9/11/2024  3:00 PM Cherry Maher APRN [facility]   11/7/2024 10:00 AM Amee Jacobs MD [facility]       Additional Instructions for the Follow-ups that You Need to Schedule       Call MD With Problems / Concerns   As directed      Instructions:   Please call the CT Surgery office with any questions or concerns you may have [phone]    Order Comments: Instructions: Please call the CT Surgery office with any questions or concerns you may have [phone]         Discharge Follow-up with PCP   As directed       Currently Documented PCP:    Charlie Turner MD    PCP Phone Number:    [phone]     Follow Up Details: Follow Up With PCP Within 7-14 Days        Discharge Follow-up with Specialty: Cardiothoracic Surgery   As directed      Follow Up Details: Follow Up in 4-6 weeks   Specialty: Cardiothoracic Surgery        Discharge Follow-up with Specialty: [facility]; 1 Week   As directed      " Specialty: [facility]   Follow Up: 1 Week   Follow Up Details: Follow Up With [facility] Within 7 Days of Discharge. If Discharged on a Weekend, Schedule Follow Up For The Following Friday at 0900.        Discharge Follow-up with Specified Provider: Reynaldo; 1 Month   As directed      To: Reynaldo   Follow Up: 1 Month        Cardiac Rehab Evaluation and Enrollment   Sep 14, 2024      Reason for Consult: Education                Discharge Education:  Post-Op Education:  Patient was advised on responsible use of opioids in the post-surgical setting.  Patient was advised these medications are highly addictive.  Patient advised on proper disposal of unused opioid medication and was urged to discard any unused opioid medication. I reviewed the patient's sternal precautions and outlined the physical activity suitable for each benchmark at the 6-week 3-month and 1 year intervals.  Wound Care:  Patient educated regarding care of post-operative wounds.  Patient is to wash with plain soap and water and pat dry.  Patient is not to use salves on the incision sites.  Patient instructed regarding the signs and symptoms of infection and when to call the office with any concerns.  SBE Education/Valve Education: Symptoms of acute IE usually begin with fever (102°-104°), chills, fast heart rate, fatigue, night sweats, aching joints and muscles, persistent cough, or swelling in the feet, legs or abdomen. You can reduce the risk of IE by maintaining good oral health through regular professional dental care and the use of dental products such as manual, powered and ultrasonic toothbrushes; dental floss; and other plaque-removal devices. Inform your dentist, family doctor (PCP) or other health care professional prior to any surgeries and/or procedures that you have had heart valve surgery.  Prophylactic antibiotics are always recommended prior to any surgical procedures following heart valve replacement.    Special  Instructions:   1.  Keep incisions clean and dry at all times. Take a shower daily. Do not take a tub bath or use hot tubs until seen by the cardiac surgeon in your follow-up visit. Clean  your body and incisions daily with Dial soap and water. Always use a clean washcloth. Do not scrub your incision(s). Do not re-use dressings on your incision(s). Do not use any lotions, creams, oils, powders, antibiotic ointment (i.e., Neosporin), peroxide, alcohol, or iodine UNLESS told to do by the cardiac surgeon.  Patient may shower, but no tub baths until CT Surgery followup.   2.  No lifting over 10 lbs until CT Surgery follow up.  3.  No driving until released to do so by the surgeon.      Surgical Leg Precautions:   -Avoid sitting in one position or standing for long periods of time.  -Do not cross your legs.  -Keep your legs elevated while sitting  -Do not use any lotions, creams, oils, powders, antibiotic ointment (i.e. Neosporin), peroxide, alcohol, or iodine unless specifically prescribed by the cardiac surgeon.  -If elastic stockings (RACQUEL hose) were prescribed to you, wear the stockings while you are up for at least two weeks after discharge. The stockings help decrease swelling. However, remove stockings at bedtime. Wash the stockings with mild soap and water, and make sure they are completely dry before you put them back on.    When to Call the Cardiac Surgeon:  -Increased swelling, redness, tenderness, and drainage  -Increased warmth in the skin around an incision  -Large amount of clear or pinkish drainage  -Sudden increased amount of drainage  -White, yellow, or greenish drainage  -Odor, which may be foul or sweet, coming from an incision  -An opening in your incisions  -Temperature higher than 101 degrees Fahrenheit   -Temperature higher than 100 degrees Fahrenheit two times within 24 hours  -Do not hesitate to call the cardiac surgery nurse navigator or cardiac surgeon if you have concerns or questions.      *[facility] cardiac surgery nurse navigator is available Monday-Friday 8 a.m. to 4:30 p.m. [phone]  Call 911:  -Chest pain (pain similar to what you experienced prior to surgery)  -Fainting spells  -Bright red stool  -Vomiting bright red blood  -Shortness of breath not relieved by rest  -Sudden numbness or weakness in arms or legs  -Sudden, severe headache  -Heart rate faster than 150 beats/minute with shortness of breath or a new irregular heart rate    Padmini Cagle, KAILEY  09/10/24  09:57 EDT    Time: I spent 35 minutes on this discharge activity which included: face-to-face encounter with the patient, reviewing the data in the system, coordination of the care with the nursing staff as well as consultants, documentation, and entering orders.

## 2024-09-10 NOTE — OUTREACH NOTE
Prep Survey      Flowsheet Row Responses   [facility] facility patient discharged from? [place]   Is LACE score < 7 ? No   Eligibility Readm Mgmt   Discharge diagnosis CABGx5   Does the patient have one of the following disease processes/diagnoses(primary or secondary)? Cardiothoracic surgery   Does the patient have Home health ordered? No   Is there a DME ordered? No   Prep survey completed? Yes            RAJAN WRIGHT - Registered Nurse

## 2024-09-11 ENCOUNTER — HOSPITAL ENCOUNTER (OUTPATIENT)
Dept: GENERAL RADIOLOGY | Facility: HOSPITAL | Age: 74
Discharge: HOME | End: 2024-09-11

## 2024-09-11 ENCOUNTER — OFFICE VISIT (OUTPATIENT)
Dept: CARDIOLOGY | Facility: HOSPITAL | Age: 74
End: 2024-09-11

## 2024-09-11 VITALS
HEART RATE: 106 BPM | OXYGEN SATURATION: 98 % | DIASTOLIC BLOOD PRESSURE: 67 MMHG | WEIGHT: 195 LBS | HEIGHT: 67 IN | BODY MASS INDEX: 30.61 KG/M2 | SYSTOLIC BLOOD PRESSURE: 115 MMHG

## 2024-09-11 DIAGNOSIS — I50.23 ACUTE ON CHRONIC HFREF (HEART FAILURE WITH REDUCED EJECTION FRACTION): Primary | ICD-10-CM

## 2024-09-11 DIAGNOSIS — I10 BENIGN ESSENTIAL HTN: ICD-10-CM

## 2024-09-11 DIAGNOSIS — R35.0 FREQUENT URINATION: ICD-10-CM

## 2024-09-11 DIAGNOSIS — I25.10 CORONARY ARTERY DISEASE INVOLVING NATIVE CORONARY ARTERY OF NATIVE HEART WITHOUT ANGINA PECTORIS: ICD-10-CM

## 2024-09-11 DIAGNOSIS — I25.10 CORONARY ARTERY DISEASE INVOLVING NATIVE CORONARY ARTERY OF NATIVE HEART WITHOUT ANGINA PECTORIS: Primary | ICD-10-CM

## 2024-09-11 LAB
BACTERIA UR QL AUTO: ABNORMAL /HPF
BILIRUB UR QL STRIP: NEGATIVE
CLARITY UR: ABNORMAL
COLOR UR: ABNORMAL
GLUCOSE UR STRIP-MCNC: ABNORMAL MG/DL
HGB UR QL STRIP.AUTO: ABNORMAL
HOLD SPECIMEN: NORMAL
HYALINE CASTS UR QL AUTO: ABNORMAL /LPF
KETONES UR QL STRIP: ABNORMAL
LEUKOCYTE ESTERASE UR QL STRIP.AUTO: ABNORMAL
NITRITE UR QL STRIP: POSITIVE
PH UR STRIP.AUTO: 5.5 [PH] (ref 5–8)
PROT UR QL STRIP: ABNORMAL
RBC # UR STRIP: ABNORMAL /HPF
REF LAB TEST METHOD: ABNORMAL
SP GR UR STRIP: 1.03 (ref 1–1.03)
SQUAMOUS #/AREA URNS HPF: ABNORMAL /HPF
UROBILINOGEN UR QL STRIP: ABNORMAL
WBC # UR STRIP: ABNORMAL /HPF

## 2024-09-11 PROCEDURE — 87088 URINE BACTERIA CULTURE: CPT | Performed by: NURSE PRACTITIONER

## 2024-09-11 PROCEDURE — 71046 X-RAY EXAM CHEST 2 VIEWS: CPT

## 2024-09-11 PROCEDURE — 99214 OFFICE O/P EST MOD 30 MIN: CPT | Performed by: NURSE PRACTITIONER

## 2024-09-11 PROCEDURE — 81001 URINALYSIS AUTO W/SCOPE: CPT | Performed by: NURSE PRACTITIONER

## 2024-09-11 PROCEDURE — 87186 SC STD MICRODIL/AGAR DIL: CPT | Performed by: NURSE PRACTITIONER

## 2024-09-11 PROCEDURE — 87086 URINE CULTURE/COLONY COUNT: CPT | Performed by: NURSE PRACTITIONER

## 2024-09-11 RX ORDER — HYDROCODONE BITARTRATE AND ACETAMINOPHEN 5; 325 MG/1; MG/1
1 TABLET ORAL EVERY 8 HOURS PRN
Qty: 15 TABLET | Refills: 0 | Status: SHIPPED | OUTPATIENT
Start: 2024-09-11

## 2024-09-11 RX ORDER — POTASSIUM CHLORIDE 1500 MG/1
20 TABLET, EXTENDED RELEASE ORAL DAILY
Qty: 30 TABLET | Refills: 11 | Status: SHIPPED | OUTPATIENT
Start: 2024-09-11

## 2024-09-11 RX ORDER — FUROSEMIDE 40 MG
40 TABLET ORAL DAILY
Qty: 30 TABLET | Refills: 11 | Status: SHIPPED | OUTPATIENT
Start: 2024-09-11

## 2024-09-11 NOTE — PROGRESS NOTES
Patient declined Rx for pain medication @ discharge 9/9/2024 s/p CABG/AVR.  Seen in [facility] today and reports incisional pain impairing sleep.  Rx hydrocodone 5/325 mg Q8 hrs PRN #15 tablets.  Keep CT Surgery Clinic follow up for 10/17/2024.    Lala BONNER

## 2024-09-12 ENCOUNTER — TELEPHONE (OUTPATIENT)
Dept: CARDIOLOGY | Facility: HOSPITAL | Age: 74
End: 2024-09-12

## 2024-09-12 ENCOUNTER — READMISSION MANAGEMENT (OUTPATIENT)
Dept: CALL CENTER | Facility: HOSPITAL | Age: 74
End: 2024-09-12

## 2024-09-12 NOTE — OUTREACH NOTE
CT Surgery Week 1 Survey      Flowsheet Row Responses   [facility] facility patient discharged from? [place]   Does the patient have one of the following disease processes/diagnoses(primary or secondary)? Cardiothoracic surgery   Week 1 attempt successful? No   Unsuccessful attempts Attempt 1              Luz MAI - Registered Nurse

## 2024-09-13 ENCOUNTER — TELEPHONE (OUTPATIENT)
Dept: CARDIOLOGY | Facility: HOSPITAL | Age: 74
End: 2024-09-13

## 2024-09-13 LAB — BACTERIA SPEC AEROBE CULT: ABNORMAL

## 2024-09-13 RX ORDER — NITROFURANTOIN 25; 75 MG/1; MG/1
100 CAPSULE ORAL 2 TIMES DAILY
Qty: 14 CAPSULE | Refills: 0 | Status: SHIPPED | OUTPATIENT
Start: 2024-09-13

## 2024-09-13 NOTE — TELEPHONE ENCOUNTER
Reviewed chest x-ray and urinalysis with patient's son Jeancarlos.  Chest x-ray was clear.  Urinalysis showed UTI.  Patient to begin Macrobid 100 mg twice daily for 7 days.  .  Patient's son verbalized understanding

## 2024-09-13 NOTE — TELEPHONE ENCOUNTER
Pharmacy called to clarify furosemide and potassium dosage. Lasix 40 mg and potassium 20 meq daily.

## 2024-09-16 ENCOUNTER — TELEPHONE (OUTPATIENT)
Dept: CARDIAC SURGERY | Facility: CLINIC | Age: 74
End: 2024-09-16

## 2024-09-16 NOTE — PROGRESS NOTES
"Chief Complaint  Post-op Open Heart Surgery    Subjective    History of Present Illness {CC  Problem List  Visit  Diagnosis   Encounters  Notes  Medications  Labs  Result Review Imaging  Media :23}       History of Present Illness   74 year old male presents to the office today for ongoing evaluation of his cad. He underwent a cabgx 5, AVR (23 mm Inspiris) 9/3/24. He has a history of HTN, dyslipidemia, hfref. He reports that he has been experiencing islas, pain in left chest and pedal edema. Notes that he declined pain medication upon discharge from the hospital. He notes that pain is mostly incisional.  Does report that he is voiding very frequently and has associated Dysuria. Bp at home has been 110s.   Objective     Vital Signs:   Vitals:    09/11/24 1500 09/11/24 1501   BP: 114/65 115/67   BP Location: Left arm Left arm   Patient Position: Sitting Standing   Pulse: 99 106   SpO2: 98%    Weight: 88.5 kg (195 lb)    Height: 170.2 cm (67\")      Body mass index is 30.54 kg/m².  Physical Exam  Vitals and nursing note reviewed.   Constitutional:       Appearance: Normal appearance.   HENT:      Head: Normocephalic.   Eyes:      Pupils: Pupils are equal, round, and reactive to light.   Cardiovascular:      Rate and Rhythm: Normal rate and regular rhythm.      Pulses: Normal pulses.      Heart sounds: Normal heart sounds. No murmur heard.  Pulmonary:      Effort: Pulmonary effort is normal.      Breath sounds: Normal breath sounds.   Abdominal:      General: Bowel sounds are normal.      Palpations: Abdomen is soft.   Musculoskeletal:         General: Normal range of motion.      Cervical back: Normal range of motion.      Right lower leg: Edema (1+) present.      Left lower leg: Edema (1+) present.   Skin:     General: Skin is warm and dry.      Capillary Refill: Capillary refill takes less than 2 seconds.      Comments: Midsternal incision healing well  Mt sites scabbed    Neurological:      Mental Status: He " is alert and oriented to person, place, and time.   Psychiatric:         Mood and Affect: Mood normal.         Thought Content: Thought content normal.              Result Review  Data Reviewed:{ Labs  Result Review  Imaging  Med Tab  Media :23}   Adult Transthoracic Echo Limited W/ Cont if Necessary Per Protocol (09/06/2024 11:57)   Lab Results   Component Value Date    GLUCOSE 73 09/09/2024    CALCIUM 8.3 (L) 09/09/2024     (L) 09/09/2024    K 4.2 09/09/2024    CO2 18.0 (L) 09/09/2024     09/09/2024    BUN 13 09/09/2024    CREATININE 0.47 (L) 09/09/2024    EGFR 110.4 09/09/2024    BCR 27.7 (H) 09/09/2024    ANIONGAP 14.0 09/09/2024     Lab Results   Component Value Date    WBC 8.15 09/08/2024    HGB 10.6 (L) 09/09/2024    HCT 31.0 (L) 09/09/2024    MCV 88.7 09/08/2024     (L) 09/08/2024                  Assessment and Plan {CC Problem List  Visit Diagnosis  ROS  Review (Popup)  Health Maintenance  Quality  BestPractice  Medications  SmartSets  SnapShot Encounters  Media :23}   1. Acute on chronic HFrEF (heart failure with reduced ejection fraction)  Begin lasix 40 mg and potassium chloride 20 meq daily x 1 week   - XR Chest PA & Lateral  Stable on lisinopril, toprol  Heart failure education today including signs and symptoms, the role of the heart failure center, daily weights, low sodium diet (less than 1500 mg per day), and daily physical activity. Reviewed HF Zones with patient and family.  Patient to continue current medications as previously ordered.   2. Coronary artery disease involving native coronary artery of native heart without angina pectoris  Stable on asa, lipitor, plavix, lipitor  - XR Chest PA & Lateral    3. Frequent urination    - Urinalysis With Culture If Indicated - Urine, Clean Catch; Future  - Urinalysis With Culture If Indicated - Urine, Clean Catch  - [place] Urine Culture Tube - Urine, Clean Catch  - Urinalysis, Microscopic Only - Urine, Clean Catch  -  Urine Culture - Urine, Urine, Clean Catch    4. Benign essential HTN  Stable on lisinopril, toprol      Follow Up {Instructions Charge Capture  Follow-up Communications :23}   No follow-ups on file.    Patient was given instructions and counseling regarding his condition or for health maintenance advice. Please see specific information pulled into the AVS if appropriate.  Patient was instructed to call the [facility] with any questions, concerns, or worsening symptoms.

## 2024-09-19 ENCOUNTER — READMISSION MANAGEMENT (OUTPATIENT)
Dept: CALL CENTER | Facility: HOSPITAL | Age: 74
End: 2024-09-19

## 2024-09-24 ENCOUNTER — READMISSION MANAGEMENT (OUTPATIENT)
Dept: CALL CENTER | Facility: HOSPITAL | Age: 74
End: 2024-09-24

## 2024-10-01 ENCOUNTER — TELEPHONE (OUTPATIENT)
Dept: CARDIAC SURGERY | Facility: CLINIC | Age: 74
End: 2024-10-01

## 2024-10-01 NOTE — TELEPHONE ENCOUNTER
Left pt a v/m stating that pt has an appt in a few weeks and has  no insurance on file at the moment. And wanted to know what type of ins he has. Told him to call our office to let us know.

## 2024-10-16 NOTE — PROGRESS NOTES
[facility] Cardiothoracic Surgery Office Follow Up Note     Date of Encounter: 10/17/2024     Name: Hossein Gama  : 1950     Referred By: No ref. provider found  PCP: Charlie Turner MD    Chief Complaint:    Chief Complaint   Patient presents with    Follow-up     Hospital follow up s/p CABG X 5 on 9/3/2024       Subjective      History of Present Illness:    Hossein Gama is a 74 y.o. male s/p CABG x 5 (LIMA-LAD, SVG-PDA, SVG-OM 3, SVG-OM 2, SVG-D2) with aortic valve replacement (23 mm Inspiris tissue valve) with right greater saphenous vein endograft harvest and exploration left radial artery per Dr. Michaud on 9/3/2024.  PMH: Hypertension, type 2 diabetes, dyslipidemia, aortic stenosis s/p AVR, MV CAD s/p CABG, and ischemic cardiomyopathy (preop LVEF 30 to 35%).  Following uncomplicated surgical intervention, patient met criteria for discharge home POD #5.  His postoperative course was stable with the exception of epistaxis which began after initiation of Plavix.  Patient was instructed to hold Plavix if epistaxis continued after discharge.  Patient discharged with LifeVest due to postop LVEF 31 to 35%.  No ER visits or readmissions.  Cardiology follow-up is scheduled for 2024.  He presents to CT surgery clinic today for scheduled 6-week postop visit with CXR.  Patient states his breathing and stamina are markedly improved over the past 2 weeks.  Denies sternal clicking or popping.  States compliance with LifeVest.    Review of Systems:  Review of Systems   Constitutional: Negative. Negative for chills, decreased appetite, diaphoresis, fever, malaise/fatigue, night sweats, weight gain and weight loss.   HENT: Negative.  Negative for hoarse voice.    Eyes: Negative.  Negative for blurred vision, double vision and visual disturbance.   Cardiovascular:  Positive for chest pain (sharp left and right side lasting seconds). Negative for claudication, dyspnea on exertion, irregular  heartbeat, leg swelling, near-syncope, orthopnea, palpitations, paroxysmal nocturnal dyspnea and syncope.   Respiratory: Negative.  Negative for cough, hemoptysis, shortness of breath, sputum production and wheezing.    Hematologic/Lymphatic: Negative for adenopathy and bleeding problem. Bruises/bleeds easily.   Skin: Negative.  Negative for color change, nail changes, poor wound healing and rash.   Musculoskeletal: Negative.  Negative for back pain, falls and muscle cramps.   Gastrointestinal:  Negative for abdominal pain, dysphagia and heartburn.   Genitourinary: Negative.  Negative for flank pain.   Neurological:  Positive for loss of balance and paresthesias (RLE - sensitive area on ankle). Negative for brief paralysis, disturbances in coordination, dizziness, focal weakness, headaches, light-headedness, numbness, sensory change, vertigo and weakness.   Psychiatric/Behavioral: Negative.  Negative for depression and suicidal ideas.    Allergic/Immunologic: Negative for persistent infections.       I have reviewed the following portions of the patient's history: problem list, current medications, allergies, past surgical history, past medical history, past social history, past family history, and ROS and confirm it's accurate.    Allergies:  No Known Allergies    Medications:      Current Outpatient Medications:     aspirin 325 MG tablet, Take 1 tablet by mouth Daily., Disp: , Rfl:     atorvastatin (LIPITOR) 20 MG tablet, Take 1 tablet by mouth Daily., Disp: , Rfl:     clopidogrel (PLAVIX) 75 MG tablet, Take 1 tablet by mouth Daily., Disp: 30 tablet, Rfl: 5    empagliflozin (JARDIANCE) 10 MG tablet tablet, Take 1 tablet by mouth Daily., Disp: 30 tablet, Rfl: 1    furosemide (LASIX) 40 MG tablet, Take 1 tablet by mouth Daily., Disp: 30 tablet, Rfl: 11    lisinopril (PRINIVIL,ZESTRIL) 2.5 MG tablet, Take 1 tablet by mouth Daily., Disp: 90 tablet, Rfl: 11    metFORMIN ER (GLUCOPHAGE-XR) 500 MG 24 hr tablet, Take 1  tablet by mouth 2 (Two) Times a Day With Meals., Disp: , Rfl:     metoprolol succinate XL (TOPROL-XL) 25 MG 24 hr tablet, Take 1 tablet by mouth Daily., Disp: 30 tablet, Rfl: 1    nitroglycerin (NITROSTAT) 0.4 MG SL tablet, Place 1 tablet under the tongue Every 5 (Five) Minutes As Needed for Chest Pain. Take no more than 2 doses in 15 minutes., Disp: 100 tablet, Rfl: 0    potassium chloride (KLOR-CON M20) 20 MEQ CR tablet, Take 1 tablet by mouth Daily., Disp: 30 tablet, Rfl: 11    HYDROcodone-acetaminophen (NORCO) 5-325 MG per tablet, Take 1 tablet by mouth Every 8 (Eight) Hours As Needed for Moderate Pain. (Patient not taking: Reported on 10/17/2024), Disp: 15 tablet, Rfl: 0    nitrofurantoin, macrocrystal-monohydrate, (Macrobid) 100 MG capsule, Take 1 capsule by mouth 2 (Two) Times a Day. (Patient not taking: Reported on 10/17/2024), Disp: 14 capsule, Rfl: 0    History:   Past Medical History:   Diagnosis Date    Bladder infection     h/o    CAD (coronary artery disease) 07/29/2024    Chest pain     CHF (congestive heart failure)     Deep vein thrombosis 1998    LEFT LEG    Diabetes     GERD (gastroesophageal reflux disease)     Glaucoma     mild    Headache     Heart failure 07/29/2024    Hyperlipidemia     Hypertension     Joint pain     Light headed     Myocardial infarction     98    Pneumonia     Psoriasis     bilat knee at times-  topical ointment prn    Tinnitus        Past Surgical History:   Procedure Laterality Date    CHOLECYSTECTOMY      CORONARY ARTERY BYPASS GRAFT WITH AORTIC VALVE REPAIR/REPLACEMENT N/A 9/3/2024    Procedure: MEDIAN STERNOTOMY, CORONARY ARTERY BYPASS GRAFTING X5 UTILIZING THE LEFT INTERNAL MAMMERY ARTERY, EVH OF THE GREATER RIGHT SAPHENOUS VEIN, AORTIC VALVE REPLACEMENT, EXPLORATION OF THE LEFT RADIAL ARTERY AND YOLA PER ANESTHESIA;  Surgeon: Frankie Michaud MD;  Location: [facility];  Service: Cardiothoracic;  Laterality: N/A;    CORONARY STENT PLACEMENT      x1    CYST REMOVAL Left   "   left shoulder       Social History     Socioeconomic History    Marital status:     Number of children: 13   Tobacco Use    Smoking status: Former     Types: Pipe     Quit date:      Years since quittin.8     Passive exposure: Never    Smokeless tobacco: Never   Vaping Use    Vaping status: Never Used   Substance and Sexual Activity    Alcohol use: Never    Drug use: Never    Sexual activity: Defer        Family History   Problem Relation Age of Onset    No Known Problems Mother     Cancer Father        Objective   Physical Exam:  Vitals:    10/17/24 1301   BP: 108/60   BP Location: Right arm   Patient Position: Sitting   Pulse: 81   Temp: 96.9 °F (36.1 °C)   SpO2: 99%   Weight: 88.3 kg (194 lb 9.6 oz)   Height: 172.7 cm (68\")      Body mass index is 29.59 kg/m².    Physical Exam  Vitals reviewed.   Constitutional:       General: He is not in acute distress.     Appearance: He is not toxic-appearing.   HENT:      Head: Normocephalic and atraumatic.   Eyes:      General: Lids are normal.      Conjunctiva/sclera: Conjunctivae normal.      Pupils: Pupils are equal, round, and reactive to light.   Neck:      Vascular: No JVD.   Cardiovascular:      Rate and Rhythm: Normal rate and regular rhythm.      Pulses:           Dorsalis pedis pulses are 1+ on the right side and 1+ on the left side.        Posterior tibial pulses are 1+ on the right side and 1+ on the left side.      Heart sounds: S1 normal and S2 normal. No murmur heard.     Comments: Right EVH site healing without dehiscence or tenderness.  Left RAH site free from tenderness, induration, or dehiscence.  Left hand/wrist ROM and sensation WNL.  Pulmonary:      Effort: Pulmonary effort is normal. No respiratory distress.      Breath sounds: Normal breath sounds.   Chest:      Comments: Sternotomy incision and MT sites healing well without s/sx infection as pictured above.  MT sutures removed without complication.  No sternal " instability  Musculoskeletal:         General: Normal range of motion.      Cervical back: Normal range of motion and neck supple.      Right lower leg: No edema.      Left lower leg: No edema.   Skin:     General: Skin is warm and dry.      Capillary Refill: Capillary refill takes less than 2 seconds.   Neurological:      General: No focal deficit present.      Mental Status: He is alert and oriented to person, place, and time.   Psychiatric:         Attention and Perception: Attention normal.         Mood and Affect: Mood normal.         Speech: Speech normal.         Behavior: Behavior normal. Behavior is cooperative.                   Imaging/Labs:  CXR 10/17/2024: Personally reviewed  No appreciable pleural effusion.  Sternotomy wires intact.  No PTX. Radiology review pending.        TTE 9/6/2024 Echocardiogram Findings  Left Ventricle Left ventricular systolic function is moderately decreased. Left ventricular ejection fraction appears to be 31 - 35%.     Normal left ventricular cavity size noted. Left ventricular wall thickness is consistent with mild septal asymmetric hypertrophy. All left ventricular wall segments contract normally.   Right Ventricle Right ventricle not well visualized.   Left Atrium The left atrial cavity is mildly dilated.   Right Atrium Right atrium not well visualized.   Aortic Valve The aortic valve is structurally normal with no regurgitation or stenosis present.   Mitral Valve No mitral valve regurgitation or significant stenosis is present. Mild mitral annular calcification is present.   Tricuspid Valve The tricuspid valve is structurally normal with no significant regurgitation or significant stenosis present.   Pulmonic Valve The pulmonic valve is structurally normal with no regurgitation or significant stenosis present.   Pericardium The pericardium is normal. There is no evidence of pericardial effusion. .       Duplex Carotid Ultrasound 8/23/2024  Interpretation Summary     Right internal carotid artery demonstrates a less than 50% stenosis.    Antegrade right vertebral flow.    Left internal carotid artery demonstrates a less than 50% stenosis.    Antegrade left vertebral flow.      Assessment / Plan      Assessment / Plan:  1. Coronary artery disease s/p CABG 9/3/2024  2. Nonrheumatic aortic valve stenosis s/p AVR  - 74 y.o. male s/p CABG x 5 (LIMA-LAD, SVG-PDA, SVG-OM 3, SVG-OM 2, SVG-D2) with aortic valve replacement (23 mm Inspiris tissue valve) with right greater saphenous vein endograft harvest and exploration left radial artery per Dr. Michaud on 9/3/2024.    - PMH: Hypertension, type 2 diabetes, dyslipidemia, aortic stenosis s/p AVR, MV CAD s/p CABG, and ischemic cardiomyopathy (preop LVEF 30 to 35%).    - Uncomplicated surgical intervention, DC home POD #5.  His postoperative course was stable with the exception of epistaxis which began after initiation of Plavix.    - No further epistaxis  - Discharged with LifeVest due to postop LVEF 31 to 35%.    - States compliance w/ LifeVest  - No ER visits or readmissions.    - Compliant w/ ASA, statin, Plavix, BB  - Follow up with Cardiology as scheduled 11/7/2024.    - Postop CXR: no pleural effusion or PTX  - Steady functional recovery  - Incisions healing well without s/sx infection  - Reviewed sternal precautions and SBE prophylaxis as detailed below  - Follow up with CT Surgery PRN as requested by Cardiology.        Patient Education:Activity Restrictions: You may resume driving at 6 weeks post-operatively. No lifting more than 10 lbs for 12 weeks (3 months). At this time you can slowly increase your weight as tolerated. You should not partake in any overhead activities or movements that involve twisting of your upper chest and torso such as (but not limited to) golfing or tennis, lawn mowing including zero turn mowers, use of lawn trimmers or edgers, shoveling, painting, vacuuming, mopping, sweeping, etc.  Discussed the use of  prophylactic antibiotics before dental work and other surgeries.       Follow Up:   Return PRN as requested by Cardiology.   Or sooner for any further concerns or worsening sign and symptoms. If unable to reach us in the office please dial 911 or go to the nearest emergency department.      KAILEY Winchester  [facility] Cardiothoracic Surgery    Time Spent: I spent 40 minutes caring for Hossein on this date of service. This time includes time spent by me in the following activities: preparing for the visit, reviewing tests, obtaining and/or reviewing a separately obtained history, performing a medically appropriate examination and/or evaluation, counseling and educating the patient/family/caregiver, documenting information in the medical record, independently interpreting results and communicating that information with the patient/family/caregiver, and care coordination.

## 2024-10-17 ENCOUNTER — OFFICE VISIT (OUTPATIENT)
Dept: CARDIAC SURGERY | Facility: CLINIC | Age: 74
End: 2024-10-17

## 2024-10-17 VITALS
DIASTOLIC BLOOD PRESSURE: 60 MMHG | SYSTOLIC BLOOD PRESSURE: 108 MMHG | BODY MASS INDEX: 29.49 KG/M2 | HEIGHT: 68 IN | HEART RATE: 81 BPM | OXYGEN SATURATION: 99 % | TEMPERATURE: 96.9 F | WEIGHT: 194.6 LBS

## 2024-10-17 DIAGNOSIS — I35.0 NONRHEUMATIC AORTIC VALVE STENOSIS: ICD-10-CM

## 2024-10-17 DIAGNOSIS — I25.10 CORONARY ARTERY DISEASE INVOLVING NATIVE CORONARY ARTERY OF NATIVE HEART WITHOUT ANGINA PECTORIS: Primary | ICD-10-CM

## 2024-10-17 PROCEDURE — 99024 POSTOP FOLLOW-UP VISIT: CPT | Performed by: NURSE PRACTITIONER

## 2024-11-07 ENCOUNTER — OFFICE VISIT (OUTPATIENT)
Dept: CARDIOLOGY | Facility: CLINIC | Age: 74
End: 2024-11-07

## 2024-11-07 VITALS
DIASTOLIC BLOOD PRESSURE: 50 MMHG | BODY MASS INDEX: 30.04 KG/M2 | OXYGEN SATURATION: 98 % | HEART RATE: 73 BPM | HEIGHT: 68 IN | WEIGHT: 198.2 LBS | SYSTOLIC BLOOD PRESSURE: 128 MMHG

## 2024-11-07 DIAGNOSIS — I50.9 CONGESTIVE HEART FAILURE WITH LEFT VENTRICULAR DYSFUNCTION: ICD-10-CM

## 2024-11-07 DIAGNOSIS — I25.10 CORONARY ARTERY DISEASE INVOLVING NATIVE CORONARY ARTERY OF NATIVE HEART WITHOUT ANGINA PECTORIS: Primary | ICD-10-CM

## 2024-11-07 DIAGNOSIS — I10 BENIGN ESSENTIAL HTN: ICD-10-CM

## 2024-11-07 DIAGNOSIS — I35.0 AORTIC VALVE STENOSIS, ETIOLOGY OF CARDIAC VALVE DISEASE UNSPECIFIED: ICD-10-CM

## 2024-11-07 RX ORDER — ASPIRIN 81 MG/1
81 TABLET ORAL DAILY
Qty: 90 TABLET | Refills: 11 | Status: SHIPPED | OUTPATIENT
Start: 2024-11-07

## 2024-11-07 RX ORDER — ATORVASTATIN CALCIUM 20 MG/1
20 TABLET, FILM COATED ORAL DAILY
Qty: 90 TABLET | Refills: 3 | Status: SHIPPED | OUTPATIENT
Start: 2024-11-07

## 2024-11-07 NOTE — PROGRESS NOTES
Follow-up Visit      Date: 2024  Patient Name: Hossein Gama  : 1950   MRN: [id number]     Chief Complaint:    Chief Complaint   Patient presents with    Coronary atherosclerosis due to severely calcified coronary       History of Present Illness: Hossein Gama is a 74 y.o. male who is here today for follow-up on his coronary artery bypass graft and valve replacement.    Patient underwent successful revascularization in September and has been feeling much better.  He denies any chest pain any shortness of breath any dizziness any palpitations.  He is able to walk a lot more than before.  All his symptoms have resolved.    He is having some lower extremity edema with some tenderness in the legs.  He has those problem long time ago.  He denies any paroxysmal nocturnal dyspnea.  He denies any weight loss or any weight gain.      Problem List     CARDIAC  Coronary Artery Disease:  Remote stents in    Stress echo 2020: apical,septal and mid anteroseptal hypokinesis   [facility] [place] 2024: Severe diffusely disease  CABG x 5, 9/3/2024: LIMA-LAD, VG-PDA, OM 2, OM 3, D2    Myocardium:   Echo 2020[facility]: EF 55-60%  EF 2024:30%-35%     Valvular:   Moderate AS  S/p 23mm Inspiris tissue valve (9/3/2024)    Electrical:   NSR     Pericardium:   Normal     CARDIAC RISK FACTORS  Hypertension  Diabetes  Dyslipidemia  2024 TC 58  HDL 27 LDL 11    NON-CARDIAC  None    SURGERIES  CABG x 5   AVR      Subjective      Review of Systems:   Review of Systems   Respiratory: Negative.     Cardiovascular: Negative.        Medications:     Current Outpatient Medications:     atorvastatin (LIPITOR) 20 MG tablet, Take 1 tablet by mouth Daily., Disp: 90 tablet, Rfl: 3    clopidogrel (PLAVIX) 75 MG tablet, Take 1 tablet by mouth Daily., Disp: 30 tablet, Rfl: 5    empagliflozin (JARDIANCE) 10 MG tablet tablet, Take 1 tablet by mouth Daily., Disp: 30 tablet, Rfl: 1    furosemide (LASIX) 40 MG tablet,  "Take 1 tablet by mouth Daily., Disp: 30 tablet, Rfl: 11    HYDROcodone-acetaminophen (NORCO) 5-325 MG per tablet, Take 1 tablet by mouth Every 8 (Eight) Hours As Needed for Moderate Pain., Disp: 15 tablet, Rfl: 0    lisinopril (PRINIVIL,ZESTRIL) 2.5 MG tablet, Take 1 tablet by mouth Daily., Disp: 90 tablet, Rfl: 11    metFORMIN ER (GLUCOPHAGE-XR) 500 MG 24 hr tablet, Take 1 tablet by mouth 2 (Two) Times a Day With Meals., Disp: , Rfl:     metoprolol succinate XL (TOPROL-XL) 25 MG 24 hr tablet, Take 1 tablet by mouth Daily., Disp: 30 tablet, Rfl: 1    nitrofurantoin, macrocrystal-monohydrate, (Macrobid) 100 MG capsule, Take 1 capsule by mouth 2 (Two) Times a Day., Disp: 14 capsule, Rfl: 0    nitroglycerin (NITROSTAT) 0.4 MG SL tablet, Place 1 tablet under the tongue Every 5 (Five) Minutes As Needed for Chest Pain. Take no more than 2 doses in 15 minutes., Disp: 100 tablet, Rfl: 0    potassium chloride (KLOR-CON M20) 20 MEQ CR tablet, Take 1 tablet by mouth Daily., Disp: 30 tablet, Rfl: 11    aspirin 81 MG EC tablet, Take 1 tablet by mouth Daily., Disp: 90 tablet, Rfl: 11    Allergies:   No Known Allergies    Objective     Physical Exam:  Vitals:    11/07/24 0929   BP: 128/50   BP Location: Right arm   Patient Position: Sitting   Cuff Size: Adult   Pulse: 73   SpO2: 98%   Weight: 89.9 kg (198 lb 3.2 oz)   Height: 172.7 cm (67.99\")     Body mass index is 30.14 kg/m².    Constitutional:       General: Not in acute distress.     Appearance: Healthy appearance. Not in distress.     Neck:     JVP:Not elevated     Carotid artery: Normal    Pulmonary:      Effort: Pulmonary effort is normal.      Breath sounds: Normal breath sounds. No wheezing. No rhonchi. No rales.     Cardiovascular:      Normal rate. Regular rhythm. Normal S1. Normal S2.      Murmurs: There is mild systolic murmur.      No gallop. No click. No rub.     Abdominal:      General: Bowel sounds are normal.      Palpations: Abdomen is soft.      Tenderness: " There is no abdominal tenderness.    Extremities:     Pulses:Normal radial and pedal pulses     Edema:no edema    Smoking Cessation:   Tobacco Product History : Patient quit smoking longtime ago     Lab Review:   Lab Results   Component Value Date    GLUCOSE 73 09/09/2024    BUN 13 09/09/2024    CREATININE 0.47 (L) 09/09/2024    BCR 27.7 (H) 09/09/2024    K 4.2 09/09/2024    CO2 18.0 (L) 09/09/2024    CALCIUM 8.3 (L) 09/09/2024    ALBUMIN 3.9 09/04/2024    AST 59 (H) 09/04/2024    ALT 11 09/04/2024     Lab Results   Component Value Date    WBC 8.15 09/08/2024    HGB 10.6 (L) 09/09/2024    HCT 31.0 (L) 09/09/2024    MCV 88.7 09/08/2024     (L) 09/08/2024           Assessment / Plan      Assessment:   Diagnosis Plan   1. Coronary artery disease involving native coronary artery of native heart without angina pectoris        2. Aortic valve stenosis, etiology of cardiac valve disease unspecified        3. Benign essential HTN        4. Congestive heart failure with left ventricular dysfunction  Adult Transthoracic Echo Complete W/ Cont if Necessary Per Protocol           Plan:  Patient has been stable on his coronary artery disease symptoms.  I have advised him to cut down his aspirin to 81 mg daily.  His valve sounds good.  We will go ahead and check his echocardiogram to see if he need ICD versus take the LifeVest off.  His blood pressure has been under good control and we will continue on current medications.  I have sent his prescription for Lipitor.  We will see him back in about 3 months and decide on further taking his medications if needed.      Follow Up:       Return in about 3 months (around 2/7/2025).    Amee Jacobs MD

## 2024-11-21 ENCOUNTER — HOSPITAL ENCOUNTER (OUTPATIENT)
Dept: CARDIOLOGY | Facility: HOSPITAL | Age: 74
Discharge: HOME | End: 2024-11-21
Admitting: INTERNAL MEDICINE

## 2024-11-21 DIAGNOSIS — I50.9 CONGESTIVE HEART FAILURE WITH LEFT VENTRICULAR DYSFUNCTION: ICD-10-CM

## 2024-11-21 LAB
BH CV ECHO MEAS - AO MAX PG: 11.5 MMHG
BH CV ECHO MEAS - AO ROOT DIAM: 3.8 CM
BH CV ECHO MEAS - AO V2 MAX: 169.4 CM/SEC
BH CV ECHO MEAS - EDV(CUBED): 101.1 ML
BH CV ECHO MEAS - ESV(CUBED): 36.2 ML
BH CV ECHO MEAS - FS: 29 %
BH CV ECHO MEAS - IVS/LVPW: 0.99 CM
BH CV ECHO MEAS - IVSD: 1.38 CM
BH CV ECHO MEAS - LA DIMENSION: 3.6 CM
BH CV ECHO MEAS - LAT PEAK E' VEL: 7.8 CM/SEC
BH CV ECHO MEAS - LV MASS(C)D: 257.6 GRAMS
BH CV ECHO MEAS - LV MAX PG: 2.07 MMHG
BH CV ECHO MEAS - LV MEAN PG: 1.1 MMHG
BH CV ECHO MEAS - LV V1 MAX: 72 CM/SEC
BH CV ECHO MEAS - LV V1 VTI: 15.3 CM
BH CV ECHO MEAS - LVIDD: 4.7 CM
BH CV ECHO MEAS - LVIDS: 3.3 CM
BH CV ECHO MEAS - LVOT DIAM: 2.2 CM
BH CV ECHO MEAS - LVPWD: 1.39 CM
BH CV ECHO MEAS - MED PEAK E' VEL: 5.7 CM/SEC
BH CV ECHO MEAS - MV A MAX VEL: 92.2 CM/SEC
BH CV ECHO MEAS - MV E MAX VEL: 53.1 CM/SEC
BH CV ECHO MEAS - MV E/A: 0.58
BH CV ECHO MEAS - MV MAX PG: 5.5 MMHG
BH CV ECHO MEAS - MV P1/2T: 89 MSEC
BH CV ECHO MEAS - PA ACC TIME: 0.1 SEC
BH CV ECHO MEAS - TAPSE (>1.6): 0.8 CM
BH CV ECHO MEASUREMENTS AVERAGE E/E' RATIO: 7.87
BH CV XLRA - RV BASE: 4.8 CM
BH CV XLRA - RV LENGTH: 6.6 CM
BH CV XLRA - RV MID: 3.1 CM
BH CV XLRA - TDI S': 5.1 CM/SEC

## 2024-11-21 PROCEDURE — 25010000002 SULFUR HEXAFLUORIDE MICROSPH 60.7-25 MG RECONSTITUTED SUSPENSION: Performed by: INTERNAL MEDICINE

## 2024-11-21 PROCEDURE — 93306 TTE W/DOPPLER COMPLETE: CPT

## 2024-11-21 RX ADMIN — SULFUR HEXAFLUORIDE 3 ML: KIT at 10:54

## 2024-11-22 ENCOUNTER — TELEPHONE (OUTPATIENT)
Dept: CARDIOLOGY | Facility: CLINIC | Age: 74
End: 2024-11-22

## 2024-11-25 NOTE — TELEPHONE ENCOUNTER
Spoke with patient regarding echo results, pt is able to take off his life vest and send it back per ,      Patient would like to know if he still has any restrictions regarding his ADLs and work etc.    Thanks,

## 2025-02-17 RX ORDER — FUROSEMIDE 40 MG/1
40 TABLET ORAL DAILY
Qty: 30 TABLET | Refills: 11 | Status: SHIPPED | OUTPATIENT
Start: 2025-02-17

## 2025-02-19 NOTE — PROGRESS NOTES
Follow-up Visit      Date: 2025  Patient Name: Hossein Gama  : 1950   MRN: [id number]     Chief Complaint:    Chief Complaint   Patient presents with    Coronary Artery Disease     Coronary artery disease involving native coronary artery of native heart without angina pectoris       History of Present Illness: Hossein Gama is a 74 y.o. male who is here today for for follow-up on his coronary artery disease and valvular heart disease.    Patient has been doing fine.  Patient denies any chest pain any shortness of breath any dizziness any palpitations or any other symptoms.  Patient denies any lower extremity edema.  Patient is able to do all his activities without any problem.  Patient denies any headaches.  Patient denies any claudication.  Patient denies any other significant problem.    Patient denies any weight loss or any weight gain.  Patient denies any snoring.  Patient denies any daytime somnolence.      Problem List     CARDIAC  Coronary Artery Disease:  Remote stents in    Stress echo 2020: apical,septal and mid anteroseptal hypokinesis   [facility] 2024: Severe diffusely disease  CABG x 5, 9/3/2024: LIMA-LAD, VG-PDA, OM 2, OM 3, D2    Myocardium:   Echo 2020[place]: EF 55-60%  EF 2024:30%-35%     Valvular:   Moderate AS  S/p 23mm Inspiris tissue valve (9/3/2024)    Electrical:   NSR     Pericardium:   Normal     CARDIAC RISK FACTORS  Hypertension  Diabetes  Dyslipidemia  2024 TC 58  HDL 27 LDL 11    NON-CARDIAC  None    SURGERIES  CABG x 5   AVR      Subjective      Review of Systems:   Review of Systems   Respiratory:  Positive for shortness of breath.        Medications:     Current Outpatient Medications:     aspirin 81 MG EC tablet, Take 1 tablet by mouth Daily., Disp: 90 tablet, Rfl: 11    atorvastatin (LIPITOR) 20 MG tablet, Take 1 tablet by mouth Daily., Disp: 90 tablet, Rfl: 3    clopidogrel (PLAVIX) 75 MG tablet, Take 1 tablet by mouth  "Daily., Disp: 30 tablet, Rfl: 5    empagliflozin (JARDIANCE) 10 MG tablet tablet, Take 1 tablet by mouth Daily., Disp: 30 tablet, Rfl: 1    furosemide (LASIX) 40 MG tablet, TAKE ONE (1) TABLET BY MOUTH DAILY., Disp: 30 tablet, Rfl: 11    HYDROcodone-acetaminophen (NORCO) 5-325 MG per tablet, Take 1 tablet by mouth Every 8 (Eight) Hours As Needed for Moderate Pain., Disp: 15 tablet, Rfl: 0    lisinopril (PRINIVIL,ZESTRIL) 2.5 MG tablet, Take 1 tablet by mouth Daily., Disp: 90 tablet, Rfl: 11    metFORMIN ER (GLUCOPHAGE-XR) 500 MG 24 hr tablet, Take 1 tablet by mouth 2 (Two) Times a Day With Meals., Disp: , Rfl:     metoprolol succinate XL (TOPROL-XL) 25 MG 24 hr tablet, Take 1 tablet by mouth Daily., Disp: 30 tablet, Rfl: 1    nitrofurantoin, macrocrystal-monohydrate, (Macrobid) 100 MG capsule, Take 1 capsule by mouth 2 (Two) Times a Day., Disp: 14 capsule, Rfl: 0    nitroglycerin (NITROSTAT) 0.4 MG SL tablet, Place 1 tablet under the tongue Every 5 (Five) Minutes As Needed for Chest Pain. Take no more than 2 doses in 15 minutes., Disp: 100 tablet, Rfl: 0    potassium chloride (KLOR-CON M20) 20 MEQ CR tablet, Take 1 tablet by mouth Daily., Disp: 30 tablet, Rfl: 11    potassium chloride ER (K-TAB) 20 MEQ tablet controlled-release ER tablet, Take 1 tablet by mouth Daily., Disp: , Rfl:     Allergies:   No Known Allergies    Objective     Physical Exam:  Vitals:    02/20/25 0848   BP: 118/62   BP Location: Right arm   Patient Position: Sitting   Pulse: 71   SpO2: 100%   Weight: 90.3 kg (199 lb)   Height: 172.7 cm (68\")     Body mass index is 30.26 kg/m².    Constitutional:       General: Not in acute distress.     Appearance: Healthy appearance. Not in distress.     Neck:     JVP:Not elevated     Carotid artery: Normal    Pulmonary:      Effort: Pulmonary effort is normal.      Breath sounds: Normal breath sounds. No wheezing. No rhonchi. No rales.     Cardiovascular:      Normal rate. Regular rhythm. Normal S1. Normal " S2.      Murmurs: There is mild systolic murmur.      No gallop. No click. No rub.     Abdominal:      General: Bowel sounds are normal.      Palpations: Abdomen is soft.      Tenderness: There is no abdominal tenderness.    Extremities:     Pulses:Normal radial and pedal pulses     Edema:no edema    Smoking Cessation:   Tobacco Product History : Patient quit smoking pipe in 1984     Lab Review:   Lab Results   Component Value Date    GLUCOSE 73 09/09/2024    BUN 13 09/09/2024    CREATININE 0.47 (L) 09/09/2024    BCR 27.7 (H) 09/09/2024    K 4.2 09/09/2024    CO2 18.0 (L) 09/09/2024    CALCIUM 8.3 (L) 09/09/2024    ALBUMIN 3.9 09/04/2024    AST 59 (H) 09/04/2024    ALT 11 09/04/2024     Lab Results   Component Value Date    WBC 8.15 09/08/2024    HGB 10.6 (L) 09/09/2024    HCT 31.0 (L) 09/09/2024    MCV 88.7 09/08/2024     (L) 09/08/2024             Assessment / Plan      Assessment:   Diagnosis Plan   1. Coronary artery disease involving native coronary artery of native heart without angina pectoris  Adult Transthoracic Echo Complete W/ Cont if Necessary Per Protocol    CBC & Differential    Non-HDL Cholesterol Panel    Hepatic Function Panel    Lipid Panel    High Sensitivity CRP    Microalbumin / Creatinine Urine Ratio - Urine, Clean Catch    Comprehensive Metabolic Panel      2. Aortic valve stenosis, etiology of cardiac valve disease unspecified  Adult Transthoracic Echo Complete W/ Cont if Necessary Per Protocol      3. Essential hypertension        4. Mixed hyperlipidemia             Plan:  Patient has been doing fine for his coronary artery disease.  Patient will continue with aspirin and Plavix for a year.  Patient blood work has not been checked lately we will go ahead and check his blood work to adjust his medications.  We will check his echocardiogram evaluation of his ejection fraction and to decide for his further care.  He has a prosthetic valve and whenever he will need his dental work he will  need antibiotics.      Follow Up:       Return in about 1 year (around 2/20/2026).    Amee Jacobs MD

## 2025-02-20 ENCOUNTER — LAB (OUTPATIENT)
Dept: LAB | Facility: HOSPITAL | Age: 75
End: 2025-02-20

## 2025-02-20 ENCOUNTER — OFFICE VISIT (OUTPATIENT)
Dept: CARDIOLOGY | Facility: CLINIC | Age: 75
End: 2025-02-20

## 2025-02-20 VITALS
BODY MASS INDEX: 30.16 KG/M2 | WEIGHT: 199 LBS | OXYGEN SATURATION: 100 % | HEART RATE: 71 BPM | HEIGHT: 68 IN | SYSTOLIC BLOOD PRESSURE: 118 MMHG | DIASTOLIC BLOOD PRESSURE: 62 MMHG

## 2025-02-20 DIAGNOSIS — I10 ESSENTIAL HYPERTENSION: ICD-10-CM

## 2025-02-20 DIAGNOSIS — I25.10 CORONARY ARTERY DISEASE INVOLVING NATIVE CORONARY ARTERY OF NATIVE HEART WITHOUT ANGINA PECTORIS: ICD-10-CM

## 2025-02-20 DIAGNOSIS — I35.0 AORTIC VALVE STENOSIS, ETIOLOGY OF CARDIAC VALVE DISEASE UNSPECIFIED: ICD-10-CM

## 2025-02-20 DIAGNOSIS — I25.10 CORONARY ARTERY DISEASE INVOLVING NATIVE CORONARY ARTERY OF NATIVE HEART WITHOUT ANGINA PECTORIS: Primary | ICD-10-CM

## 2025-02-20 DIAGNOSIS — E78.2 MIXED HYPERLIPIDEMIA: ICD-10-CM

## 2025-02-20 LAB
ALBUMIN SERPL-MCNC: 4.5 G/DL (ref 3.5–5.2)
ALBUMIN UR-MCNC: <1.2 MG/DL
ALBUMIN/GLOB SERPL: 1.4 G/DL
ALP SERPL-CCNC: 128 U/L (ref 39–117)
ALT SERPL W P-5'-P-CCNC: 13 U/L (ref 1–41)
ANION GAP SERPL CALCULATED.3IONS-SCNC: 9.2 MMOL/L (ref 5–15)
AST SERPL-CCNC: 24 U/L (ref 1–40)
BASOPHILS # BLD AUTO: 0.01 10*3/MM3 (ref 0–0.2)
BASOPHILS NFR BLD AUTO: 0.1 % (ref 0–1.5)
BILIRUB CONJ SERPL-MCNC: 0.3 MG/DL (ref 0–0.3)
BILIRUB SERPL-MCNC: 0.8 MG/DL (ref 0–1.2)
BUN SERPL-MCNC: 13 MG/DL (ref 8–23)
BUN/CREAT SERPL: 13.4 (ref 7–25)
CALCIUM SPEC-SCNC: 9.9 MG/DL (ref 8.6–10.5)
CHLORIDE SERPL-SCNC: 102 MMOL/L (ref 98–107)
CHOLEST SERPL-MCNC: 143 MG/DL (ref 0–200)
CO2 SERPL-SCNC: 26.8 MMOL/L (ref 22–29)
CREAT SERPL-MCNC: 0.97 MG/DL (ref 0.76–1.27)
CREAT UR-MCNC: 27.8 MG/DL
CRP SERPL-MCNC: 0.17 MG/DL (ref 0.01–0.5)
DEPRECATED RDW RBC AUTO: 43.2 FL (ref 37–54)
EGFRCR SERPLBLD CKD-EPI 2021: 82.9 ML/MIN/1.73
EOSINOPHIL # BLD AUTO: 0.11 10*3/MM3 (ref 0–0.4)
EOSINOPHIL NFR BLD AUTO: 1.3 % (ref 0.3–6.2)
ERYTHROCYTE [DISTWIDTH] IN BLOOD BY AUTOMATED COUNT: 13.7 % (ref 12.3–15.4)
GLOBULIN UR ELPH-MCNC: 3.3 GM/DL
GLUCOSE SERPL-MCNC: 156 MG/DL (ref 65–99)
HCT VFR BLD AUTO: 50.1 % (ref 37.5–51)
HDLC SERPL-MCNC: 51 MG/DL (ref 40–60)
HGB BLD-MCNC: 16.7 G/DL (ref 13–17.7)
IMM GRANULOCYTES # BLD AUTO: 0.03 10*3/MM3 (ref 0–0.05)
IMM GRANULOCYTES NFR BLD AUTO: 0.4 % (ref 0–0.5)
LDLC SERPL CALC-MCNC: 67 MG/DL (ref 0–100)
LDLC/HDLC SERPL: 1.24 {RATIO}
LYMPHOCYTES # BLD AUTO: 1.81 10*3/MM3 (ref 0.7–3.1)
LYMPHOCYTES NFR BLD AUTO: 21.2 % (ref 19.6–45.3)
MCH RBC QN AUTO: 29 PG (ref 26.6–33)
MCHC RBC AUTO-ENTMCNC: 33.3 G/DL (ref 31.5–35.7)
MCV RBC AUTO: 87 FL (ref 79–97)
MICROALBUMIN/CREAT UR: NORMAL MG/G{CREAT}
MONOCYTES # BLD AUTO: 0.77 10*3/MM3 (ref 0.1–0.9)
MONOCYTES NFR BLD AUTO: 9 % (ref 5–12)
NEUTROPHILS NFR BLD AUTO: 5.81 10*3/MM3 (ref 1.7–7)
NEUTROPHILS NFR BLD AUTO: 68 % (ref 42.7–76)
NRBC BLD AUTO-RTO: 0 /100 WBC (ref 0–0.2)
PLATELET # BLD AUTO: 216 10*3/MM3 (ref 140–450)
PMV BLD AUTO: 10 FL (ref 6–12)
POTASSIUM SERPL-SCNC: 4.7 MMOL/L (ref 3.5–5.2)
PROT SERPL-MCNC: 7.8 G/DL (ref 6–8.5)
RBC # BLD AUTO: 5.76 10*6/MM3 (ref 4.14–5.8)
SODIUM SERPL-SCNC: 138 MMOL/L (ref 136–145)
TRIGL SERPL-MCNC: 145 MG/DL (ref 0–150)
VLDLC SERPL-MCNC: 25 MG/DL (ref 5–40)
WBC NRBC COR # BLD AUTO: 8.54 10*3/MM3 (ref 3.4–10.8)

## 2025-02-20 PROCEDURE — 82570 ASSAY OF URINE CREATININE: CPT

## 2025-02-20 PROCEDURE — 82248 BILIRUBIN DIRECT: CPT

## 2025-02-20 PROCEDURE — 36415 COLL VENOUS BLD VENIPUNCTURE: CPT

## 2025-02-20 PROCEDURE — 86141 C-REACTIVE PROTEIN HS: CPT

## 2025-02-20 PROCEDURE — 85025 COMPLETE CBC W/AUTO DIFF WBC: CPT

## 2025-02-20 PROCEDURE — 80061 LIPID PANEL: CPT

## 2025-02-20 PROCEDURE — 99214 OFFICE O/P EST MOD 30 MIN: CPT | Performed by: INTERNAL MEDICINE

## 2025-02-20 PROCEDURE — 82043 UR ALBUMIN QUANTITATIVE: CPT

## 2025-02-20 PROCEDURE — 80053 COMPREHEN METABOLIC PANEL: CPT

## 2025-02-20 RX ORDER — POTASSIUM CHLORIDE 1500 MG/1
1 TABLET, EXTENDED RELEASE ORAL DAILY
COMMUNITY
Start: 2025-01-03

## 2025-02-21 ENCOUNTER — TELEPHONE (OUTPATIENT)
Dept: CARDIOLOGY | Facility: CLINIC | Age: 75
End: 2025-02-21

## 2025-02-21 LAB
CHOLEST SERPL-MCNC: 138 MG/DL (ref 100–199)
HDLC SERPL-MCNC: 47 MG/DL
LABORATORY COMMENT REPORT: NORMAL
NONHDLC SERPL-MCNC: 91 MG/DL (ref 0–129)

## (undated) DEVICE — INTENDED FOR TISSUE SEPARATION, AND OTHER PROCEDURES THAT REQUIRE A SHARP SURGICAL BLADE TO PUNCTURE OR CUT.: Brand: BARD-PARKER ® STAINLESS STEEL BLADES

## (undated) DEVICE — ANTIBACTERIAL UNDYED BRAIDED (POLYGLACTIN 910), SYNTHETIC ABSORBABLE SUTURE: Brand: COATED VICRYL

## (undated) DEVICE — OASIS DRAIN, SINGLE, INLINE & ATS COMPATIBLE: Brand: OASIS

## (undated) DEVICE — BLD SCLPL BEAVR MINI STR 2BVL 180D LF

## (undated) DEVICE — SUT SILK 2 SUTUPAK TIE 60IN SA8H 2STRAND

## (undated) DEVICE — SUT ETHIB 1 CT1 30IN  X425H

## (undated) DEVICE — PK PERFUS CUST W/CARDIOPLEGIA

## (undated) DEVICE — CLTH CLENS READYCLEANSE PERI CARE PK/5

## (undated) DEVICE — TB SXN SURG DLP MALL/CARD SFT/TP 6F 6IN

## (undated) DEVICE — FOGARTY SPRING CLIPS 6MM: Brand: FOGARTY SOFTJAW

## (undated) DEVICE — COVER,MAYO STAND,STERILE: Brand: MEDLINE

## (undated) DEVICE — BANDAGE,GAUZE,BULKEE II,4.5"X4.1YD,STRL: Brand: MEDLINE

## (undated) DEVICE — SYS VASOVIEW2 HEMOPRO ENDOSCOPIC HARVST VESL

## (undated) DEVICE — BANDAGE,ELASTIC,ESMARK,STERILE,4"X9',LF: Brand: MEDLINE

## (undated) DEVICE — BNDG,ELSTC,MATRIX,STRL,4"X5YD,LF,HOOK&LP: Brand: MEDLINE

## (undated) DEVICE — SUT PROLN SURGILENE SURGIPRO 8 0 24IN BL

## (undated) DEVICE — BNDR ABD PREMIUM/UNIV 10IN 27TO48IN

## (undated) DEVICE — APPL CHLORAPREP TINTED 26ML TEAL

## (undated) DEVICE — ADHS SKIN PREMIERPRO EXOFIN TOPICAL HI/VISC .5ML

## (undated) DEVICE — CANN AORT ROOT DLP VNT 14G 7F

## (undated) DEVICE — PK ATS CUST W CARDIOTOMY RESEVOIR

## (undated) DEVICE — SENSR CERBRL O2 PK/2

## (undated) DEVICE — TUBING, SUCTION, 1/4" X 10', STRAIGHT: Brand: MEDLINE

## (undated) DEVICE — ADAPT ANTEGRADE RETRGR

## (undated) DEVICE — SHEET,DRAPE,53X77,STERILE: Brand: MEDLINE

## (undated) DEVICE — EZ GLIDE AORTIC CANNULA: Brand: EDWARDS LIFESCIENCES EZ GLIDE AORTIC CANNULA

## (undated) DEVICE — Device

## (undated) DEVICE — DRAPE,REIN 53X77,STERILE: Brand: MEDLINE

## (undated) DEVICE — FLTR RESERV PERFUS INTERSEPT 02 STRL

## (undated) DEVICE — SUCTION CANISTER 2500CC: Brand: DEROYAL

## (undated) DEVICE — PAD,ARMBOARD,CONV,FOAM,2X8X20",12PR/CS: Brand: MEDLINE

## (undated) DEVICE — DISPOSABLE TOURNIQUET CUFF SINGLE BLADDER, DUAL PORT AND QUICK CONNECT CONNECTOR: Brand: COLOR CUFF

## (undated) DEVICE — SUT PROLN 4/0 V7 36IN 8975H

## (undated) DEVICE — SUT SILK 0/0 CT2 18IN C027D

## (undated) DEVICE — CVR PROB ULTRASND/TRANSD W/GEL LNG 18X250CM STRL

## (undated) DEVICE — [HIGH FLOW INSUFFLATOR,  DO NOT USE IF PACKAGE IS DAMAGED,  KEEP DRY,  KEEP AWAY FROM SUNLIGHT,  PROTECT FROM HEAT AND RADIOACTIVE SOURCES.]: Brand: PNEUMOSURE

## (undated) DEVICE — 3M™ MEDIPORE™ H SOFT CLOTH SURGICAL TAPE, 2863, 3 IN X 10 YD, 12/CASE: Brand: 3M™ MEDIPORE™

## (undated) DEVICE — TB SXN DLP RIGD MACROSUCKER 6F 3IN

## (undated) DEVICE — LEVEL SENSORS PADS ARE USED TO ATTACH THE LEVEL SENSORS TO A HARD SHELL RESERVOIR. INCLUDES COUPLING GEL.: Brand: TERUMO® ADVANCED PERFUSION SYSTEM 1

## (undated) DEVICE — SUT PROLN 3/0 V7 D/A 36IN 8976H

## (undated) DEVICE — MARKER,SKIN,W/RULER,DUAL,STOP: Brand: MEDLINE

## (undated) DEVICE — TOWEL,OR,DSP,ST,NATURAL,DLX,4/PK,20PK/CS: Brand: MEDLINE

## (undated) DEVICE — SYR LUERLOK 30CC

## (undated) DEVICE — SHROD PENCL MEGADYNE ATTACHAVAC W/CONN/22MM

## (undated) DEVICE — CATHETER,FOLEY,100%SILICONE,18FR,10ML,LF: Brand: MEDLINE

## (undated) DEVICE — DRSNG SURESITE WNDW 4X4.5

## (undated) DEVICE — PK HEART OPN 10

## (undated) DEVICE — SUT PROLN 6/0 C1 D/A 30IN 8706H

## (undated) DEVICE — SUT PROLN CARDIO V5 3/0 36IN 8936H

## (undated) DEVICE — GLV SURG BIOGELULTRATOUCH POLYISPRN PF LF SZ7 STRL

## (undated) DEVICE — 12 FOOT DISPOSABLE EXTENSION CABLE WITH SAFE CONNECT / SCREW-DOWN

## (undated) DEVICE — MYOCARDIAL PROBE NON-PYROGENIC: Brand: DEROYAL

## (undated) DEVICE — CANN VESL DLP 1WY BLNT/TP 3MM

## (undated) DEVICE — ELECTRD BLD EZ CLN MOD XLNG 2.75IN

## (undated) DEVICE — PENCL ROCKRSWCH MEGADYNE W/HOLSTR 10FT SS

## (undated) DEVICE — DRSNG SURESITE WNDW 2.38X2.75

## (undated) DEVICE — SUT SILK 4/0 TIES 18IN A183H

## (undated) DEVICE — SUT MONOCRYL PLS ANTIB UND 3/0  PS1 27IN

## (undated) DEVICE — AVID DUAL STAGE VENOUS DRAINAGE CANNULA: Brand: AVID DUAL STAGE VENOUS DRAINAGE CANNULA

## (undated) DEVICE — TRAP FLD MINIVAC MEGADYNE 100ML

## (undated) DEVICE — DEFOGGER!" ANTI FOG KIT: Brand: DEROYAL

## (undated) DEVICE — SUT PROLN 7/0 BV1 D/A 24IN 8702H

## (undated) DEVICE — PATIENT RETURN ELECTRODE, SINGLE-USE, CONTACT QUALITY MONITORING, ADULT, WITH 9FT CORD, FOR PATIENTS WEIGING OVER 33LBS. (15KG): Brand: MEGADYNE

## (undated) DEVICE — SUT SILK 2/0 CT1 CR8 18IN C022D